# Patient Record
Sex: MALE | Race: ASIAN | NOT HISPANIC OR LATINO | URBAN - METROPOLITAN AREA
[De-identification: names, ages, dates, MRNs, and addresses within clinical notes are randomized per-mention and may not be internally consistent; named-entity substitution may affect disease eponyms.]

---

## 2018-03-19 ENCOUNTER — OUTPATIENT (OUTPATIENT)
Dept: OUTPATIENT SERVICES | Facility: HOSPITAL | Age: 78
LOS: 1 days | End: 2018-03-19
Payer: MEDICARE

## 2018-03-19 ENCOUNTER — APPOINTMENT (OUTPATIENT)
Dept: NUCLEAR MEDICINE | Facility: IMAGING CENTER | Age: 78
End: 2018-03-19
Payer: MEDICARE

## 2018-03-19 DIAGNOSIS — Z00.8 ENCOUNTER FOR OTHER GENERAL EXAMINATION: ICD-10-CM

## 2018-03-19 PROCEDURE — 78999 UNLISTED MISC PX DX NUC MED: CPT

## 2018-03-19 PROCEDURE — 78320: CPT | Mod: 26

## 2018-03-19 PROCEDURE — 78306 BONE IMAGING WHOLE BODY: CPT | Mod: 26

## 2018-03-19 PROCEDURE — 78306 BONE IMAGING WHOLE BODY: CPT

## 2018-03-19 PROCEDURE — A9561: CPT

## 2018-06-15 ENCOUNTER — RECORD ABSTRACTING (OUTPATIENT)
Age: 78
End: 2018-06-15

## 2018-06-18 RX ORDER — BLOOD SUGAR DIAGNOSTIC
STRIP MISCELLANEOUS DAILY
Qty: 1 | Refills: 0 | Status: ACTIVE | COMMUNITY
Start: 2018-06-15 | End: 1900-01-01

## 2018-08-06 PROBLEM — Z63.4 WIDOWED: Status: ACTIVE | Noted: 2018-08-02

## 2018-08-06 PROBLEM — K59.00 CONSTIPATION: Status: ACTIVE | Noted: 2018-08-02

## 2018-08-06 PROBLEM — Z80.3 FAMILY HISTORY OF MALIGNANT NEOPLASM OF BREAST: Status: ACTIVE | Noted: 2018-08-02

## 2018-08-06 PROBLEM — C79.51 BONE METASTASES: Status: ACTIVE | Noted: 2018-08-06

## 2018-08-06 PROBLEM — Z80.0 FAMILY HISTORY OF MALIGNANT NEOPLASM OF COLON: Status: ACTIVE | Noted: 2018-08-02

## 2018-08-06 RX ORDER — ADHESIVE TAPE 3"X 2.3 YD
50 MCG TAPE, NON-MEDICATED TOPICAL
Refills: 0 | Status: ACTIVE | COMMUNITY

## 2018-08-06 RX ORDER — LEUPROLIDE ACETATE 7.5 MG
7.5 KIT INTRAMUSCULAR
Refills: 0 | Status: ACTIVE | COMMUNITY

## 2018-08-07 ENCOUNTER — TRANSCRIPTION ENCOUNTER (OUTPATIENT)
Age: 78
End: 2018-08-07

## 2018-08-07 ENCOUNTER — APPOINTMENT (OUTPATIENT)
Dept: INTERNAL MEDICINE | Facility: CLINIC | Age: 78
End: 2018-08-07
Payer: MEDICARE

## 2018-08-07 VITALS
DIASTOLIC BLOOD PRESSURE: 80 MMHG | HEIGHT: 68 IN | TEMPERATURE: 97.1 F | SYSTOLIC BLOOD PRESSURE: 130 MMHG | BODY MASS INDEX: 22.13 KG/M2 | HEART RATE: 62 BPM | RESPIRATION RATE: 15 BRPM | WEIGHT: 146 LBS

## 2018-08-07 DIAGNOSIS — C79.51 SECONDARY MALIGNANT NEOPLASM OF BONE: ICD-10-CM

## 2018-08-07 DIAGNOSIS — E11.9 TYPE 2 DIABETES MELLITUS W/OUT COMPLICATIONS: ICD-10-CM

## 2018-08-07 DIAGNOSIS — Z80.0 FAMILY HISTORY OF MALIGNANT NEOPLASM OF DIGESTIVE ORGANS: ICD-10-CM

## 2018-08-07 DIAGNOSIS — K59.00 CONSTIPATION, UNSPECIFIED: ICD-10-CM

## 2018-08-07 DIAGNOSIS — Z63.4 DISAPPEARANCE AND DEATH OF FAMILY MEMBER: ICD-10-CM

## 2018-08-07 DIAGNOSIS — Z13.89 ENCOUNTER FOR SCREENING FOR OTHER DISORDER: ICD-10-CM

## 2018-08-07 DIAGNOSIS — Z80.3 FAMILY HISTORY OF MALIGNANT NEOPLASM OF BREAST: ICD-10-CM

## 2018-08-07 PROCEDURE — 99214 OFFICE O/P EST MOD 30 MIN: CPT | Mod: 25

## 2018-08-07 PROCEDURE — G0444 DEPRESSION SCREEN ANNUAL: CPT | Mod: 59

## 2018-08-07 PROCEDURE — 36415 COLL VENOUS BLD VENIPUNCTURE: CPT

## 2018-08-07 RX ORDER — LANCETS
EACH MISCELLANEOUS
Qty: 180 | Refills: 2 | Status: ACTIVE | COMMUNITY
Start: 2018-08-07 | End: 1900-01-01

## 2018-08-07 RX ORDER — LANCETS 28 GAUGE
EACH MISCELLANEOUS
Qty: 1 | Refills: 0 | Status: DISCONTINUED | COMMUNITY
Start: 2018-06-15 | End: 2018-08-07

## 2018-08-07 RX ORDER — ENZALUTAMIDE 40 MG/1
40 CAPSULE ORAL
Refills: 0 | Status: DISCONTINUED | COMMUNITY
End: 2018-08-07

## 2018-08-07 RX ORDER — BLOOD SUGAR DIAGNOSTIC
STRIP MISCELLANEOUS TWICE DAILY
Qty: 180 | Refills: 2 | Status: ACTIVE | COMMUNITY
Start: 2018-08-07 | End: 1900-01-01

## 2018-08-07 SDOH — SOCIAL STABILITY - SOCIAL INSECURITY: DISSAPEARANCE AND DEATH OF FAMILY MEMBER: Z63.4

## 2018-08-07 NOTE — HISTORY OF PRESENT ILLNESS
[FreeTextEntry1] : f/u DM, lipids [de-identified] : he sees his oncologist monthly. His PSA was rising. his medication was changed from Xtandi to Zytiga. he was also started on prednisone. He has been eating more. May have gained weight. No polyuria or polydipsia.

## 2018-08-07 NOTE — PHYSICAL EXAM
[No Acute Distress] : no acute distress [Well Nourished] : well nourished [Well Developed] : well developed [Normal Sclera/Conjunctiva] : normal sclera/conjunctiva [Normal Outer Ear/Nose] : the outer ears and nose were normal in appearance [No Respiratory Distress] : no respiratory distress  [No Accessory Muscle Use] : no accessory muscle use [Normal Rate] : normal rate  [Regular Rhythm] : with a regular rhythm [Normal S1, S2] : normal S1 and S2 [No Edema] : there was no peripheral edema [Normal Gait] : normal gait [No Focal Deficits] : no focal deficits [Normal Affect] : the affect was normal [Normal Insight/Judgement] : insight and judgment were intact

## 2018-08-07 NOTE — PLAN
[FreeTextEntry1] : Depression screening negative \par Check labs today\par Followup with the oncologist monthly\par Followup here 4 months

## 2018-08-08 ENCOUNTER — TRANSCRIPTION ENCOUNTER (OUTPATIENT)
Age: 78
End: 2018-08-08

## 2018-08-10 ENCOUNTER — TRANSCRIPTION ENCOUNTER (OUTPATIENT)
Age: 78
End: 2018-08-10

## 2018-08-16 ENCOUNTER — TRANSCRIPTION ENCOUNTER (OUTPATIENT)
Age: 78
End: 2018-08-16

## 2018-08-16 LAB
ALBUMIN SERPL ELPH-MCNC: 4.7 G/DL
ALP BLD-CCNC: 40 U/L
ALT SERPL-CCNC: 19 U/L
ANION GAP SERPL CALC-SCNC: 18 MMOL/L
AST SERPL-CCNC: 23 U/L
BILIRUB SERPL-MCNC: 0.7 MG/DL
BUN SERPL-MCNC: 19 MG/DL
CALCIUM SERPL-MCNC: 9.5 MG/DL
CHLORIDE SERPL-SCNC: 101 MMOL/L
CHOLEST SERPL-MCNC: 238 MG/DL
CHOLEST/HDLC SERPL: 4.3 RATIO
CO2 SERPL-SCNC: 21 MMOL/L
CREAT SERPL-MCNC: 0.83 MG/DL
GLUCOSE SERPL-MCNC: 117 MG/DL
HBA1C MFR BLD HPLC: 7.3 %
HDLC SERPL-MCNC: 55 MG/DL
LDLC SERPL CALC-MCNC: 129 MG/DL
POTASSIUM SERPL-SCNC: 4.4 MMOL/L
PROT SERPL-MCNC: 7.1 G/DL
SODIUM SERPL-SCNC: 140 MMOL/L
TRIGL SERPL-MCNC: 269 MG/DL

## 2019-04-04 ENCOUNTER — OUTPATIENT (OUTPATIENT)
Dept: OUTPATIENT SERVICES | Facility: HOSPITAL | Age: 79
LOS: 1 days | End: 2019-04-04
Payer: MEDICARE

## 2019-04-04 ENCOUNTER — APPOINTMENT (OUTPATIENT)
Dept: NUCLEAR MEDICINE | Facility: IMAGING CENTER | Age: 79
End: 2019-04-04
Payer: MEDICARE

## 2019-04-04 ENCOUNTER — TRANSCRIPTION ENCOUNTER (OUTPATIENT)
Age: 79
End: 2019-04-04

## 2019-04-04 DIAGNOSIS — Z00.8 ENCOUNTER FOR OTHER GENERAL EXAMINATION: ICD-10-CM

## 2019-04-04 PROCEDURE — 78320: CPT | Mod: 26

## 2019-04-04 PROCEDURE — 78306 BONE IMAGING WHOLE BODY: CPT | Mod: 26

## 2019-04-04 PROCEDURE — A9561: CPT

## 2019-04-04 PROCEDURE — 78999 UNLISTED MISC PX DX NUC MED: CPT

## 2019-04-04 PROCEDURE — 78306 BONE IMAGING WHOLE BODY: CPT

## 2019-06-12 ENCOUNTER — NON-APPOINTMENT (OUTPATIENT)
Age: 79
End: 2019-06-12

## 2019-06-12 ENCOUNTER — APPOINTMENT (OUTPATIENT)
Dept: INTERNAL MEDICINE | Facility: CLINIC | Age: 79
End: 2019-06-12
Payer: MEDICARE

## 2019-06-12 VITALS
HEIGHT: 68 IN | TEMPERATURE: 94.5 F | DIASTOLIC BLOOD PRESSURE: 80 MMHG | SYSTOLIC BLOOD PRESSURE: 132 MMHG | WEIGHT: 146 LBS | BODY MASS INDEX: 22.13 KG/M2

## 2019-06-12 DIAGNOSIS — E55.9 VITAMIN D DEFICIENCY, UNSPECIFIED: ICD-10-CM

## 2019-06-12 PROCEDURE — G0439: CPT

## 2019-06-12 PROCEDURE — 93000 ELECTROCARDIOGRAM COMPLETE: CPT

## 2019-06-12 PROCEDURE — 36415 COLL VENOUS BLD VENIPUNCTURE: CPT

## 2019-06-12 NOTE — HISTORY OF PRESENT ILLNESS
[Other: _____] : [unfilled] [FreeTextEntry1] : annual physical [de-identified] : He sees his oncologist every 3 months. He is still on prednisone and Zytiga\par He has a good appetite\par he notices he bruises easily\par

## 2019-06-12 NOTE — HEALTH RISK ASSESSMENT
[] : No [0] : 2) Feeling down, depressed, or hopeless: Not at all (0) [DEP3Bgwrm] : 0 [Change in mental status noted] : No change in mental status noted [None] : None [With Family] : lives with family [Retired] : retired [] :  [Reports changes in hearing] : Reports no changes in hearing [Reports changes in vision] : Reports no changes in vision [Reports changes in dental health] : Reports no changes in dental health

## 2019-06-12 NOTE — PLAN
[FreeTextEntry1] : Depression screening negative\par Check labs today\par Likely HbA1c will be higher due to the steroids If >8 will discuss medication\par f/u with oncology\par f/u to be determined by above

## 2019-06-12 NOTE — PHYSICAL EXAM
[No Acute Distress] : no acute distress [Well Developed] : well developed [Well Nourished] : well nourished [Well-Appearing] : well-appearing [Normal Sclera/Conjunctiva] : normal sclera/conjunctiva [PERRL] : pupils equal round and reactive to light [EOMI] : extraocular movements intact [Normal Outer Ear/Nose] : the outer ears and nose were normal in appearance [Normal Oropharynx] : the oropharynx was normal [No JVD] : no jugular venous distention [Normal TMs] : both tympanic membranes were normal [Supple] : supple [No Lymphadenopathy] : no lymphadenopathy [Thyroid Normal, No Nodules] : the thyroid was normal and there were no nodules present [No Respiratory Distress] : no respiratory distress  [Clear to Auscultation] : lungs were clear to auscultation bilaterally [No Accessory Muscle Use] : no accessory muscle use [Normal Rate] : normal rate  [Regular Rhythm] : with a regular rhythm [Normal S1, S2] : normal S1 and S2 [No Murmur] : no murmur heard [No Carotid Bruits] : no carotid bruits [No Varicosities] : no varicosities [Pedal Pulses Present] : the pedal pulses are present [No Edema] : there was no peripheral edema [No Extremity Clubbing/Cyanosis] : no extremity clubbing/cyanosis [Soft] : abdomen soft [Non Tender] : non-tender [Non-distended] : non-distended [No Masses] : no abdominal mass palpated [No HSM] : no HSM [Normal Bowel Sounds] : normal bowel sounds [Normal Axillary Nodes] : no axillary lymphadenopathy [Normal Supraclavicular Nodes] : no supraclavicular lymphadenopathy [Normal Posterior Cervical Nodes] : no posterior cervical lymphadenopathy [Normal Anterior Cervical Nodes] : no anterior cervical lymphadenopathy [No CVA Tenderness] : no CVA  tenderness [No Spinal Tenderness] : no spinal tenderness [Grossly Normal Strength/Tone] : grossly normal strength/tone [No Joint Swelling] : no joint swelling [Normal Gait] : normal gait [No Rash] : no rash [Coordination Grossly Intact] : coordination grossly intact [No Focal Deficits] : no focal deficits [Deep Tendon Reflexes (DTR)] : deep tendon reflexes were 2+ and symmetric [Normal Affect] : the affect was normal [Normal Insight/Judgement] : insight and judgment were intact

## 2019-06-13 ENCOUNTER — TRANSCRIPTION ENCOUNTER (OUTPATIENT)
Age: 79
End: 2019-06-13

## 2019-06-13 LAB
25(OH)D3 SERPL-MCNC: 26.5 NG/ML
ALBUMIN SERPL ELPH-MCNC: 4.2 G/DL
ALP BLD-CCNC: 92 U/L
ALT SERPL-CCNC: 38 U/L
ANION GAP SERPL CALC-SCNC: 13 MMOL/L
APPEARANCE: CLEAR
AST SERPL-CCNC: 32 U/L
BACTERIA: NEGATIVE
BASOPHILS # BLD AUTO: 0.03 K/UL
BASOPHILS NFR BLD AUTO: 0.6 %
BILIRUB SERPL-MCNC: 0.8 MG/DL
BILIRUBIN URINE: NEGATIVE
BLOOD URINE: NEGATIVE
BUN SERPL-MCNC: 13 MG/DL
CALCIUM SERPL-MCNC: 9.3 MG/DL
CHLORIDE SERPL-SCNC: 99 MMOL/L
CHOLEST SERPL-MCNC: 240 MG/DL
CHOLEST/HDLC SERPL: 5.6 RATIO
CO2 SERPL-SCNC: 26 MMOL/L
COLOR: YELLOW
CREAT SERPL-MCNC: 0.93 MG/DL
EOSINOPHIL # BLD AUTO: 0.02 K/UL
EOSINOPHIL NFR BLD AUTO: 0.4 %
ESTIMATED AVERAGE GLUCOSE: 174 MG/DL
GLUCOSE QUALITATIVE U: NEGATIVE
GLUCOSE SERPL-MCNC: 127 MG/DL
HBA1C MFR BLD HPLC: 7.7 %
HCT VFR BLD CALC: 41.8 %
HDLC SERPL-MCNC: 43 MG/DL
HGB BLD-MCNC: 13.4 G/DL
HYALINE CASTS: 0 /LPF
IMM GRANULOCYTES NFR BLD AUTO: 3 %
KETONES URINE: NORMAL
LDLC SERPL CALC-MCNC: 117 MG/DL
LEUKOCYTE ESTERASE URINE: NEGATIVE
LYMPHOCYTES # BLD AUTO: 1.62 K/UL
LYMPHOCYTES NFR BLD AUTO: 34.6 %
MAN DIFF?: NORMAL
MCHC RBC-ENTMCNC: 32.1 GM/DL
MCHC RBC-ENTMCNC: 33.2 PG
MCV RBC AUTO: 103.5 FL
MICROSCOPIC-UA: NORMAL
MONOCYTES # BLD AUTO: 0.52 K/UL
MONOCYTES NFR BLD AUTO: 11.1 %
NEUTROPHILS # BLD AUTO: 2.35 K/UL
NEUTROPHILS NFR BLD AUTO: 50.3 %
NITRITE URINE: NEGATIVE
PH URINE: 7
PLATELET # BLD AUTO: 212 K/UL
POTASSIUM SERPL-SCNC: 4.4 MMOL/L
PROT SERPL-MCNC: 6.7 G/DL
PROTEIN URINE: NORMAL
RBC # BLD: 4.04 M/UL
RBC # FLD: 13.7 %
RED BLOOD CELLS URINE: 5 /HPF
SODIUM SERPL-SCNC: 138 MMOL/L
SPECIFIC GRAVITY URINE: 1.01
SQUAMOUS EPITHELIAL CELLS: 0 /HPF
TRIGL SERPL-MCNC: 398 MG/DL
TSH SERPL-ACNC: 2.32 UIU/ML
UROBILINOGEN URINE: NORMAL
WBC # FLD AUTO: 4.68 K/UL
WHITE BLOOD CELLS URINE: 0 /HPF

## 2019-10-01 ENCOUNTER — APPOINTMENT (OUTPATIENT)
Dept: INTERNAL MEDICINE | Facility: CLINIC | Age: 79
End: 2019-10-01
Payer: MEDICARE

## 2019-10-01 VITALS
DIASTOLIC BLOOD PRESSURE: 70 MMHG | TEMPERATURE: 97.3 F | HEIGHT: 68 IN | WEIGHT: 137 LBS | SYSTOLIC BLOOD PRESSURE: 108 MMHG | BODY MASS INDEX: 20.76 KG/M2

## 2019-10-01 PROCEDURE — 99214 OFFICE O/P EST MOD 30 MIN: CPT | Mod: 25

## 2019-10-01 PROCEDURE — 36415 COLL VENOUS BLD VENIPUNCTURE: CPT

## 2019-10-01 NOTE — HISTORY OF PRESENT ILLNESS
[Other: _____] : [unfilled] [FreeTextEntry1] : f/u DM, lipids [de-identified] : 79-year-old male with a history of metastatic prostate cancer, diabetes who is here for followup. Last visit his hemoglobin A1c ana rosa to 7.7. He is on daily steroids. He was started on metformin. He denies abdominal pain, nausea, vomiting.\par According to his daughter his PSA is rising.

## 2019-10-01 NOTE — PLAN
[FreeTextEntry1] : Will recheck his hemoglobin A1c today. Adjust metformin as dictated\par Recheck lipids.\par He declines all vaccinations\par f/u with oncology/urology\par Followup 3 months

## 2019-10-01 NOTE — PHYSICAL EXAM
[No Acute Distress] : no acute distress [Well Nourished] : well nourished [Normal Sclera/Conjunctiva] : normal sclera/conjunctiva [Normal Outer Ear/Nose] : the outer ears and nose were normal in appearance [No Lymphadenopathy] : no lymphadenopathy [Supple] : supple [No Respiratory Distress] : no respiratory distress  [No Accessory Muscle Use] : no accessory muscle use [Clear to Auscultation] : lungs were clear to auscultation bilaterally [Normal Rate] : normal rate  [Regular Rhythm] : with a regular rhythm [Normal S1, S2] : normal S1 and S2 [Coordination Grossly Intact] : coordination grossly intact [Normal Gait] : normal gait [Normal Affect] : the affect was normal [Normal Insight/Judgement] : insight and judgment were intact

## 2019-10-07 ENCOUNTER — TRANSCRIPTION ENCOUNTER (OUTPATIENT)
Age: 79
End: 2019-10-07

## 2019-10-07 LAB
ALBUMIN SERPL ELPH-MCNC: 4.6 G/DL
ALP BLD-CCNC: 104 U/L
ALT SERPL-CCNC: 10 U/L
ANION GAP SERPL CALC-SCNC: 14 MMOL/L
AST SERPL-CCNC: 19 U/L
BILIRUB SERPL-MCNC: 0.8 MG/DL
BUN SERPL-MCNC: 17 MG/DL
CALCIUM SERPL-MCNC: 9.3 MG/DL
CHLORIDE SERPL-SCNC: 102 MMOL/L
CHOLEST SERPL-MCNC: 230 MG/DL
CHOLEST/HDLC SERPL: 5.2 RATIO
CO2 SERPL-SCNC: 27 MMOL/L
CREAT SERPL-MCNC: 0.77 MG/DL
ESTIMATED AVERAGE GLUCOSE: 177 MG/DL
GLUCOSE SERPL-MCNC: 117 MG/DL
HBA1C MFR BLD HPLC: 7.8 %
HDLC SERPL-MCNC: 44 MG/DL
LDLC SERPL CALC-MCNC: NORMAL MG/DL
POTASSIUM SERPL-SCNC: 4.4 MMOL/L
PROT SERPL-MCNC: 6.6 G/DL
SODIUM SERPL-SCNC: 143 MMOL/L
TRIGL SERPL-MCNC: 424 MG/DL

## 2019-10-09 ENCOUNTER — RX RENEWAL (OUTPATIENT)
Age: 79
End: 2019-10-09

## 2019-10-09 ENCOUNTER — TRANSCRIPTION ENCOUNTER (OUTPATIENT)
Age: 79
End: 2019-10-09

## 2019-12-18 ENCOUNTER — FORM ENCOUNTER (OUTPATIENT)
Age: 79
End: 2019-12-18

## 2019-12-18 ENCOUNTER — APPOINTMENT (OUTPATIENT)
Dept: NUCLEAR MEDICINE | Facility: IMAGING CENTER | Age: 79
End: 2019-12-18
Payer: MEDICARE

## 2019-12-18 ENCOUNTER — OUTPATIENT (OUTPATIENT)
Dept: OUTPATIENT SERVICES | Facility: HOSPITAL | Age: 79
LOS: 1 days | End: 2019-12-18
Payer: MEDICARE

## 2019-12-18 DIAGNOSIS — Z00.8 ENCOUNTER FOR OTHER GENERAL EXAMINATION: ICD-10-CM

## 2019-12-18 PROCEDURE — 78999A: CUSTOM | Mod: 26

## 2019-12-18 PROCEDURE — 78306 BONE IMAGING WHOLE BODY: CPT

## 2019-12-18 PROCEDURE — 78306 BONE IMAGING WHOLE BODY: CPT | Mod: 26

## 2019-12-18 PROCEDURE — 78999 UNLISTED MISC PX DX NUC MED: CPT

## 2019-12-18 PROCEDURE — A9561: CPT

## 2019-12-18 PROCEDURE — 78803 RP LOCLZJ TUM SPECT 1 AREA: CPT

## 2019-12-18 PROCEDURE — 78320: CPT | Mod: 26

## 2019-12-19 ENCOUNTER — OUTPATIENT (OUTPATIENT)
Dept: OUTPATIENT SERVICES | Facility: HOSPITAL | Age: 79
LOS: 1 days | End: 2019-12-19
Payer: MEDICARE

## 2019-12-19 ENCOUNTER — APPOINTMENT (OUTPATIENT)
Dept: ORTHOPEDIC SURGERY | Facility: CLINIC | Age: 79
End: 2019-12-19
Payer: MEDICARE

## 2019-12-19 VITALS
DIASTOLIC BLOOD PRESSURE: 78 MMHG | BODY MASS INDEX: 20.62 KG/M2 | WEIGHT: 136.04 LBS | SYSTOLIC BLOOD PRESSURE: 132 MMHG | HEART RATE: 86 BPM | HEIGHT: 68 IN | OXYGEN SATURATION: 96 %

## 2019-12-19 DIAGNOSIS — Z00.8 ENCOUNTER FOR OTHER GENERAL EXAMINATION: ICD-10-CM

## 2019-12-19 PROCEDURE — 72170 X-RAY EXAM OF PELVIS: CPT

## 2019-12-19 PROCEDURE — 73501 X-RAY EXAM HIP UNI 1 VIEW: CPT | Mod: 26,RT,76

## 2019-12-19 PROCEDURE — 73552 X-RAY EXAM OF FEMUR 2/>: CPT | Mod: LT

## 2019-12-19 PROCEDURE — 73551 X-RAY EXAM OF FEMUR 1: CPT | Mod: 26,50

## 2019-12-19 PROCEDURE — 99204 OFFICE O/P NEW MOD 45 MIN: CPT

## 2019-12-19 NOTE — REASON FOR VISIT
[FreeTextEntry1] : Presented for evaluation of multifocal skeletal metastatic disease from prostate carcinoma

## 2019-12-19 NOTE — HISTORY OF PRESENT ILLNESS
[FreeTextEntry1] : This is the first visit of an 79 years old male with a medical history of prostate carcinoma several years ago and patient was diagnosed with prostate carcinoma at that time already patient had mostly focal skeletal metastatic disease and there was no treatment patient was admitted with a palliative medication. At this date patient having mild tenderness over the musculoskeletal system. Recent x-ray demonstrated multifocal blastic skeletal lesion for metastatic prostate

## 2019-12-19 NOTE — PHYSICAL EXAM
[FreeTextEntry1] : Physical exam reveals a healthy-looking patient in no apparent distress patient appeared to be fully alert oriented and in no significant complaints ambulating without any difficulty and in no significant pain on ambulation. And there is no palpable mass no gross neurovascular deficit patient having good range of motion about both hips.\par X-rays of the pelvis including right and left femur demonstrate a multifocal osteoblastic lesion for metastatic prostate carcinoma this likely impending fracture and this date patient was recommended to continue with the same level of activity or that the risk of possible future fracture could not be ruled out

## 2020-02-04 ENCOUNTER — APPOINTMENT (OUTPATIENT)
Dept: INTERNAL MEDICINE | Facility: CLINIC | Age: 80
End: 2020-02-04
Payer: MEDICARE

## 2020-02-04 VITALS
SYSTOLIC BLOOD PRESSURE: 120 MMHG | DIASTOLIC BLOOD PRESSURE: 80 MMHG | TEMPERATURE: 97.1 F | OXYGEN SATURATION: 97 % | HEIGHT: 68 IN | HEART RATE: 72 BPM | BODY MASS INDEX: 20.46 KG/M2 | WEIGHT: 135 LBS

## 2020-02-04 DIAGNOSIS — C61 MALIGNANT NEOPLASM OF PROSTATE: ICD-10-CM

## 2020-02-04 DIAGNOSIS — R63.4 ABNORMAL WEIGHT LOSS: ICD-10-CM

## 2020-02-04 PROCEDURE — 99214 OFFICE O/P EST MOD 30 MIN: CPT | Mod: 25

## 2020-02-04 PROCEDURE — 36415 COLL VENOUS BLD VENIPUNCTURE: CPT

## 2020-02-04 NOTE — HISTORY OF PRESENT ILLNESS
[Other: _____] : [unfilled] [FreeTextEntry1] : f/u DM and lipids [de-identified] : 80-year-old male history of metastatic prostate cancer, diabetes and hypertriglyceridemia who is here for followup. He states that he has a good appetite eats everything. He denies polyuria or polydipsia. He has felt on steroids. According to his daughter his PSA is rising. It was last around 50. He is scheduled to go back to the oncologist in March. The daughter states that they are thinking about starting chemotherapy. He denies any pain. He has a slight cough. No fevers or chills. Cough is nonproductive.

## 2020-02-04 NOTE — REVIEW OF SYSTEMS
[Recent Change In Weight] : ~T recent weight change [Cough] : cough [Negative] : Heme/Lymph [Fever] : no fever [Chills] : no chills [Shortness Of Breath] : no shortness of breath

## 2020-02-04 NOTE — PLAN
[FreeTextEntry1] : Depression screening negative\par Blood pressure is stable\par He has lost weight despite having a good appetite. His daughter plans to readdress the treatment for his prostate cancer with the oncologist. They requested a PSA be checked today.\par Check hemoglobin A1c and lipids. He is fasting. Blood drawn in office\par He is on metformin.  Will make adjustments to his medications his labs dictate.\par Declined all vaccinations

## 2020-02-04 NOTE — PHYSICAL EXAM
[No Acute Distress] : no acute distress [Well Nourished] : well nourished [Normal Sclera/Conjunctiva] : normal sclera/conjunctiva [No Respiratory Distress] : no respiratory distress  [Normal Outer Ear/Nose] : the outer ears and nose were normal in appearance [No Accessory Muscle Use] : no accessory muscle use [Clear to Auscultation] : lungs were clear to auscultation bilaterally [Normal Rate] : normal rate  [Regular Rhythm] : with a regular rhythm [Normal S1, S2] : normal S1 and S2 [Coordination Grossly Intact] : coordination grossly intact [No Focal Deficits] : no focal deficits [Normal Affect] : the affect was normal [Normal Insight/Judgement] : insight and judgment were intact

## 2020-02-06 ENCOUNTER — TRANSCRIPTION ENCOUNTER (OUTPATIENT)
Age: 80
End: 2020-02-06

## 2020-02-07 ENCOUNTER — TRANSCRIPTION ENCOUNTER (OUTPATIENT)
Age: 80
End: 2020-02-07

## 2020-02-12 ENCOUNTER — TRANSCRIPTION ENCOUNTER (OUTPATIENT)
Age: 80
End: 2020-02-12

## 2020-02-12 LAB
ALBUMIN SERPL ELPH-MCNC: 4.7 G/DL
ALP BLD-CCNC: 143 U/L
ALT SERPL-CCNC: 13 U/L
ANION GAP SERPL CALC-SCNC: 20 MMOL/L
AST SERPL-CCNC: 17 U/L
BILIRUB SERPL-MCNC: 0.7 MG/DL
BUN SERPL-MCNC: 22 MG/DL
CALCIUM SERPL-MCNC: 9.4 MG/DL
CHLORIDE SERPL-SCNC: 100 MMOL/L
CHOLEST SERPL-MCNC: 277 MG/DL
CHOLEST/HDLC SERPL: 5.4 RATIO
CO2 SERPL-SCNC: 22 MMOL/L
CREAT SERPL-MCNC: 0.81 MG/DL
GLUCOSE SERPL-MCNC: 111 MG/DL
HDLC SERPL-MCNC: 51 MG/DL
LDLC SERPL CALC-MCNC: NORMAL MG/DL
POTASSIUM SERPL-SCNC: 3.9 MMOL/L
PROT SERPL-MCNC: 6.9 G/DL
PSA SERPL-MCNC: 62.2 NG/ML
SODIUM SERPL-SCNC: 142 MMOL/L
TRIGL SERPL-MCNC: 440 MG/DL

## 2020-02-20 ENCOUNTER — INPATIENT (INPATIENT)
Facility: HOSPITAL | Age: 80
LOS: 3 days | Discharge: HOME | DRG: 309 | End: 2020-02-24
Attending: HOSPITALIST | Admitting: STUDENT IN AN ORGANIZED HEALTH CARE EDUCATION/TRAINING PROGRAM
Payer: MEDICARE

## 2020-02-20 VITALS
TEMPERATURE: 98 F | HEIGHT: 68 IN | RESPIRATION RATE: 18 BRPM | OXYGEN SATURATION: 97 % | DIASTOLIC BLOOD PRESSURE: 77 MMHG | WEIGHT: 139.99 LBS | SYSTOLIC BLOOD PRESSURE: 111 MMHG | HEART RATE: 198 BPM

## 2020-02-20 DIAGNOSIS — Z29.9 ENCOUNTER FOR PROPHYLACTIC MEASURES, UNSPECIFIED: ICD-10-CM

## 2020-02-20 DIAGNOSIS — I47.1 SUPRAVENTRICULAR TACHYCARDIA: ICD-10-CM

## 2020-02-20 DIAGNOSIS — C61 MALIGNANT NEOPLASM OF PROSTATE: ICD-10-CM

## 2020-02-20 DIAGNOSIS — E11.9 TYPE 2 DIABETES MELLITUS WITHOUT COMPLICATIONS: ICD-10-CM

## 2020-02-20 DIAGNOSIS — Z02.9 ENCOUNTER FOR ADMINISTRATIVE EXAMINATIONS, UNSPECIFIED: ICD-10-CM

## 2020-02-20 LAB
ALBUMIN SERPL ELPH-MCNC: 4 G/DL — SIGNIFICANT CHANGE UP (ref 3.3–5)
ALP SERPL-CCNC: 158 U/L — HIGH (ref 40–120)
ALT FLD-CCNC: 11 U/L — SIGNIFICANT CHANGE UP (ref 10–45)
ANION GAP SERPL CALC-SCNC: 15 MMOL/L — SIGNIFICANT CHANGE UP (ref 5–17)
AST SERPL-CCNC: 27 U/L — SIGNIFICANT CHANGE UP (ref 10–40)
BASOPHILS # BLD AUTO: 0.02 K/UL — SIGNIFICANT CHANGE UP (ref 0–0.2)
BASOPHILS NFR BLD AUTO: 0.3 % — SIGNIFICANT CHANGE UP (ref 0–2)
BILIRUB SERPL-MCNC: 0.6 MG/DL — SIGNIFICANT CHANGE UP (ref 0.2–1.2)
BUN SERPL-MCNC: 18 MG/DL — SIGNIFICANT CHANGE UP (ref 7–23)
CALCIUM SERPL-MCNC: 9.4 MG/DL — SIGNIFICANT CHANGE UP (ref 8.4–10.5)
CHLORIDE SERPL-SCNC: 95 MMOL/L — LOW (ref 96–108)
CO2 SERPL-SCNC: 23 MMOL/L — SIGNIFICANT CHANGE UP (ref 22–31)
CREAT SERPL-MCNC: 0.79 MG/DL — SIGNIFICANT CHANGE UP (ref 0.5–1.3)
EOSINOPHIL # BLD AUTO: 0 K/UL — SIGNIFICANT CHANGE UP (ref 0–0.5)
EOSINOPHIL NFR BLD AUTO: 0 % — SIGNIFICANT CHANGE UP (ref 0–6)
GLUCOSE SERPL-MCNC: 260 MG/DL — HIGH (ref 70–99)
HCT VFR BLD CALC: 40.7 % — SIGNIFICANT CHANGE UP (ref 39–50)
HGB BLD-MCNC: 12.9 G/DL — LOW (ref 13–17)
IMM GRANULOCYTES NFR BLD AUTO: 0.9 % — SIGNIFICANT CHANGE UP (ref 0–1.5)
LYMPHOCYTES # BLD AUTO: 1.17 K/UL — SIGNIFICANT CHANGE UP (ref 1–3.3)
LYMPHOCYTES # BLD AUTO: 15.7 % — SIGNIFICANT CHANGE UP (ref 13–44)
MAGNESIUM SERPL-MCNC: 2.2 MG/DL — SIGNIFICANT CHANGE UP (ref 1.6–2.6)
MCHC RBC-ENTMCNC: 31.7 GM/DL — LOW (ref 32–36)
MCHC RBC-ENTMCNC: 32.3 PG — SIGNIFICANT CHANGE UP (ref 27–34)
MCV RBC AUTO: 101.8 FL — HIGH (ref 80–100)
MONOCYTES # BLD AUTO: 0.69 K/UL — SIGNIFICANT CHANGE UP (ref 0–0.9)
MONOCYTES NFR BLD AUTO: 9.3 % — SIGNIFICANT CHANGE UP (ref 2–14)
NEUTROPHILS # BLD AUTO: 5.5 K/UL — SIGNIFICANT CHANGE UP (ref 1.8–7.4)
NEUTROPHILS NFR BLD AUTO: 73.8 % — SIGNIFICANT CHANGE UP (ref 43–77)
NRBC # BLD: 0 /100 WBCS — SIGNIFICANT CHANGE UP (ref 0–0)
PHOSPHATE SERPL-MCNC: 2.8 MG/DL — SIGNIFICANT CHANGE UP (ref 2.5–4.5)
PLATELET # BLD AUTO: 192 K/UL — SIGNIFICANT CHANGE UP (ref 150–400)
POTASSIUM SERPL-MCNC: 4.4 MMOL/L — SIGNIFICANT CHANGE UP (ref 3.5–5.3)
POTASSIUM SERPL-SCNC: 4.4 MMOL/L — SIGNIFICANT CHANGE UP (ref 3.5–5.3)
PROT SERPL-MCNC: 7.1 G/DL — SIGNIFICANT CHANGE UP (ref 6–8.3)
RBC # BLD: 4 M/UL — LOW (ref 4.2–5.8)
RBC # FLD: 13.9 % — SIGNIFICANT CHANGE UP (ref 10.3–14.5)
SODIUM SERPL-SCNC: 133 MMOL/L — LOW (ref 135–145)
TROPONIN T, HIGH SENSITIVITY RESULT: 26 NG/L — SIGNIFICANT CHANGE UP (ref 0–51)
TROPONIN T, HIGH SENSITIVITY RESULT: 29 NG/L — SIGNIFICANT CHANGE UP (ref 0–51)
TSH SERPL-MCNC: 1.36 UIU/ML — SIGNIFICANT CHANGE UP (ref 0.27–4.2)
WBC # BLD: 7.45 K/UL — SIGNIFICANT CHANGE UP (ref 3.8–10.5)
WBC # FLD AUTO: 7.45 K/UL — SIGNIFICANT CHANGE UP (ref 3.8–10.5)

## 2020-02-20 PROCEDURE — 71045 X-RAY EXAM CHEST 1 VIEW: CPT | Mod: 26

## 2020-02-20 PROCEDURE — 99223 1ST HOSP IP/OBS HIGH 75: CPT

## 2020-02-20 PROCEDURE — 99291 CRITICAL CARE FIRST HOUR: CPT

## 2020-02-20 PROCEDURE — 93010 ELECTROCARDIOGRAM REPORT: CPT

## 2020-02-20 RX ORDER — INSULIN LISPRO 100/ML
VIAL (ML) SUBCUTANEOUS
Refills: 0 | Status: DISCONTINUED | OUTPATIENT
Start: 2020-02-20 | End: 2020-02-24

## 2020-02-20 RX ORDER — ENOXAPARIN SODIUM 100 MG/ML
40 INJECTION SUBCUTANEOUS DAILY
Refills: 0 | Status: DISCONTINUED | OUTPATIENT
Start: 2020-02-20 | End: 2020-02-22

## 2020-02-20 RX ORDER — INSULIN LISPRO 100/ML
VIAL (ML) SUBCUTANEOUS AT BEDTIME
Refills: 0 | Status: DISCONTINUED | OUTPATIENT
Start: 2020-02-20 | End: 2020-02-22

## 2020-02-20 RX ORDER — INSULIN LISPRO 100/ML
VIAL (ML) SUBCUTANEOUS
Refills: 0 | Status: DISCONTINUED | OUTPATIENT
Start: 2020-02-20 | End: 2020-02-20

## 2020-02-20 RX ORDER — SODIUM CHLORIDE 9 MG/ML
1000 INJECTION, SOLUTION INTRAVENOUS ONCE
Refills: 0 | Status: COMPLETED | OUTPATIENT
Start: 2020-02-20 | End: 2020-02-20

## 2020-02-20 RX ORDER — ADENOSINE 3 MG/ML
6 INJECTION INTRAVENOUS ONCE
Refills: 0 | Status: COMPLETED | OUTPATIENT
Start: 2020-02-20 | End: 2020-02-20

## 2020-02-20 RX ADMIN — ADENOSINE 6 MILLIGRAM(S): 3 INJECTION INTRAVENOUS at 13:42

## 2020-02-20 RX ADMIN — SODIUM CHLORIDE 1000 MILLILITER(S): 9 INJECTION, SOLUTION INTRAVENOUS at 13:53

## 2020-02-20 RX ADMIN — Medication 5 MILLIGRAM(S): at 18:36

## 2020-02-20 RX ADMIN — Medication 0: at 22:25

## 2020-02-20 NOTE — ED PROVIDER NOTE - ATTENDING CONTRIBUTION TO CARE
81 y/o m with pmhx DM type 2, HTNm, HLD, metastatic prostate CA diagnosed 4 yrs ago, presents for abnml heart rate. Random  pulse ox done at home and daughter noted high heart rate. SHe listened to heart rate and noted the same so then called sister (who is a nephrologist) and told to come to the ED. no fever at home. no chills. no cough no recent travel. no leg swelling. no weight changes. denies chest pain or short of braeth. denies weakness / fatigue.    PMD Dr. Lis Ardon  Gen.  elderly appearing male. no apparent distress  HEENT:  perrl eomi pharynx clear.   Lungs:  b/l bs  CVS: S1S2   Abd;  soft non tender no distention  Ext:  trace b/l lower ext pitting edema but no erythema   Neuro: aaox3 no focal deficits  MSK: strength 5/5 b/l upper and lower ext,.

## 2020-02-20 NOTE — H&P ADULT - PROBLEM SELECTOR PLAN 1
EKG not in chart. ?AVNRT   Repeat EKG pending.  Unclear etiology at this point in time. Possible component of dehydration given hyponatremia. ED gave 500 cc of isotonic fluid.  No s/s of infectious process.  No s/s of blood loss anemia. Workup pending.   TTE pending to evaluate for structural disease.  TSH pending.   Continue tele monitoring.   Broke with adenosine 6 mg IVP.   IF recurrent, can try vagal maneuvers first however did not work prior.  EP evaluation pending. Called by ED. EKG not in chart. ?AVNRT   Repeat EKG pending.  Unclear etiology at this point in time. Possible component of dehydration given hyponatremia. ED gave 500 cc of isotonic fluid. Encouraging PO intake.   No s/s of infectious process.  No s/s of blood loss anemia. Workup pending.   TTE pending to evaluate for structural disease.  TSH pending.   Was asymptomatic without anginal equivalents on ROS.  Continue tele monitoring.   Broke with adenosine 6 mg IVP.   IF recurrent, can try vagal maneuvers first however did not work prior.  EP evaluation pending. Called by ED.

## 2020-02-20 NOTE — H&P ADULT - NSHPLABSRESULTS_GEN_ALL_CORE
LABS:                        12.9   7.45  )-----------( 192      ( 20 Feb 2020 13:40 )             40.7     02-20    133<L>  |  95<L>  |  18  ----------------------------<  260<H>  4.4   |  23  |  0.79    Ca    9.4      20 Feb 2020 13:40  Phos  2.8     02-20  Mg     2.2     02-20    TPro  7.1  /  Alb  4.0  /  TBili  0.6  /  DBili  x   /  AST  27  /  ALT  11  /  AlkPhos  158<H>  02-20      ADDITIONAL STUDIES PERSONALLY REVIEWED BY ME:  CXR: no clear acute cardiopulmonary process.  EKG not in chart. Repeat ordered.    PRIOR RECORDS OR OUTPATIENT RECORDS REVIEWED:  Yes [x ] No [ ]  -- outpatient medication

## 2020-02-20 NOTE — H&P ADULT - NSHPPHYSICALEXAM_GEN_ALL_CORE
T(C): 36.6 (02-20-20 @ 13:15), Max: 36.6 (02-20-20 @ 13:15)  T(F): 97.8 (02-20-20 @ 13:15), Max: 97.8 (02-20-20 @ 13:15)  HR: 91 (02-20-20 @ 14:52) (91 - 198)  BP: 124/74 (02-20-20 @ 14:52) (104/80 - 133/106)  ABP: --  ABP(mean): --  RR: 22 (02-20-20 @ 14:52) (18 - 22)  SpO2: 100% (02-20-20 @ 14:52) (97% - 100%)    CONSTITUTIONAL: No acute distress.   HEENT:  Conjunctiva clear B/L. Nasal mucosa normal. Mildly dry oral mucosa.    Cardiovascular: RRR with no murmurs. No JVD noted. No lower extremity edema B/L. Extremities are warm and well perfused. Radial pulses 2+ B/L. Dorsalis pedis pulses 2+ B/L.    Respiratory: Lungs CTAB. No accessory muscle use.   Gastrointestinal:  Soft, nontender. Non-distended. Non-rigid. No CVA tenderness B/L.  MSK:  No joint swelling. No joint erythema B/L. No midline spinal tenderness.  Neurologic:  Alert and awake. Oriented x3. Moving all extremities. Following commands. Making eye contact.    Skin:  No rashes noted. No skin erythema noted.   Psych:  Normal affect. Normal Mood.

## 2020-02-20 NOTE — ED ADULT NURSE NOTE - OBJECTIVE STATEMENT
80 year old male presenting to ED from home, accompanied by daughter, c/o tachycardia. Pt A+Ox3, family reports playing with pulse oximeter at home when pt discovered rapid heart rate in the 190s. Pt reports he's been asymptomatic. PMH includes diabetes and metastatic prostate cancer, currently in treatment. Pt denies headache, dizziness, change in vision, chest pain, SOB, N/V, abdominal pain, recent travel.

## 2020-02-20 NOTE — ED PROVIDER NOTE - CLINICAL SUMMARY MEDICAL DECISION MAKING FREE TEXT BOX
80M p/w stable SVT. MEntating and perfusing well, lungs clear without evidence of edema. Will get labs, electrolytes, tsh, trop, ekg. Will start with vagal maneuvers and escalate adenosine if needed. 80M p/w stable SVT. MEntating and perfusing well, lungs clear without evidence of edema. Will get labs, electrolytes, tsh, trop, ekg. Will start with vagal maneuvers and escalate adenosine if needed.  ATTG: : elevated heart rate concern for svt will give adenosine, check labs, check xray check cardiac work up, electrolytes and re eval for sispo

## 2020-02-20 NOTE — ED ADULT NURSE REASSESSMENT NOTE - NS ED NURSE REASSESS COMMENT FT1
Repeat troponin collected and sent to lab. Pt lying comfortably in stretcher, daughter at bedside, extra blankets and urinal provided. Pt and family aware of plan of care, admitted to telemetry for SVT.

## 2020-02-20 NOTE — ED PROVIDER NOTE - CARDIAC RATE
"              After Visit Summary   1/17/2017    Elise Bernard    MRN: [id number]           Patient Information     Date Of Birth          1953        Visit Information        Provider Department      1/17/2017 1:30 PM Giuseppe Mullins MD [facility]         Follow-ups after your visit        Who to contact     Please call your clinic at [phone] to:    Ask questions about your health    Make or cancel appointments    Discuss your medicines    Learn about your test results    Speak to your doctor   If you have compliments or concerns about an experience at your clinic, or if you wish to file a complaint, please contact [facility] Patient Relations at [phone] or email us at [email or web address]         Additional Information About Your Visit        MyChart Information     [employer] gives you secure access to your electronic health record. If you see a primary care provider, you can also send messages to your care team and make appointments. If you have questions, please call your primary care clinic.  If you do not have a primary care provider, please call [phone] and they will assist you.      [employer] is an electronic gateway that provides easy, online access to your medical records. With [employer], you can request a clinic appointment, read your test results, renew a prescription or communicate with your care team.     To access your existing account, please contact your [facility] Clinic or call [phone] for assistance.        Care EveryWhere ID     This is your Care EveryWhere ID. This could be used by other organizations to access your [place] medical records  [id number]        Your Vitals Were     Pulse Height BMI (Body Mass Index) Breastfeeding?          87 5' 4\" (162.6 cm) 23.09 kg/m2 No         Blood Pressure from Last 3 Encounters:   01/17/17 151/64   01/02/17 147/71   11/25/16 125/71    Weight from Last 3 Encounters: "   01/17/17 134 lb 9.6 oz (61.054 kg)   11/25/16 132 lb (59.875 kg)   08/26/16 123 lb (55.792 kg)              Today, you had the following     No orders found for display       Primary Care Provider Office Phone # Fax #    Myles Birmingham [phone]       [facility] [address]  [place] [address]        Thank you!     Thank you for choosing [facility]  for your care. Our goal is always to provide you with excellent care. Hearing back from our patients is one way we can continue to improve our services. Please take a few minutes to complete the written survey that you may receive in the mail after your visit with us. Thank you!             Your Updated Medication List - Protect others around you: Learn how to safely use, store and throw away your medicines at www.disposemymeds.org.          This list is accurate as of: 1/17/17  3:18 PM.  Always use your most recent med list.                   Brand Name Dispense Instructions for use    ASPIRIN PO      Take 81 mg by mouth daily       donepezil 5 MG tablet    ARICEPT    30 tablet    Take 1 tablet (5 mg) by mouth At Bedtime       escitalopram 10 MG tablet    LEXAPRO         estradiol 0.05 MG/24HR BIW patch    VIVELLE-DOT     Place 1 patch onto the skin twice a week       GEMFIBROZIL PO      Take 600 mg by mouth daily       LIVALO PO      Take 4 mg by mouth daily       METOPROLOL SUCCINATE ER PO      Take 50 mg by mouth daily       OVER-THE-COUNTER      allertec ( antihystamine) 10 mg daily       PROGESTERONE MICRONIZED PO      Take 200 mg by mouth 10 days a month       VITAMIN D (CHOLECALCIFEROL) PO      Take 50,000 Units by mouth once a week          TACHYCARDIC

## 2020-02-20 NOTE — H&P ADULT - HISTORY OF PRESENT ILLNESS
Dr. Carlos Luevano, DO  Attending Physician  Division of Hospital Medicine  [facility]  Pager:  [phone] Dr. Carlos Luevano DO  Attending Physician  Division of Hospital Medicine  [facility]  Pager:  [phone]    History obtained from ED, patient, and his daughter at bedside.    This is a pleasant 80 year old male with PMHx of Type II DM and  metastatic prostate CA who presents because of HR in the 180s which was incidentally found by pulse oximetry by patient's daughter. Patient asymptomatic. No chest pain, dyspnea, palpitations, dizziness, syncope. No fever, chills, nausea, cough, abd pain, headache, or rash. No blood in stool. This is the first time this has happened. No family history of heart disease. Does not drink or take drugs. He reports being a little thirsty.

## 2020-02-20 NOTE — ED PROVIDER NOTE - OBJECTIVE STATEMENT
80M hx dm, metastatic prostate CA, presents w/ heart rate in the 180s, found by daughter incidentally when checking his pulse ox at home. Pt is completely asymptomatic - denies cp, palpitations, trouble breathing, n/v/d, recent infections.

## 2020-02-20 NOTE — ED ADULT NURSE NOTE - NSFALLRSKOUTCOME_ED_ALL_ED
VACCINE ADMINISTRATION RECORD  PARENT / GUARDIAN APPROVAL  Date: 2018  Vaccine administered to: Lilliam Penn     : 10/2/2019    MRN: [id number]    A copy of the appropriate Centers for Disease Control and Prevention Vaccine Information statem Universal Safety Interventions

## 2020-02-20 NOTE — H&P ADULT - ATTENDING COMMENTS
Dr. Carlos Luevano, DO  Attending Physician  Division of Hospital Medicine  [facility]  Pager:  [phone]

## 2020-02-20 NOTE — H&P ADULT - ASSESSMENT
This is a pleasant 80 year old male with PMHx of Type II DM and  metastatic prostate CA who presents w/ SVT.

## 2020-02-20 NOTE — H&P ADULT - PROBLEM SELECTOR PLAN 5
Transitions of Care Status:  1.  Name of PCP: Dr. Ardon  2.  PCP Contacted on Admission: [ ] Y    [ x] N   -- after hours.  3.  PCP contacted at Discharge: [ ] Y    [ ] N    [ ] N/A  4.  Post-Discharge Appointment Date and Location:  5.  Summary of Handoff given to PCP:

## 2020-02-21 DIAGNOSIS — R65.10 SYSTEMIC INFLAMMATORY RESPONSE SYNDROME (SIRS) OF NON-INFECTIOUS ORIGIN WITHOUT ACUTE ORGAN DYSFUNCTION: ICD-10-CM

## 2020-02-21 LAB
ALBUMIN SERPL ELPH-MCNC: 3.5 G/DL — SIGNIFICANT CHANGE UP (ref 3.3–5)
ALP SERPL-CCNC: 131 U/L — HIGH (ref 40–120)
ALT FLD-CCNC: 10 U/L — SIGNIFICANT CHANGE UP (ref 10–45)
ANION GAP SERPL CALC-SCNC: 12 MMOL/L — SIGNIFICANT CHANGE UP (ref 5–17)
APPEARANCE UR: CLEAR — SIGNIFICANT CHANGE UP
AST SERPL-CCNC: 13 U/L — SIGNIFICANT CHANGE UP (ref 10–40)
BASOPHILS # BLD AUTO: 0.02 K/UL — SIGNIFICANT CHANGE UP (ref 0–0.2)
BASOPHILS NFR BLD AUTO: 0.4 % — SIGNIFICANT CHANGE UP (ref 0–2)
BILIRUB SERPL-MCNC: 0.8 MG/DL — SIGNIFICANT CHANGE UP (ref 0.2–1.2)
BILIRUB UR-MCNC: NEGATIVE — SIGNIFICANT CHANGE UP
BUN SERPL-MCNC: 16 MG/DL — SIGNIFICANT CHANGE UP (ref 7–23)
CALCIUM SERPL-MCNC: 8.6 MG/DL — SIGNIFICANT CHANGE UP (ref 8.4–10.5)
CHLORIDE SERPL-SCNC: 100 MMOL/L — SIGNIFICANT CHANGE UP (ref 96–108)
CO2 SERPL-SCNC: 24 MMOL/L — SIGNIFICANT CHANGE UP (ref 22–31)
COLOR SPEC: SIGNIFICANT CHANGE UP
CREAT SERPL-MCNC: 0.72 MG/DL — SIGNIFICANT CHANGE UP (ref 0.5–1.3)
DIFF PNL FLD: NEGATIVE — SIGNIFICANT CHANGE UP
EOSINOPHIL # BLD AUTO: 0.01 K/UL — SIGNIFICANT CHANGE UP (ref 0–0.5)
EOSINOPHIL NFR BLD AUTO: 0.2 % — SIGNIFICANT CHANGE UP (ref 0–6)
FOLATE SERPL-MCNC: 11.8 NG/ML — SIGNIFICANT CHANGE UP
GLUCOSE SERPL-MCNC: 130 MG/DL — HIGH (ref 70–99)
GLUCOSE UR QL: ABNORMAL
HBA1C BLD-MCNC: 7.1 % — HIGH (ref 4–5.6)
HCT VFR BLD CALC: 36.5 % — LOW (ref 39–50)
HGB BLD-MCNC: 11.7 G/DL — LOW (ref 13–17)
IMM GRANULOCYTES NFR BLD AUTO: 1.1 % — SIGNIFICANT CHANGE UP (ref 0–1.5)
KETONES UR-MCNC: SIGNIFICANT CHANGE UP
LEUKOCYTE ESTERASE UR-ACNC: NEGATIVE — SIGNIFICANT CHANGE UP
LYMPHOCYTES # BLD AUTO: 1.2 K/UL — SIGNIFICANT CHANGE UP (ref 1–3.3)
LYMPHOCYTES # BLD AUTO: 21.4 % — SIGNIFICANT CHANGE UP (ref 13–44)
MAGNESIUM SERPL-MCNC: 2.1 MG/DL — SIGNIFICANT CHANGE UP (ref 1.6–2.6)
MCHC RBC-ENTMCNC: 32.1 GM/DL — SIGNIFICANT CHANGE UP (ref 32–36)
MCHC RBC-ENTMCNC: 32.4 PG — SIGNIFICANT CHANGE UP (ref 27–34)
MCV RBC AUTO: 101.1 FL — HIGH (ref 80–100)
MONOCYTES # BLD AUTO: 0.63 K/UL — SIGNIFICANT CHANGE UP (ref 0–0.9)
MONOCYTES NFR BLD AUTO: 11.2 % — SIGNIFICANT CHANGE UP (ref 2–14)
NEUTROPHILS # BLD AUTO: 3.69 K/UL — SIGNIFICANT CHANGE UP (ref 1.8–7.4)
NEUTROPHILS NFR BLD AUTO: 65.7 % — SIGNIFICANT CHANGE UP (ref 43–77)
NITRITE UR-MCNC: NEGATIVE — SIGNIFICANT CHANGE UP
NRBC # BLD: 0 /100 WBCS — SIGNIFICANT CHANGE UP (ref 0–0)
PH UR: 7 — SIGNIFICANT CHANGE UP (ref 5–8)
PHOSPHATE SERPL-MCNC: 2.5 MG/DL — SIGNIFICANT CHANGE UP (ref 2.5–4.5)
PLATELET # BLD AUTO: 163 K/UL — SIGNIFICANT CHANGE UP (ref 150–400)
POTASSIUM SERPL-MCNC: 3.3 MMOL/L — LOW (ref 3.5–5.3)
POTASSIUM SERPL-SCNC: 3.3 MMOL/L — LOW (ref 3.5–5.3)
PROT SERPL-MCNC: 6.2 G/DL — SIGNIFICANT CHANGE UP (ref 6–8.3)
PROT UR-MCNC: NEGATIVE — SIGNIFICANT CHANGE UP
RAPID RVP RESULT: SIGNIFICANT CHANGE UP
RBC # BLD: 3.61 M/UL — LOW (ref 4.2–5.8)
RBC # BLD: 3.61 M/UL — LOW (ref 4.2–5.8)
RBC # FLD: 14 % — SIGNIFICANT CHANGE UP (ref 10.3–14.5)
RETICS #: 80.9 K/UL — SIGNIFICANT CHANGE UP (ref 25–125)
RETICS/RBC NFR: 2.2 % — SIGNIFICANT CHANGE UP (ref 0.5–2.5)
SODIUM SERPL-SCNC: 136 MMOL/L — SIGNIFICANT CHANGE UP (ref 135–145)
SP GR SPEC: 1.02 — SIGNIFICANT CHANGE UP (ref 1.01–1.02)
UROBILINOGEN FLD QL: SIGNIFICANT CHANGE UP
VIT B12 SERPL-MCNC: 216 PG/ML — LOW (ref 232–1245)
WBC # BLD: 5.61 K/UL — SIGNIFICANT CHANGE UP (ref 3.8–10.5)
WBC # FLD AUTO: 5.61 K/UL — SIGNIFICANT CHANGE UP (ref 3.8–10.5)

## 2020-02-21 PROCEDURE — 99223 1ST HOSP IP/OBS HIGH 75: CPT

## 2020-02-21 PROCEDURE — 93306 TTE W/DOPPLER COMPLETE: CPT | Mod: 26

## 2020-02-21 PROCEDURE — 99232 SBSQ HOSP IP/OBS MODERATE 35: CPT

## 2020-02-21 PROCEDURE — 71045 X-RAY EXAM CHEST 1 VIEW: CPT | Mod: 26

## 2020-02-21 RX ORDER — SODIUM CHLORIDE 9 MG/ML
1000 INJECTION INTRAMUSCULAR; INTRAVENOUS; SUBCUTANEOUS ONCE
Refills: 0 | Status: COMPLETED | OUTPATIENT
Start: 2020-02-21 | End: 2020-02-21

## 2020-02-21 RX ORDER — ADENOSINE 3 MG/ML
6 INJECTION INTRAVENOUS ONCE
Refills: 0 | Status: COMPLETED | OUTPATIENT
Start: 2020-02-21 | End: 2020-02-21

## 2020-02-21 RX ORDER — MAGNESIUM SULFATE 500 MG/ML
2 VIAL (ML) INJECTION ONCE
Refills: 0 | Status: COMPLETED | OUTPATIENT
Start: 2020-02-21 | End: 2020-02-21

## 2020-02-21 RX ORDER — POTASSIUM CHLORIDE 20 MEQ
10 PACKET (EA) ORAL
Refills: 0 | Status: COMPLETED | OUTPATIENT
Start: 2020-02-21 | End: 2020-02-21

## 2020-02-21 RX ORDER — POTASSIUM CHLORIDE 20 MEQ
40 PACKET (EA) ORAL ONCE
Refills: 0 | Status: COMPLETED | OUTPATIENT
Start: 2020-02-21 | End: 2020-02-21

## 2020-02-21 RX ORDER — PREGABALIN 225 MG/1
1000 CAPSULE ORAL DAILY
Refills: 0 | Status: DISCONTINUED | OUTPATIENT
Start: 2020-02-21 | End: 2020-02-24

## 2020-02-21 RX ORDER — METOPROLOL TARTRATE 50 MG
25 TABLET ORAL
Refills: 0 | Status: DISCONTINUED | OUTPATIENT
Start: 2020-02-21 | End: 2020-02-22

## 2020-02-21 RX ORDER — ACETAMINOPHEN 500 MG
975 TABLET ORAL ONCE
Refills: 0 | Status: COMPLETED | OUTPATIENT
Start: 2020-02-21 | End: 2020-02-21

## 2020-02-21 RX ORDER — METOPROLOL TARTRATE 50 MG
25 TABLET ORAL ONCE
Refills: 0 | Status: COMPLETED | OUTPATIENT
Start: 2020-02-21 | End: 2020-02-21

## 2020-02-21 RX ADMIN — ADENOSINE 6 MILLIGRAM(S): 3 INJECTION INTRAVENOUS at 08:17

## 2020-02-21 RX ADMIN — Medication 975 MILLIGRAM(S): at 09:20

## 2020-02-21 RX ADMIN — SODIUM CHLORIDE 1000 MILLILITER(S): 9 INJECTION INTRAMUSCULAR; INTRAVENOUS; SUBCUTANEOUS at 09:30

## 2020-02-21 RX ADMIN — Medication 100 MILLIEQUIVALENT(S): at 14:14

## 2020-02-21 RX ADMIN — Medication 25 MILLIGRAM(S): at 09:35

## 2020-02-21 RX ADMIN — Medication 40 MILLIEQUIVALENT(S): at 09:11

## 2020-02-21 RX ADMIN — Medication 5 MILLIGRAM(S): at 17:09

## 2020-02-21 RX ADMIN — Medication 4: at 21:37

## 2020-02-21 RX ADMIN — ADENOSINE 6 MILLIGRAM(S): 3 INJECTION INTRAVENOUS at 09:30

## 2020-02-21 RX ADMIN — Medication 4: at 12:26

## 2020-02-21 RX ADMIN — Medication 100 MILLIEQUIVALENT(S): at 10:00

## 2020-02-21 RX ADMIN — Medication 100 MILLIEQUIVALENT(S): at 09:02

## 2020-02-21 RX ADMIN — Medication 50 GRAM(S): at 08:56

## 2020-02-21 RX ADMIN — ENOXAPARIN SODIUM 40 MILLIGRAM(S): 100 INJECTION SUBCUTANEOUS at 12:29

## 2020-02-21 RX ADMIN — Medication 25 MILLIGRAM(S): at 17:09

## 2020-02-21 RX ADMIN — Medication 5 MILLIGRAM(S): at 10:48

## 2020-02-21 RX ADMIN — Medication 2: at 17:06

## 2020-02-21 RX ADMIN — PREGABALIN 1000 MICROGRAM(S): 225 CAPSULE ORAL at 17:12

## 2020-02-21 NOTE — CHART NOTE - NSCHARTNOTEFT_GEN_A_CORE
[id number]  SINDY GALLO [id number]  STONEYSINDY FOREMAN    Patient S/p RRT x 2 this am after Found Tachycardic HR up to 180-190's w/ SVT on telemetry Monitor.  Pt has PMH of Type II DM and metastatic prostate CA was admitted for HR in the 180's at home on a pulse oximeter. Now with recurrent SVT up to 190's. Patient was asymptomatic during both episodes. Given adenosine 6mg IV x 2 then medically converted. Remained asymptomatic thereafter. Noted to have a low grade temp which was treated  w/ Tylenol and IVF's.  Empiric blood cultures sent and pending. Started on Lopressor 25mg bid for additional rate control. EP consulted and recc to continue on BB, will await EP attending recc's. See RRT sheet for full details.  Pt AAO, Follows all commands, in no acute distress, with Family at bedside. Attending Physician was present and will follow patient closely with the team. Currently patient awaiting Echocardiogram.  Endorse to Floor NP for Follow up on labs, CX, and Echo.     ICU Vital Signs Last 24 Hrs  T(C): 36.6 (21 Feb 2020 12:05), Max: 36.8 (20 Feb 2020 18:30)  T(F): 97.8 (21 Feb 2020 12:05), Max: 98.3 (20 Feb 2020 18:30)  HR: 75 (21 Feb 2020 12:05) (72 - 183)  BP: 115/70 (21 Feb 2020 12:05) (102/85 - 144/78)  BP(mean): --  ABP: --  ABP(mean): --  RR: 18 (21 Feb 2020 12:05) (18 - 22)  SpO2: 96% (21 Feb 2020 12:05) (95% - 100%)    Laxmi Juárez [facility]   Dept of Medicine   [id number] spectra

## 2020-02-21 NOTE — PROGRESS NOTE ADULT - PROBLEM SELECTOR PLAN 1
AVNRT vs A-Tach  -Metoprolol 25 BID   Etiology appears to be driven by fever.  Pancultured.  Await results of same before starting antibiotics   TTE pending to evaluate for structural disease.  TSH normal  Continue tele monitoring.   Broke with adenosine 6 mg

## 2020-02-21 NOTE — PROGRESS NOTE ADULT - PROBLEM SELECTOR PLAN 6
Transitions of Care Status:  1.  Name of PCP: Dr. Ardon  2.  PCP Contacted on Admission: [ ] Y    [ x] N   3.  PCP contacted at Discharge: [ ] Y    [ ] N    [ ] N/A  4.  Post-Discharge Appointment Date and Location:  5.  Summary of Handoff given to PCP:

## 2020-02-21 NOTE — RAPID RESPONSE TEAM SUMMARY - NSSITUATIONBACKGROUNDRRT_GEN_ALL_CORE
Patient is an 80 year old male with PMH of Type II DM and metastatic prostate CA admitted for HR in the 180's at home on a pulse oximeter. RRT was called in the setting of SVT to HR in the 180's. T-97.5, 's/100's, Sat 100% RA. Patient reported being asymptomatic. He received 6mg adenosine, Hr normalized to 90-100s. EKG showed normal sinus rhythm. BP remained stable after adenosine administration. Patient was ordered potassium chloride and magnesium sulfate repletion. Primary team advised to contact cardiology (EP). Plan discussed with family and primary team at bedside.

## 2020-02-21 NOTE — CONSULT NOTE ADULT - SUBJECTIVE AND OBJECTIVE BOX
For all Cardiology service contact information, go to [email or web address] and use "[email or web address]" to login.    HPI:    80 year old male with PMHx of Type II DM and  metastatic prostate CA who presents because of HR in the 180s which was incidentally found by pulse oximetry by patient's daughter. Patient asymptomatic. No chest pain, dyspnea, palpitations, dizziness, syncope. No fever, chills, nausea, cough, abd pain, headache, or rash. Patient had 2 rapid responses called in ED for asymptomatic tachycardia to 180s, which resolved with administration of adenosine 6 mg IV.     PAST MEDICAL & SURGICAL HISTORY:  Diabetes  Prostate cancer: metastatic.  No significant past surgical history    SHx:   -------------------------------------------------------------------------------------------  ROS:  CV: chest pain (-), palpitation (-) PULM: SOB (-), cough (-), wheezing (-), hemoptysis (-). CONST: fever (-), chills (-) or fatigue (-). HEENT:Visual changes (-); vertigo or throat pain (-);  neck stiffness (-). GI: abdominal pain (-) , nausea/vomiting (-), hematemesis, (-)melena (-), hematochezia (-). : dysuria (-), frequency (-), hematuria (-). NEURO: numbness (-), weakness (-). SKIN: itching (-), rash (-)    All other review of systems is negative unless indicated above.   -------------------------------------------------------------------------------------------  VS:  T(C): 36.4 (02-21-20 @ 08:18), Max: 36.8 (02-20-20 @ 18:30)  HR: 93 (02-21-20 @ 08:33) (85 - 198)  BP: 126/81 (02-21-20 @ 08:28) (104/80 - 144/78)  RR: 18 (02-21-20 @ 08:28) (18 - 22)  SpO2: 98% (02-21-20 @ 08:28) (95% - 100%)  I&O's Summary    20 Feb 2020 07:01  -  21 Feb 2020 07:00  --------------------------------------------------------  IN: 0 mL / OUT: 225 mL / NET: -225 mL      -------------------------------------------------------------------------------------------  EXAM:   GEN: No acute distress; well appearing.  CV:  PULM:  HEENT: PERRL, EOMI, No scleral icterus. Normal mucosa.  GI: soft, non-tender, non-distended.   MSK: No joint deformity.  NEURO: Non-focal.  LYMPH: No lymphadenopathy.  PSY: Mood & affect appropriate.  SKIN: No rashes, ecchymoses, or cyanosis.  -------------------------------------------------------------------------------------------  LABS:                        11.7   5.61  )-----------( 163      ( 21 Feb 2020 06:39 )             36.5       02-21    136  |  100  |  16  ----------------------------<  130<H>  3.3<L>   |  24  |  0.72    Ca    8.6      21 Feb 2020 06:39  Phos  2.5     02-21  Mg     2.1     02-21    TPro  6.2  /  Alb  3.5  /  TBili  0.8  /  DBili  x   /  AST  13  /  ALT  10  /  AlkPhos  131<H>  02-21            No significant past surgical history     acetaminophen   Tablet .. 975 milliGRAM(s) Oral once  aDENosine Injectable (ADENOCARD) 6 milliGRAM(s) IV Push once  enoxaparin Injectable 40 milliGRAM(s) SubCutaneous daily  insulin lispro (HumaLOG) corrective regimen sliding scale   SubCutaneous at bedtime  insulin lispro (HumaLOG) corrective regimen sliding scale   SubCutaneous Before meals and at bedtime  metoprolol tartrate 25 milliGRAM(s) Oral two times a day  potassium chloride  10 mEq/100 mL IVPB 10 milliEquivalent(s) IV Intermittent every 1 hour  predniSONE   Tablet 5 milliGRAM(s) Oral two times a day  sodium chloride 0.9% Bolus 1000 milliLiter(s) IV Bolus once     No Known Allergies    -------------------------------------------------------------------------------------------  Current Meds:  acetaminophen   Tablet .. 975 milliGRAM(s) Oral once  aDENosine Injectable (ADENOCARD) 6 milliGRAM(s) IV Push once  enoxaparin Injectable 40 milliGRAM(s) SubCutaneous daily  insulin lispro (HumaLOG) corrective regimen sliding scale   SubCutaneous at bedtime  insulin lispro (HumaLOG) corrective regimen sliding scale   SubCutaneous Before meals and at bedtime  metoprolol tartrate 25 milliGRAM(s) Oral two times a day  potassium chloride  10 mEq/100 mL IVPB 10 milliEquivalent(s) IV Intermittent every 1 hour  predniSONE   Tablet 5 milliGRAM(s) Oral two times a day  sodium chloride 0.9% Bolus 1000 milliLiter(s) IV Bolus once    -------------------------------------------------------------------------------------------  Telemetry reviewed. New ECG in chart reviewed. New Echocardiogram in chart reviewed.  New Stress Testing in chart reviewed. New Cardiac Catheterization in chart reviewed. New imaging reviewed.     -------------------------------------------------------------------------------------------  ASSESSMENT AND PLAN:       Addison Lopez  Cardiology Fellow   [phone]     For all Cardiology service contact information, go to [email or web address] and use "[email or web address]" to login. For all Cardiology service contact information, go to [email or web address] and use "[email or web address]" to login.    HPI:    80 year old male with PMHx of Type II DM and  metastatic prostate CA who presents because of HR in the 180s which was incidentally found by pulse oximetry by patient's daughter. Patient asymptomatic. No chest pain, dyspnea, palpitations, dizziness, syncope. No fever, chills, nausea, cough, abd pain, headache, or rash. Patient had 2 rapid responses called in ED for asymptomatic tachycardia to 180s, which resolved with administration of adenosine 6 mg IV.     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 Unfortunately, patient was not on continuous 12-lead monitoring during adenosine treatment, so no strips of termination of tachycardia are available.     PAST MEDICAL & SURGICAL HISTORY:  Diabetes  Prostate cancer: metastatic.  No significant past surgical history    SHx:   -------------------------------------------------------------------------------------------  ROS:  CV: chest pain (-), palpitation (-) PULM: SOB (-), cough (-), wheezing (-), hemoptysis (-). CONST: fever (-), chills (-) or fatigue (-). HEENT:Visual changes (-); vertigo or throat pain (-);  neck stiffness (-). GI: abdominal pain (-) , nausea/vomiting (-), hematemesis, (-)melena (-), hematochezia (-). : dysuria (-), frequency (-), hematuria (-). NEURO: numbness (-), weakness (-). SKIN: itching (-), rash (-)    All other review of systems is negative unless indicated above.   -------------------------------------------------------------------------------------------  VS:  T(C): 36.4 (02-21-20 @ 08:18), Max: 36.8 (02-20-20 @ 18:30)  HR: 93 (02-21-20 @ 08:33) (85 - 198)  BP: 126/81 (02-21-20 @ 08:28) (104/80 - 144/78)  RR: 18 (02-21-20 @ 08:28) (18 - 22)  SpO2: 98% (02-21-20 @ 08:28) (95% - 100%)  I&O's Summary    20 Feb 2020 07:01  -  21 Feb 2020 07:00  --------------------------------------------------------  IN: 0 mL / OUT: 225 mL / NET: -225 mL      -------------------------------------------------------------------------------------------  EXAM:   GEN: No acute distress; well appearing.  CV:  PULM:  HEENT: PERRL, EOMI, No scleral icterus. Normal mucosa.  GI: soft, non-tender, non-distended.   MSK: No joint deformity.  NEURO: Non-focal.  LYMPH: No lymphadenopathy.  PSY: Mood & affect appropriate.  SKIN: No rashes, ecchymoses, or cyanosis.  -------------------------------------------------------------------------------------------  LABS:                        11.7   5.61  )-----------( 163      ( 21 Feb 2020 06:39 )             36.5       02-21    136  |  100  |  16  ----------------------------<  130<H>  3.3<L>   |  24  |  0.72    Ca    8.6      21 Feb 2020 06:39  Phos  2.5     02-21  Mg     2.1     02-21    TPro  6.2  /  Alb  3.5  /  TBili  0.8  /  DBili  x   /  AST  13  /  ALT  10  /  AlkPhos  131<H>  02-21            No significant past surgical history     acetaminophen   Tablet .. 975 milliGRAM(s) Oral once  aDENosine Injectable (ADENOCARD) 6 milliGRAM(s) IV Push once  enoxaparin Injectable 40 milliGRAM(s) SubCutaneous daily  insulin lispro (HumaLOG) corrective regimen sliding scale   SubCutaneous at bedtime  insulin lispro (HumaLOG) corrective regimen sliding scale   SubCutaneous Before meals and at bedtime  metoprolol tartrate 25 milliGRAM(s) Oral two times a day  potassium chloride  10 mEq/100 mL IVPB 10 milliEquivalent(s) IV Intermittent every 1 hour  predniSONE   Tablet 5 milliGRAM(s) Oral two times a day  sodium chloride 0.9% Bolus 1000 milliLiter(s) IV Bolus once     No Known Allergies    -------------------------------------------------------------------------------------------  Current Meds:  acetaminophen   Tablet .. 975 milliGRAM(s) Oral once  aDENosine Injectable (ADENOCARD) 6 milliGRAM(s) IV Push once  enoxaparin Injectable 40 milliGRAM(s) SubCutaneous daily  insulin lispro (HumaLOG) corrective regimen sliding scale   SubCutaneous at bedtime  insulin lispro (HumaLOG) corrective regimen sliding scale   SubCutaneous Before meals and at bedtime  metoprolol tartrate 25 milliGRAM(s) Oral two times a day  potassium chloride  10 mEq/100 mL IVPB 10 milliEquivalent(s) IV Intermittent every 1 hour  predniSONE   Tablet 5 milliGRAM(s) Oral two times a day  sodium chloride 0.9% Bolus 1000 milliLiter(s) IV Bolus once    -------------------------------------------------------------------------------------------  Telemetry reviewed. New ECG in chart reviewed. New Echocardiogram in chart reviewed.  New Stress Testing in chart reviewed. New Cardiac Catheterization in chart reviewed. New imaging reviewed.     -------------------------------------------------------------------------------------------  ASSESSMENT AND PLAN:     80 M metastatic prostrate Ca - on antiandrogen therapy, DM presenting with SVT incidentally found on home pulse oximeter  Patient feels his heart rate being fast, but otherwise does not endorse any chest pain or dyspnea. 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 Unfortunately, patient was not on continuous 12-lead monitoring during adenosine treatment, so no strips of termination of tachycardia are available.     Plan:   continue metoprolol 25 mg BID for arrythmia suppression   follow up echocardiogram   Will discuss ablation therapy, but given patient's overall clinical picture with metastatic prostrate cancer, and relative lack of symptoms.  would recommend trying medical therapy first.     Addison Lopez  Cardiology Fellow   [phone]     For all Cardiology service contact information, go to [email or web address] and use "Fanplayr" to login. For all Cardiology service contact information, go to [email or web address] and use "[employer]" to login.    HPI:    80 year old male with PMHx of Type II DM and  metastatic prostate CA who presents because of HR in the 180s which was incidentally found by pulse oximetry by patient's daughter. Patient asymptomatic. No chest pain, dyspnea, palpitations, dizziness, syncope. No fever, chills, nausea, cough, abd pain, headache, or rash. Patient had 2 rapid responses called in ED for asymptomatic tachycardia to 180s, which resolved with administration of adenosine 6 mg IV.     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 Unfortunately, patient was not on continuous 12-lead monitoring during adenosine treatment, so no strips of termination of tachycardia are available.     PAST MEDICAL & SURGICAL HISTORY:  Diabetes  Prostate cancer: metastatic.  No significant past surgical history    SHx:   -------------------------------------------------------------------------------------------  ROS:  CV: chest pain (-), palpitation (-) PULM: SOB (-), cough (-), wheezing (-), hemoptysis (-). CONST: fever (-), chills (-) or fatigue (-). HEENT:Visual changes (-); vertigo or throat pain (-);  neck stiffness (-). GI: abdominal pain (-) , nausea/vomiting (-), hematemesis, (-)melena (-), hematochezia (-). : dysuria (-), frequency (-), hematuria (-). NEURO: numbness (-), weakness (-). SKIN: itching (-), rash (-)    All other review of systems is negative unless indicated above.   -------------------------------------------------------------------------------------------  VS:  T(C): 36.4 (02-21-20 @ 08:18), Max: 36.8 (02-20-20 @ 18:30)  HR: 93 (02-21-20 @ 08:33) (85 - 198)  BP: 126/81 (02-21-20 @ 08:28) (104/80 - 144/78)  RR: 18 (02-21-20 @ 08:28) (18 - 22)  SpO2: 98% (02-21-20 @ 08:28) (95% - 100%)  I&O's Summary    20 Feb 2020 07:01  -  21 Feb 2020 07:00  --------------------------------------------------------  IN: 0 mL / OUT: 225 mL / NET: -225 mL      -------------------------------------------------------------------------------------------  EXAM:   GEN: No acute distress; well appearing.  CV:  PULM:  HEENT: PERRL, EOMI, No scleral icterus. Normal mucosa.  GI: soft, non-tender, non-distended.   MSK: No joint deformity.  NEURO: Non-focal.  LYMPH: No lymphadenopathy.  PSY: Mood & affect appropriate.  SKIN: No rashes, ecchymoses, or cyanosis.  -------------------------------------------------------------------------------------------  LABS:                        11.7   5.61  )-----------( 163      ( 21 Feb 2020 06:39 )             36.5       02-21    136  |  100  |  16  ----------------------------<  130<H>  3.3<L>   |  24  |  0.72    Ca    8.6      21 Feb 2020 06:39  Phos  2.5     02-21  Mg     2.1     02-21    TPro  6.2  /  Alb  3.5  /  TBili  0.8  /  DBili  x   /  AST  13  /  ALT  10  /  AlkPhos  131<H>  02-21            No significant past surgical history     acetaminophen   Tablet .. 975 milliGRAM(s) Oral once  aDENosine Injectable (ADENOCARD) 6 milliGRAM(s) IV Push once  enoxaparin Injectable 40 milliGRAM(s) SubCutaneous daily  insulin lispro (HumaLOG) corrective regimen sliding scale   SubCutaneous at bedtime  insulin lispro (HumaLOG) corrective regimen sliding scale   SubCutaneous Before meals and at bedtime  metoprolol tartrate 25 milliGRAM(s) Oral two times a day  potassium chloride  10 mEq/100 mL IVPB 10 milliEquivalent(s) IV Intermittent every 1 hour  predniSONE   Tablet 5 milliGRAM(s) Oral two times a day  sodium chloride 0.9% Bolus 1000 milliLiter(s) IV Bolus once     No Known Allergies    -------------------------------------------------------------------------------------------  Current Meds:  acetaminophen   Tablet .. 975 milliGRAM(s) Oral once  aDENosine Injectable (ADENOCARD) 6 milliGRAM(s) IV Push once  enoxaparin Injectable 40 milliGRAM(s) SubCutaneous daily  insulin lispro (HumaLOG) corrective regimen sliding scale   SubCutaneous at bedtime  insulin lispro (HumaLOG) corrective regimen sliding scale   SubCutaneous Before meals and at bedtime  metoprolol tartrate 25 milliGRAM(s) Oral two times a day  potassium chloride  10 mEq/100 mL IVPB 10 milliEquivalent(s) IV Intermittent every 1 hour  predniSONE   Tablet 5 milliGRAM(s) Oral two times a day  sodium chloride 0.9% Bolus 1000 milliLiter(s) IV Bolus once    -------------------------------------------------------------------------------------------  Telemetry reviewed. New ECG in chart reviewed. New Echocardiogram in chart reviewed.  New Stress Testing in chart reviewed. New Cardiac Catheterization in chart reviewed. New imaging reviewed.     -------------------------------------------------------------------------------------------  ASSESSMENT AND PLAN:     80 M metastatic prostrate Ca - on antiandrogen therapy, DM presenting with SVT incidentally found on home pulse oximeter  Patient feels his heart rate being fast, but otherwise does not endorse any chest pain or dyspnea. 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 Unfortunately, patient was not on continuous 12-lead monitoring during adenosine treatment, so no strips of termination of tachycardia are available.     Plan:   continue metoprolol 25 mg BID for arrythmia suppression   follow up echocardiogram   Will discuss ablation therapy, but given patient's overall clinical picture with metastatic prostrate cancer, and relative lack of symptoms.  would recommend trying medical therapy first.     Addison Lopez  Cardiology Fellow   [phone]     For all Cardiology service contact information, go to [email or web address] and use "[employer]" to login.

## 2020-02-21 NOTE — RAPID RESPONSE TEAM SUMMARY - NSSITUATIONBACKGROUNDRRT_GEN_ALL_CORE
Patient is an 80 year old male with PMH of Type II DM and metastatic prostate CA admitted for HR in the 180's at home on a pulse oximeter. RRT was called again in the setting to HR in the 180's. T-100.6, -130's/90's-100's, Sat 100% RA. Patient reported being asymptomatic. He received 6mg adenosine  and 1L NS.  Hr normalized to 90-100s. EKG showed normal sinus rhythm. BP remained stable throughout rapid. Patient was given Tylenol PO. Blood cultures, u/a, urine culture, RVP and cxr was ordered. Metoprolol tartrate to be given for rate control. Primary team advised to contact cardiology (EP) and follow up on labs. Plan discussed with primary team.

## 2020-02-21 NOTE — PROGRESS NOTE ADULT - PROBLEM SELECTOR PLAN 4
Metastatic  On Zytiga -- need oncology approval.  On prednisone 5 mg po bid.   Also on Zometa and Leupron periodically.

## 2020-02-21 NOTE — PROGRESS NOTE ADULT - SUBJECTIVE AND OBJECTIVE BOX
[facility] Division of Hospital Medicine  Jimmy Lewis MD  Pager  [phone]    Patient is a 80y old  Male who presents with a chief complaint of SVT (21 Feb 2020 10:02)      SUBJECTIVE / OVERNIGHT EVENTS:  patient had RRT overnight and this morning.  ADDITIONAL REVIEW OF SYSTEMS:  reports no chest pain or shortness of breath     MEDICATIONS  (STANDING):  cyanocobalamin 1000 MICROGram(s) Oral daily  enoxaparin Injectable 40 milliGRAM(s) SubCutaneous daily  insulin lispro (HumaLOG) corrective regimen sliding scale   SubCutaneous at bedtime  insulin lispro (HumaLOG) corrective regimen sliding scale   SubCutaneous Before meals and at bedtime  metoprolol tartrate 25 milliGRAM(s) Oral two times a day  potassium chloride  10 mEq/100 mL IVPB 10 milliEquivalent(s) IV Intermittent every 1 hour  predniSONE   Tablet 5 milliGRAM(s) Oral two times a day    MEDICATIONS  (PRN):      CAPILLARY BLOOD GLUCOSE      POCT Blood Glucose.: 216 mg/dL (21 Feb 2020 12:06)  POCT Blood Glucose.: 132 mg/dL (21 Feb 2020 09:21)  POCT Blood Glucose.: 114 mg/dL (21 Feb 2020 08:07)  POCT Blood Glucose.: 205 mg/dL (20 Feb 2020 22:04)  POCT Blood Glucose.: 119 mg/dL (20 Feb 2020 18:26)    I&O's Summary    20 Feb 2020 07:01  -  21 Feb 2020 07:00  --------------------------------------------------------  IN: 0 mL / OUT: 225 mL / NET: -225 mL        PHYSICAL EXAM:  Vital Signs Last 24 Hrs  T(C): 36.6 (21 Feb 2020 12:05), Max: 36.8 (20 Feb 2020 18:30)  T(F): 97.8 (21 Feb 2020 12:05), Max: 98.3 (20 Feb 2020 18:30)  HR: 75 (21 Feb 2020 12:05) (72 - 183)  BP: 115/70 (21 Feb 2020 12:05) (102/85 - 144/78)  BP(mean): --  RR: 18 (21 Feb 2020 12:05) (18 - 22)  SpO2: 96% (21 Feb 2020 12:05) (95% - 100%)    CONSTITUTIONAL: NAD, well-developed   EYES:  ; conjunctiva and sclera clear  ENMT: Moist oral mucosa, no pharyngeal injection    NECK: Supple,   RESPIRATORY: Normal respiratory effort; lungs are clear    CARDIOVASCULAR: Regular rate and rhythm, normal S1 and S2, no murmur/   ABDOMEN: Nontender to palpation, normoactive bowel sounds, no rebound   MUSCLOSKELETAL:  Normal gait;   PSYCH: A+O to person, place, and time; affect appropriate  NEUROLOGY: CN 2-12 are intact and symmetric; no gross sensory deficits;   SKIN: No rashes; no palpable lesions    LABS:                        11.7   5.61  )-----------( 163      ( 21 Feb 2020 06:39 )             36.5     02-21    136  |  100  |  16  ----------------------------<  130<H>  3.3<L>   |  24  |  0.72    Ca    8.6      21 Feb 2020 06:39  Phos  2.5     02-21  Mg     2.1     02-21    TPro  6.2  /  Alb  3.5  /  TBili  0.8  /  DBili  x   /  AST  13  /  ALT  10  /  AlkPhos  131<H>  02-21                RADIOLOGY & ADDITIONAL TESTS:  Results Reviewed:   Imaging Personally Reviewed:  Electrocardiogram Personally Reviewed:    COORDINATION OF CARE:  Care Discussed with Consultants/Other Providers [Y/N]:  Prior or Outpatient Records Reviewed [Y/N]:

## 2020-02-22 LAB
ANION GAP SERPL CALC-SCNC: 12 MMOL/L — SIGNIFICANT CHANGE UP (ref 5–17)
BUN SERPL-MCNC: 16 MG/DL — SIGNIFICANT CHANGE UP (ref 7–23)
CALCIUM SERPL-MCNC: 8.6 MG/DL — SIGNIFICANT CHANGE UP (ref 8.4–10.5)
CHLORIDE SERPL-SCNC: 101 MMOL/L — SIGNIFICANT CHANGE UP (ref 96–108)
CO2 SERPL-SCNC: 23 MMOL/L — SIGNIFICANT CHANGE UP (ref 22–31)
CREAT SERPL-MCNC: 0.7 MG/DL — SIGNIFICANT CHANGE UP (ref 0.5–1.3)
GLUCOSE SERPL-MCNC: 154 MG/DL — HIGH (ref 70–99)
HCT VFR BLD CALC: 38.2 % — LOW (ref 39–50)
HGB BLD-MCNC: 12.5 G/DL — LOW (ref 13–17)
MAGNESIUM SERPL-MCNC: 2.3 MG/DL — SIGNIFICANT CHANGE UP (ref 1.6–2.6)
MCHC RBC-ENTMCNC: 32.6 PG — SIGNIFICANT CHANGE UP (ref 27–34)
MCHC RBC-ENTMCNC: 32.7 GM/DL — SIGNIFICANT CHANGE UP (ref 32–36)
MCV RBC AUTO: 99.7 FL — SIGNIFICANT CHANGE UP (ref 80–100)
NRBC # BLD: 0 /100 WBCS — SIGNIFICANT CHANGE UP (ref 0–0)
PHOSPHATE SERPL-MCNC: 2.8 MG/DL — SIGNIFICANT CHANGE UP (ref 2.5–4.5)
PLATELET # BLD AUTO: 189 K/UL — SIGNIFICANT CHANGE UP (ref 150–400)
POTASSIUM SERPL-MCNC: 4 MMOL/L — SIGNIFICANT CHANGE UP (ref 3.5–5.3)
POTASSIUM SERPL-SCNC: 4 MMOL/L — SIGNIFICANT CHANGE UP (ref 3.5–5.3)
RBC # BLD: 3.83 M/UL — LOW (ref 4.2–5.8)
RBC # FLD: 13.7 % — SIGNIFICANT CHANGE UP (ref 10.3–14.5)
SODIUM SERPL-SCNC: 136 MMOL/L — SIGNIFICANT CHANGE UP (ref 135–145)
WBC # BLD: 6.26 K/UL — SIGNIFICANT CHANGE UP (ref 3.8–10.5)
WBC # FLD AUTO: 6.26 K/UL — SIGNIFICANT CHANGE UP (ref 3.8–10.5)

## 2020-02-22 PROCEDURE — 99233 SBSQ HOSP IP/OBS HIGH 50: CPT

## 2020-02-22 PROCEDURE — 93010 ELECTROCARDIOGRAM REPORT: CPT | Mod: 77

## 2020-02-22 PROCEDURE — 93010 ELECTROCARDIOGRAM REPORT: CPT

## 2020-02-22 RX ORDER — METOPROLOL TARTRATE 50 MG
25 TABLET ORAL ONCE
Refills: 0 | Status: COMPLETED | OUTPATIENT
Start: 2020-02-22 | End: 2020-02-22

## 2020-02-22 RX ORDER — METOPROLOL TARTRATE 50 MG
25 TABLET ORAL EVERY 8 HOURS
Refills: 0 | Status: DISCONTINUED | OUTPATIENT
Start: 2020-02-22 | End: 2020-02-22

## 2020-02-22 RX ORDER — ABIRATERONE ACETATE 250 MG/1
1000 TABLET ORAL DAILY
Refills: 0 | Status: DISCONTINUED | OUTPATIENT
Start: 2020-02-22 | End: 2020-02-22

## 2020-02-22 RX ORDER — ENOXAPARIN SODIUM 100 MG/ML
40 INJECTION SUBCUTANEOUS DAILY
Refills: 0 | Status: DISCONTINUED | OUTPATIENT
Start: 2020-02-22 | End: 2020-02-24

## 2020-02-22 RX ORDER — AMIODARONE HYDROCHLORIDE 400 MG/1
400 TABLET ORAL EVERY 12 HOURS
Refills: 0 | Status: DISCONTINUED | OUTPATIENT
Start: 2020-02-22 | End: 2020-02-24

## 2020-02-22 RX ORDER — METOPROLOL TARTRATE 50 MG
25 TABLET ORAL EVERY 8 HOURS
Refills: 0 | Status: DISCONTINUED | OUTPATIENT
Start: 2020-02-22 | End: 2020-02-24

## 2020-02-22 RX ADMIN — AMIODARONE HYDROCHLORIDE 400 MILLIGRAM(S): 400 TABLET ORAL at 17:12

## 2020-02-22 RX ADMIN — Medication 5 MILLIGRAM(S): at 17:12

## 2020-02-22 RX ADMIN — Medication 5 MILLIGRAM(S): at 05:10

## 2020-02-22 RX ADMIN — Medication 2: at 08:16

## 2020-02-22 RX ADMIN — Medication 25 MILLIGRAM(S): at 15:56

## 2020-02-22 RX ADMIN — ENOXAPARIN SODIUM 40 MILLIGRAM(S): 100 INJECTION SUBCUTANEOUS at 17:12

## 2020-02-22 RX ADMIN — Medication 4: at 12:25

## 2020-02-22 RX ADMIN — Medication 25 MILLIGRAM(S): at 22:24

## 2020-02-22 RX ADMIN — Medication 25 MILLIGRAM(S): at 02:29

## 2020-02-22 RX ADMIN — PREGABALIN 1000 MICROGRAM(S): 225 CAPSULE ORAL at 12:25

## 2020-02-22 RX ADMIN — Medication 2: at 22:23

## 2020-02-22 NOTE — CHART NOTE - NSCHARTNOTEFT_GEN_A_CORE
MEDICINE NP    Notified by RN patient with sustained SVT at 140bpm. Seen and examined patient at bedside. Patient is alert, NAD. Denies HA, CP, SOB, cough, N/V, or abd pain. called cardiology fellow Jordan Scales, he made recommendations to change lopressor from BID to TID.     VITAL SIGNS:  T(C): 36.9 (02-21-20 @ 20:29), Max: 36.9 (02-21-20 @ 20:29)  HR: 77 (02-21-20 @ 20:29) (72 - 177)  BP: 122/65 (02-21-20 @ 20:29) (102/85 - 128/104)  RR: 18 (02-21-20 @ 20:29) (18 - 18)  SpO2: 97% (02-21-20 @ 20:29) (95% - 98%)  Wt(kg): --

## 2020-02-22 NOTE — PROGRESS NOTE ADULT - PROBLEM SELECTOR PLAN 1
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  Hold Zytiga due to prolonged QTc

## 2020-02-22 NOTE — PROGRESS NOTE ADULT - PROBLEM SELECTOR PLAN 2
Febrile 100.6 and tachycardiac  Pancultured - no identifiable source for now  monitor off abx as no apparent infection

## 2020-02-22 NOTE — PROGRESS NOTE ADULT - SUBJECTIVE AND OBJECTIVE BOX
Chief Complaint:    HPI: This is a pleasant 80 year old male with PMHx of Type II DM and  metastatic prostate CA who presents because of HR in the 180s which was incidentally found by pulse oximetry by patient's daughter. Patient asymptomatic. No chest pain, dyspnea, palpitations, dizziness, syncope. No fever, chills, nausea, cough, abd pain, headache, or rash. No blood in stool. This is the first time this has happened. No family history of heart disease. Does not drink or take drugs. He reports being a little thirsty.     He was diagnosed with metastatic prostate cancer 4 years ago and is followed by Dr. Dougherty. 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 He receives Zometa and Lupron, and is due to get both in our office in early March. He has received Casodex and Xtandi, eventually progressing on both, and is now on Zytiga 1000 mg a day.      ROS: negative for palpitations. all systems reviewed and are negative      PAST MEDICAL & SURGICAL HISTORY:  Diabetes  Prostate cancer: metastatic.  No significant past surgical history      SOCIAL HISTORY:  Smoking - Non smoker   Alcohol - Social  Drugs - No drug use    FAMILY HISTORY:  No pertinent family history in first degree relatives      MEDICATIONS  (STANDING):  cyanocobalamin 1000 MICROGram(s) Oral daily  insulin lispro (HumaLOG) corrective regimen sliding scale   SubCutaneous at bedtime  insulin lispro (HumaLOG) corrective regimen sliding scale   SubCutaneous Before meals and at bedtime  metoprolol tartrate 25 milliGRAM(s) Oral every 8 hours  predniSONE   Tablet 5 milliGRAM(s) Oral two times a day    MEDICATIONS  (PRN):      Vital Signs Last 24 Hrs  T(C): 36.4 (22 Feb 2020 11:00), Max: 36.9 (21 Feb 2020 20:29)  T(F): 97.5 (22 Feb 2020 11:00), Max: 98.4 (21 Feb 2020 20:29)  HR: 72 (22 Feb 2020 11:00) (64 - 77)  BP: 96/58 (22 Feb 2020 11:00) (96/58 - 142/73)  BP(mean): --  RR: 18 (22 Feb 2020 11:00) (18 - 18)  SpO2: 96% (22 Feb 2020 11:00) (96% - 99%)      PHYSICAL EXAM:      Constitutional: patient in NAD    Eyes: anicteric    ENMT:    Neck:    Lymph nodes: none    Respiratory: clear    Cardiovascular: RRR    Gastrointestinal: nontender    Extremities: no edema    Skin:                    LABS:                          12.5   6.26  )-----------( 189      ( 22 Feb 2020 02:49 )             38.2         Mean Cell Volume : 99.7 fl  Mean Cell Hemoglobin : 32.6 pg  Mean Cell Hemoglobin Concentration : 32.7 gm/dL  Auto Neutrophil # : x  Auto Lymphocyte # : x  Auto Monocyte # : x  Auto Eosinophil # : x  Auto Basophil # : x  Auto Neutrophil % : x  Auto Lymphocyte % : x  Auto Monocyte % : x  Auto Eosinophil % : x  Auto Basophil % : x      Serial CBC's  02-22 @ 02:49  Hct-38.2 / Hgb-12.5 / Plat-189 / RBC-3.83 / WBC-6.26  Serial CBC's  02-21 @ 06:39  Hct-36.5 / Hgb-11.7 / Plat-163 / RBC-3.61 / WBC-5.61  Serial CBC's  02-20 @ 13:40  Hct-40.7 / Hgb-12.9 / Plat-192 / RBC-4.00 / WBC-7.45      02-22    136  |  101  |  16  ----------------------------<  154<H>  4.0   |  23  |  0.70    Ca    8.6      22 Feb 2020 02:49  Phos  2.8     02-22  Mg     2.3     02-22    TPro  6.2  /  Alb  3.5  /  TBili  0.8  /  DBili  x   /  AST  13  /  ALT  10  /  AlkPhos  131<H>  02-21          Vitamin B12, Serum: 216 pg/mL (02-21 @ 09:19)  Folate, Serum: 11.8 ng/mL (02-21 @ 09:19)  Reticulocyte Percent: 2.2 % (02-21 @ 06:39)

## 2020-02-22 NOTE — PROGRESS NOTE ADULT - SUBJECTIVE AND OBJECTIVE BOX
24H hour events: Patient without complaints. Tele had episode of AT overnight lasting ~30minutes    MEDICATIONS:  metoprolol tartrate 25 milliGRAM(s) Oral every 8 hours  insulin lispro (HumaLOG) corrective regimen sliding scale   SubCutaneous at bedtime  insulin lispro (HumaLOG) corrective regimen sliding scale   SubCutaneous Before meals and at bedtime  predniSONE   Tablet 5 milliGRAM(s) Oral two times a day  cyanocobalamin 1000 MICROGram(s) Oral daily      REVIEW OF SYSTEMS:  See HPI, otherwise ROS negative.    PHYSICAL EXAM:  T(C): 36.4 (02-22-20 @ 11:00), Max: 36.9 (02-21-20 @ 20:29)  HR: 72 (02-22-20 @ 11:00) (64 - 77)  BP: 96/58 (02-22-20 @ 11:00) (96/58 - 142/73)  RR: 18 (02-22-20 @ 11:00) (18 - 18)  SpO2: 96% (02-22-20 @ 11:00) (96% - 99%)  Wt(kg): --  I&O's Summary    21 Feb 2020 07:01  -  22 Feb 2020 07:00  --------------------------------------------------------  IN: 480 mL / OUT: 1625 mL / NET: -1145 mL    22 Feb 2020 07:01  -  22 Feb 2020 14:43  --------------------------------------------------------  IN: 480 mL / OUT: 0 mL / NET: 480 mL        Appearance: Alert. NAD	  Cardiovascular: +S1S2 RRR no m/g/r  Respiratory: CTA B/L	  Psychiatry: A & O x 3, Mood & affect appropriate  Neurologic: Non-focal  Extremities: No edema BLE  Vascular: Peripheral pulses palpable 2+ bilaterally      LABS:	 	    CBC Full  -  ( 22 Feb 2020 02:49 )  WBC Count : 6.26 K/uL  Hemoglobin : 12.5 g/dL  Hematocrit : 38.2 %  Platelet Count - Automated : 189 K/uL  Mean Cell Volume : 99.7 fl  Mean Cell Hemoglobin : 32.6 pg  Mean Cell Hemoglobin Concentration : 32.7 gm/dL    02-22    136  |  101  |  16  ----------------------------<  154<H>  4.0   |  23  |  0.70  02-21    136  |  100  |  16  ----------------------------<  130<H>  3.3<L>   |  24  |  0.72    Ca    8.6      22 Feb 2020 02:49  Ca    8.6      21 Feb 2020 06:39  Phos  2.8     02-22  Phos  2.5     02-21  Mg     2.3     02-22  Mg     2.1     02-21    TPro  6.2  /  Alb  3.5  /  TBili  0.8  /  DBili  x   /  AST  13  /  ALT  10  /  AlkPhos  131<H>  02-21    TELEMETRY: SR; episode of AT w/ 2:1 then 1:1 conduction ~1:15am that lasted ~30minutes      	  ASSESSMENT/PLAN: 	  81y/o male h/o metastatic prostrate Ca on antiandrogen therapy, DM p/w SVT that terminated w/ adenosine. Had recurrent SVT (tele strips demonstrate AT) that went from 2:1 to 1:1 conduction overnight that lasted ~30minutes  1. Recurrent AT  2. Metastatic prostate CA    --Recommend starting amiodarone 400mg BID for 5gm then decrease to 200mg daily. Baseline TFT/LFTs WNL. Will need outpatient monitoring of TFT/PFT/LFT/ ophthamology F/U.   --Continue to monitor on telemetry    Tori Paredes PA-C

## 2020-02-22 NOTE — PROVIDER CONTACT NOTE (OTHER) - BACKGROUND
21: S/p RRT x 2 for SVT to 180's-190 given adenosine 6mg IVP x 2 along w/ Loprressor x 1 25mg in ED. Temp slightly elevated, Abx held for now d/t unclear source, BC x 2 pending

## 2020-02-22 NOTE — CHART NOTE - NSCHARTNOTEFT_GEN_A_CORE
EP Fellow Brief Note    Asked to eval this pt for SVT. Briefly, 80M w/ metastatic prostate CA found to have SVT today suspected to be AVNRT vs ATach. Pt again had run of SVT that spontaneously resolved. Pt hemodynamically stable during ep. Pt reported palps, but no other sxs incl CP/dyspnea/presyncope/syncope during episode.    VSS  Gen: NAD.  HEENT: NCAT. PERRLA b/l.  Neck: No JVP elev.  CV: Normal S1, S2. RRR. No MRG.  Chest: CTAB. No WRR.  Abd: +BSx4. Soft. NTND.  Ext: No LE edema.  Skin: No cyanosis.    Labs noted    A/P:  #SVT  -Spont resolved now  -Incr BB metop to 25mg PO Q8H; first dose now  -Maintain lytes K>4, Mg>2  -Would ensure hypoK from this AM repleted and repeat BMP  -C/w cardiac monitoring  -Plan to titrate BB as necessary/tolerated    Jordan Scales MD  Cardiology Fellow  [phone]  All Cardiology service information can be found 24/7 on [email or web address], password: [email or web address]

## 2020-02-22 NOTE — PROGRESS NOTE ADULT - SUBJECTIVE AND OBJECTIVE BOX
Patient is a 80y old  Male who presents with a chief complaint of SVT (2020 14:43)      INTERVAL History of Present Illness/OVERNIGHT EVENTS: overnight events and EP recs noted    MEDICATIONS  (STANDING):  aMIOdarone    Tablet 400 milliGRAM(s) Oral every 12 hours  cyanocobalamin 1000 MICROGram(s) Oral daily  insulin lispro (HumaLOG) corrective regimen sliding scale   SubCutaneous at bedtime  insulin lispro (HumaLOG) corrective regimen sliding scale   SubCutaneous Before meals and at bedtime  metoprolol tartrate 25 milliGRAM(s) Oral every 8 hours  predniSONE   Tablet 5 milliGRAM(s) Oral two times a day    MEDICATIONS  (PRN):      Allergies    No Known Allergies    Intolerances        REVIEW OF SYSTEMS:  Negative unless otherwise specified above.    Vital Signs Last 24 Hrs  T(C): 36.4 (2020 11:00), Max: 36.9 (2020 20:29)  T(F): 97.5 (2020 11:00), Max: 98.4 (2020 20:29)  HR: 72 (2020 11:00) (64 - 77)  BP: 96/58 (2020 11:00) (96/58 - 142/73)  BP(mean): --  RR: 18 (2020 11:00) (18 - 18)  SpO2: 96% (2020 11:00) (96% - 99%)        PHYSICAL EXAM:  GENERAL: No apparent distress, appears stated age  HEAD:  Atraumatic, Normocephalic  EYES: Conjunctiva and sclera clear, no discharge  ENMT: Moist mucous membranes, no nasal discharge  NECK: Supple, no JVD  CHEST/LUNG: Clear to auscultation bilaterally, no wheeze or rales  HEART: Regular rhythm, no rubs or gallops  ABDOMEN: Soft, Nontender, Nondistended; Bowel sounds present  EXTREMITIES:  No clubbing, cyanosis or edema  SKIN: No rash or new discoloration  NERVOUS SYSTEM:  Alert & Oriented; Bilateral Lower extremity mobile, sensation to light touch intact      LABS:                        12.5   6.26  )-----------( 189      ( 2020 02:49 )             38.2     2020 02:49    136    |  101    |  16     ----------------------------<  154    4.0     |  23     |  0.70     Ca    8.6        2020 02:49  Phos  2.8       2020 02:49  Mg     2.3       2020 02:49        Urinalysis Basic - ( 2020 19:45 )    Color: Light Yellow / Appearance: Clear / S.017 / pH: x  Gluc: x / Ketone: Trace  / Bili: Negative / Urobili: <2 mg/dL   Blood: x / Protein: Negative / Nitrite: Negative   Leuk Esterase: Negative / RBC: x / WBC x   Sq Epi: x / Non Sq Epi: x / Bacteria: x      CAPILLARY BLOOD GLUCOSE      POCT Blood Glucose.: 223 mg/dL (2020 12:16)  POCT Blood Glucose.: 155 mg/dL (2020 07:57)  POCT Blood Glucose.: 209 mg/dL (2020 21:10)  POCT Blood Glucose.: 163 mg/dL (2020 16:38)      RADIOLOGY & ADDITIONAL TESTS:    Images reviewed personally    Consultant Notes Reviewed and Care Discussed with relevant Consultants/Other Providers.

## 2020-02-22 NOTE — PROGRESS NOTE ADULT - PROBLEM SELECTOR PLAN 1
continue Zytiga as inpatient, patient has own supply  outpatient Lupron  will need to consider whether to continue Zometa which can have arrhythmogenic potential.

## 2020-02-23 DIAGNOSIS — C61 MALIGNANT NEOPLASM OF PROSTATE: ICD-10-CM

## 2020-02-23 LAB
ANION GAP SERPL CALC-SCNC: 16 MMOL/L — SIGNIFICANT CHANGE UP (ref 5–17)
BUN SERPL-MCNC: 17 MG/DL — SIGNIFICANT CHANGE UP (ref 7–23)
CALCIUM SERPL-MCNC: 8.7 MG/DL — SIGNIFICANT CHANGE UP (ref 8.4–10.5)
CHLORIDE SERPL-SCNC: 99 MMOL/L — SIGNIFICANT CHANGE UP (ref 96–108)
CO2 SERPL-SCNC: 23 MMOL/L — SIGNIFICANT CHANGE UP (ref 22–31)
CREAT SERPL-MCNC: 0.7 MG/DL — SIGNIFICANT CHANGE UP (ref 0.5–1.3)
GLUCOSE SERPL-MCNC: 156 MG/DL — HIGH (ref 70–99)
POTASSIUM SERPL-MCNC: 3.7 MMOL/L — SIGNIFICANT CHANGE UP (ref 3.5–5.3)
POTASSIUM SERPL-SCNC: 3.7 MMOL/L — SIGNIFICANT CHANGE UP (ref 3.5–5.3)
PROCALCITONIN SERPL-MCNC: 0.07 NG/ML — SIGNIFICANT CHANGE UP (ref 0.02–0.1)
SODIUM SERPL-SCNC: 138 MMOL/L — SIGNIFICANT CHANGE UP (ref 135–145)

## 2020-02-23 PROCEDURE — 99233 SBSQ HOSP IP/OBS HIGH 50: CPT

## 2020-02-23 RX ADMIN — Medication 5 MILLIGRAM(S): at 05:11

## 2020-02-23 RX ADMIN — Medication 4: at 12:48

## 2020-02-23 RX ADMIN — Medication 25 MILLIGRAM(S): at 21:16

## 2020-02-23 RX ADMIN — Medication 2: at 08:17

## 2020-02-23 RX ADMIN — PREGABALIN 1000 MICROGRAM(S): 225 CAPSULE ORAL at 13:09

## 2020-02-23 RX ADMIN — Medication 5 MILLIGRAM(S): at 17:42

## 2020-02-23 RX ADMIN — AMIODARONE HYDROCHLORIDE 400 MILLIGRAM(S): 400 TABLET ORAL at 05:12

## 2020-02-23 RX ADMIN — Medication 4: at 22:18

## 2020-02-23 RX ADMIN — Medication 25 MILLIGRAM(S): at 05:11

## 2020-02-23 RX ADMIN — ENOXAPARIN SODIUM 40 MILLIGRAM(S): 100 INJECTION SUBCUTANEOUS at 12:47

## 2020-02-23 RX ADMIN — AMIODARONE HYDROCHLORIDE 400 MILLIGRAM(S): 400 TABLET ORAL at 17:41

## 2020-02-23 NOTE — PROGRESS NOTE ADULT - SUBJECTIVE AND OBJECTIVE BOX
HPI:  met hormone refractory prostate cancer, on Lupron, Zometa and Zytiga  admitted with SVT  prolonged QTc yesterday so zytiga held and amio started  no complaints  family at bedside          ROS:  all systems reviewed and are negative    MEDICATIONS  (STANDING):  aMIOdarone    Tablet 400 milliGRAM(s) Oral every 12 hours  cyanocobalamin 1000 MICROGram(s) Oral daily  enoxaparin Injectable 40 milliGRAM(s) SubCutaneous daily  insulin lispro (HumaLOG) corrective regimen sliding scale   SubCutaneous Before meals and at bedtime  metoprolol tartrate 25 milliGRAM(s) Oral every 8 hours  predniSONE   Tablet 5 milliGRAM(s) Oral two times a day    MEDICATIONS  (PRN):      Allergies    No Known Allergies    Intolerances        Vital Signs Last 24 Hrs  T(C): 36.5 (23 Feb 2020 11:28), Max: 36.7 (22 Feb 2020 21:46)  T(F): 97.7 (23 Feb 2020 11:28), Max: 98.1 (22 Feb 2020 21:46)  HR: 62 (23 Feb 2020 11:28) (61 - 145)  BP: 111/66 (23 Feb 2020 11:28) (111/66 - 144/105)  BP(mean): --  RR: 17 (23 Feb 2020 11:28) (17 - 18)  SpO2: 98% (23 Feb 2020 11:28) (96% - 98%)    PHYSICAL EXAM:      Constitutional: appears in NAD    Eyes: anicteric    ENMT:    Neck:    Lymph nodes:    Respiratory: clear    Cardiovascular: irreg    Gastrointestinal: nontender    Extremities: no edema    Skin:                    LABS:                          12.5   6.26  )-----------( 189      ( 22 Feb 2020 02:49 )             38.2         Mean Cell Volume : 99.7 fl  Mean Cell Hemoglobin : 32.6 pg  Mean Cell Hemoglobin Concentration : 32.7 gm/dL  Auto Neutrophil # : x  Auto Lymphocyte # : x  Auto Monocyte # : x  Auto Eosinophil # : x  Auto Basophil # : x  Auto Neutrophil % : x  Auto Lymphocyte % : x  Auto Monocyte % : x  Auto Eosinophil % : x  Auto Basophil % : x    Serial CBC  Hematocrit 38.2  Hemoglobin 12.5  Plat 189  RBC 3.83  WBC 6.26  Serial CBC  Hematocrit 36.5  Hemoglobin 11.7  Plat 163  RBC 3.61  WBC 5.61  Serial CBC  Hematocrit 40.7  Hemoglobin 12.9  Plat 192  RBC 4.00  WBC 7.45    02-23    138  |  99  |  17  ----------------------------<  156<H>  3.7   |  23  |  0.70    Ca    8.7      23 Feb 2020 06:06  Phos  2.8     02-22  Mg     2.3     02-22

## 2020-02-23 NOTE — PROVIDER CONTACT NOTE (MEDICATION) - ACTION/TREATMENT ORDERED:
A per provider, patient had already received PM beta blocker, continue to monitor patient for repeat event.

## 2020-02-23 NOTE — PROGRESS NOTE ADULT - PROBLEM SELECTOR PLAN 2
hold Zytiga  will likely hold Zometa which he is due to receive as outpatient  Lupron as outpatient  he has had marginal control of his disease with Zytiga and it might be time to change therapy particularly with QTc prolongation

## 2020-02-23 NOTE — PROVIDER CONTACT NOTE (MEDICATION) - BACKGROUND
Patient admitted with SVTs <190.  Patient presented previous night with similar episode lasting 1hr.

## 2020-02-23 NOTE — PROGRESS NOTE ADULT - PROBLEM SELECTOR PLAN 4
Metastatic  On prednisone 5 mg po bid.   Also on Zometa and Leupron periodically.  Zytiga on hold  appreciate ONC input

## 2020-02-23 NOTE — PROGRESS NOTE ADULT - SUBJECTIVE AND OBJECTIVE BOX
Patient is a 80y old  Male who presents with a chief complaint of SVT (2020 13:12)      INTERVAL History of Present Illness/OVERNIGHT EVENTS: family has questions about Amio initiation - attempting to reach EP  tele: PAT    MEDICATIONS  (STANDING):  aMIOdarone    Tablet 400 milliGRAM(s) Oral every 12 hours  cyanocobalamin 1000 MICROGram(s) Oral daily  enoxaparin Injectable 40 milliGRAM(s) SubCutaneous daily  insulin lispro (HumaLOG) corrective regimen sliding scale   SubCutaneous Before meals and at bedtime  metoprolol tartrate 25 milliGRAM(s) Oral every 8 hours  predniSONE   Tablet 5 milliGRAM(s) Oral two times a day    MEDICATIONS  (PRN):      Allergies    No Known Allergies    Intolerances        REVIEW OF SYSTEMS:  Negative unless otherwise specified above.    Vital Signs Last 24 Hrs  T(C): 36.5 (2020 11:28), Max: 36.7 (2020 21:46)  T(F): 97.7 (2020 11:28), Max: 98.1 (2020 21:46)  HR: 62 (2020 11:28) (61 - 145)  BP: 111/66 (2020 11:28) (111/66 - 144/105)  BP(mean): --  RR: 17 (2020 11:28) (17 - 18)  SpO2: 98% (2020 11:28) (96% - 98%)        PHYSICAL EXAM:  GENERAL: No apparent distress, appears stated age  HEAD:  Atraumatic, Normocephalic  EYES: Conjunctiva and sclera clear, no discharge  ENMT: Moist mucous membranes, no nasal discharge  NECK: Supple, no JVD  CHEST/LUNG: Clear to auscultation bilaterally, no wheeze or rales  HEART: Regular rhythm, no rubs or gallops  ABDOMEN: Soft, Nontender, Nondistended; Bowel sounds present  EXTREMITIES:  No clubbing, cyanosis or edema  SKIN: No rash or new discoloration  NERVOUS SYSTEM:  Alert & Oriented; Bilateral Lower extremity mobile, sensation to light touch intact      LABS:    2020 06:06    138    |  99     |  17     ----------------------------<  156    3.7     |  23     |  0.70     Ca    8.7        2020 06:06        Urinalysis Basic - ( 2020 19:45 )    Color: Light Yellow / Appearance: Clear / S.017 / pH: x  Gluc: x / Ketone: Trace  / Bili: Negative / Urobili: <2 mg/dL   Blood: x / Protein: Negative / Nitrite: Negative   Leuk Esterase: Negative / RBC: x / WBC x   Sq Epi: x / Non Sq Epi: x / Bacteria: x      CAPILLARY BLOOD GLUCOSE      POCT Blood Glucose.: 227 mg/dL (2020 12:12)  POCT Blood Glucose.: 160 mg/dL (2020 07:54)  POCT Blood Glucose.: 200 mg/dL (2020 21:48)  POCT Blood Glucose.: 138 mg/dL (2020 16:52)      RADIOLOGY & ADDITIONAL TESTS:    Images reviewed personally    Consultant Notes Reviewed and Care Discussed with relevant Consultants/Other Providers.

## 2020-02-24 ENCOUNTER — TRANSCRIPTION ENCOUNTER (OUTPATIENT)
Age: 80
End: 2020-02-24

## 2020-02-24 VITALS
RESPIRATION RATE: 17 BRPM | HEART RATE: 64 BPM | OXYGEN SATURATION: 96 % | TEMPERATURE: 98 F | DIASTOLIC BLOOD PRESSURE: 56 MMHG | SYSTOLIC BLOOD PRESSURE: 103 MMHG

## 2020-02-24 LAB
ANION GAP SERPL CALC-SCNC: 14 MMOL/L — SIGNIFICANT CHANGE UP (ref 5–17)
BUN SERPL-MCNC: 22 MG/DL — SIGNIFICANT CHANGE UP (ref 7–23)
CALCIUM SERPL-MCNC: 8.6 MG/DL — SIGNIFICANT CHANGE UP (ref 8.4–10.5)
CHLORIDE SERPL-SCNC: 102 MMOL/L — SIGNIFICANT CHANGE UP (ref 96–108)
CO2 SERPL-SCNC: 21 MMOL/L — LOW (ref 22–31)
CREAT SERPL-MCNC: 0.78 MG/DL — SIGNIFICANT CHANGE UP (ref 0.5–1.3)
GLUCOSE BLDC GLUCOMTR-MCNC: 224 MG/DL — HIGH (ref 70–99)
GLUCOSE SERPL-MCNC: 161 MG/DL — HIGH (ref 70–99)
POTASSIUM SERPL-MCNC: 3.8 MMOL/L — SIGNIFICANT CHANGE UP (ref 3.5–5.3)
POTASSIUM SERPL-SCNC: 3.8 MMOL/L — SIGNIFICANT CHANGE UP (ref 3.5–5.3)
SODIUM SERPL-SCNC: 137 MMOL/L — SIGNIFICANT CHANGE UP (ref 135–145)
T3 SERPL-MCNC: 60 NG/DL — LOW (ref 80–200)
T4 AB SER-ACNC: 4.6 UG/DL — SIGNIFICANT CHANGE UP (ref 4.6–12)
TSH SERPL-MCNC: 2.17 UIU/ML — SIGNIFICANT CHANGE UP (ref 0.27–4.2)

## 2020-02-24 PROCEDURE — 99239 HOSP IP/OBS DSCHRG MGMT >30: CPT

## 2020-02-24 RX ORDER — PREGABALIN 225 MG/1
1 CAPSULE ORAL
Qty: 30 | Refills: 0
Start: 2020-02-24 | End: 2020-03-24

## 2020-02-24 RX ORDER — AMIODARONE HYDROCHLORIDE 400 MG/1
2 TABLET ORAL
Qty: 16 | Refills: 0
Start: 2020-02-24 | End: 2020-02-27

## 2020-02-24 RX ORDER — AMIODARONE HYDROCHLORIDE 400 MG/1
1 TABLET ORAL
Qty: 30 | Refills: 0
Start: 2020-02-24 | End: 2020-03-24

## 2020-02-24 RX ADMIN — AMIODARONE HYDROCHLORIDE 400 MILLIGRAM(S): 400 TABLET ORAL at 05:32

## 2020-02-24 RX ADMIN — Medication 5 MILLIGRAM(S): at 05:32

## 2020-02-24 RX ADMIN — Medication 25 MILLIGRAM(S): at 05:32

## 2020-02-24 RX ADMIN — PREGABALIN 1000 MICROGRAM(S): 225 CAPSULE ORAL at 12:32

## 2020-02-24 RX ADMIN — Medication 4: at 12:32

## 2020-02-24 RX ADMIN — ENOXAPARIN SODIUM 40 MILLIGRAM(S): 100 INJECTION SUBCUTANEOUS at 12:32

## 2020-02-24 NOTE — PROGRESS NOTE ADULT - ASSESSMENT
met hormone refractory prostate cancer  SVT     SVT management per cardiology.        Zytiga, held  will likely hold Zometa which he is due to receive as outpatient  Lupron as outpatient  he has had marginal control of his disease with Zytiga and it might be time to change therapy particularly with QTc prolongation. Patient was admitted with incidentally found tachycardia, from SVT. He falls on Zytiga for  met hormone refractory prostate cancer  which has been discontinued.  His heart rate and SVTs are controlled on amiodorone  He is getting discharged today  He will follow-up in office  Lupron as outpatient  He has had marginal control of his disease with Zytiga and it might be time to change therapy particularly with QTc prolongation.

## 2020-02-24 NOTE — DISCHARGE NOTE PROVIDER - CARE PROVIDERS DIRECT ADDRESSES
,DirectAddress_Unknown,[email or web address]

## 2020-02-24 NOTE — DISCHARGE NOTE PROVIDER - PROVIDER TOKENS
PROVIDER:[TOKEN:[[id number]],FOLLOWUP:[1 week]],PROVIDER:[TOKEN:[[id number]],FOLLOWUP:[2 weeks]]

## 2020-02-24 NOTE — DISCHARGE NOTE PROVIDER - NSDCCPCAREPLAN_GEN_ALL_CORE_FT
PRINCIPAL DISCHARGE DIAGNOSIS  Diagnosis: SVT (supraventricular tachycardia)  Assessment and Plan of Treatment: You were elvauated by EP doctor  Continue amiodarone 400mg BID for 5gm( Last dose of Amiodarone 400mg in am, 2/28) then decrease to 200mg daily.   Please follow up with EP doctor in 2-3 weeks   Please follow up with your primary care phyiscian in one week      SECONDARY DISCHARGE DIAGNOSES  Diagnosis: SIRS (systemic inflammatory response syndrome)  Assessment and Plan of Treatment: SIRS (systemic inflammatory response syndrome)    Diagnosis: Prostate cancer metastatic to bone  Assessment and Plan of Treatment: Please follow up with your oncologist for further treatment       Diagnosis: Diabetes  Assessment and Plan of Treatment: HgA1C this admission 7.1  Make sure you get your HgA1c checked every three months.  If you take oral diabetes medications, check your blood glucose two times a day.  If you take insulin, check your blood glucose before meals and at bedtime.  It's important not to skip any meals.  Keep a log of your blood glucose results and always take it with you to your doctor appointments.  Keep a list of your current medications including injectables and over the counter medications and bring this medication list with you to all your doctor appointments.  If you have not seen your ophthalmologist this year call for appointment.  Check your feet daily for redness, sores, or openings. Do not self treat. If no improvement in two days call your primary care physician for an appointment.  Low blood sugar (hypoglycemia) is a blood sugar below 70mg/dl. Check your blood sugar if you feel signs/symptoms of hypoglycemia. If your blood sugar is below 70 take 15 grams of carbohydrates (ex 4 oz of apple juice, 3-4 glucose tablets, or 4-6 oz of regular soda) wait 15 minutes and repeat blood sugar to make sure it comes up above 70.  If your blood sugar is above 70 and you are due for a meal, have a meal.  If you are not due for a meal have a snack.  This snack helps keeps your blood sugar at a safe range. PRINCIPAL DISCHARGE DIAGNOSIS  Diagnosis: SVT (supraventricular tachycardia)  Assessment and Plan of Treatment: You were elvauated by EP doctor  Continue amiodarone 400mg BID for 5gm( Last dose of Amiodarone 400mg in am, 2/28) then decrease to 200mg daily.   Please follow up with EP doctor in 2-3 weeks   Please follow up with your primary care phyiscian in one week  Thyroid function test, liver function test, pulmonary function test to be monitoerd as outpatient and please follow up with your ophthamologist         SECONDARY DISCHARGE DIAGNOSES  Diagnosis: SIRS (systemic inflammatory response syndrome)  Assessment and Plan of Treatment: Infectious work up negative  Monitored off antibiotic  No fever no signs of infection noted    Diagnosis: Prostate cancer metastatic to bone  Assessment and Plan of Treatment: Please follow up with your oncologist for further treatment       Diagnosis: Diabetes  Assessment and Plan of Treatment: HgA1C this admission 7.1  Make sure you get your HgA1c checked every three months.  If you take oral diabetes medications, check your blood glucose two times a day.  If you take insulin, check your blood glucose before meals and at bedtime.  It's important not to skip any meals.  Keep a log of your blood glucose results and always take it with you to your doctor appointments.  Keep a list of your current medications including injectables and over the counter medications and bring this medication list with you to all your doctor appointments.  If you have not seen your ophthalmologist this year call for appointment.  Check your feet daily for redness, sores, or openings. Do not self treat. If no improvement in two days call your primary care physician for an appointment.  Low blood sugar (hypoglycemia) is a blood sugar below 70mg/dl. Check your blood sugar if you feel signs/symptoms of hypoglycemia. If your blood sugar is below 70 take 15 grams of carbohydrates (ex 4 oz of apple juice, 3-4 glucose tablets, or 4-6 oz of regular soda) wait 15 minutes and repeat blood sugar to make sure it comes up above 70.  If your blood sugar is above 70 and you are due for a meal, have a meal.  If you are not due for a meal have a snack.  This snack helps keeps your blood sugar at a safe range.

## 2020-02-24 NOTE — PROGRESS NOTE ADULT - PROBLEM SELECTOR PLAN 2
remains afebrile.   Pancultured - no identifiable source for now  monitor off abx as no apparent infection

## 2020-02-24 NOTE — DISCHARGE NOTE PROVIDER - HOSPITAL COURSE
This is a pleasant 80 year old male with PMHx of Type II DM and  metastatic prostate CA who presents w/ SVT.    s/p adenosine in ED. Recurrence of SVT, s/p RRT on 2/21 for SVT to 180's-190 given adenosine 6mg IVP x 2 along w/ Loprressor x 1              25mg in ED. Temp slightly elevated, Febrile 100.6 and tachycardiac, pancultured, BCx negative, Monitored off abx     Evaluated by EP, Amiodarone load started on 2/22, No event for 24 hours. Cleared for discharge by EP.    Pt to complete the Amiodarone load and 200mg daily, follow up with EP in 2-3 weeks. No need for Lopressor as per EP Dr. Lopez    Pt to follow up with PCP and TFT/PFT/LFT/ ophthamology F/U. This is a pleasant 80 year old male with PMHx of Type II DM and  metastatic prostate CA who presents w/ SVT.    s/p adenosine in ED. Recurrence of SVT, s/p RRT on 2/21 for SVT to 180's-190 given adenosine 6mg IVP x 2 along w/ Loprressor x 1              25mg in ED. Temp slightly elevated, Febrile 100.6 and tachycardiac, pancultured, BCx negative, Monitored off abx     Evaluated by EP, Amiodarone load started on 2/22, No event for 24 hours. Cleared for discharge by EP.    Pt to complete the Amiodarone load 5 gm and 200mg daily, follow up with EP in 2-3 weeks. No need for Lopressor as per EP Dr. Lopez    Pt to follow up with PCP and TFT/PFT/LFT/ ophthamology F/U.

## 2020-02-24 NOTE — DISCHARGE NOTE PROVIDER - CARE PROVIDER_API CALL
Jessica Roland)  Hematology; Internal Medicine; Medical Oncology  [address] [facility], [address]  [place] [address]  Phone: [phone]  Fax: [phone]  Follow Up Time: 1 week    Alvaro Ward)  Cardiac Electrophysiology; Cardiology; Internal Medicine  [address] [place] [address]  Phone: [phone]  Follow Up Time: 2 weeks

## 2020-02-24 NOTE — DISCHARGE NOTE PROVIDER - NSDCMRMEDTOKEN_GEN_ALL_CORE_FT
Lupron: dose(s) subcutaneous prn  metFORMIN 500 mg oral tablet: 1 tab(s) orally 2 times a day  predniSONE 5 mg oral tablet: 1 tab(s) orally 2 times a day  Zometa 4 mg intravenous injection: dose(s) intravenous prn  Zytiga: dose(s) orally once a day amiodarone 200 mg oral tablet: 2 tab(s) orally every 12 hours ( Last dose in am on 2/28, then take 200mg daily )  amiodarone 200 mg oral tablet: 1 tab(s) orally once a day   cyanocobalamin 1000 mcg oral tablet: 1 tab(s) orally once a day  Lupron: dose(s) subcutaneous prn  metFORMIN 500 mg oral tablet: 1 tab(s) orally 2 times a day  predniSONE 5 mg oral tablet: 1 tab(s) orally 2 times a day  Zometa 4 mg intravenous injection: dose(s) intravenous prn

## 2020-02-24 NOTE — PROGRESS NOTE ADULT - SUBJECTIVE AND OBJECTIVE BOX
HPI:  Dr. Carlos Luevano DO  Attending Physician  Division of Hospital Medicine  [facility]  Pager:  [phone]    History obtained from ED, patient, and his daughter at bedside.    This is a pleasant 80 year old male with PMHx of Type II DM and  metastatic prostate CA who presents because of HR in the 180s which was incidentally found by pulse oximetry by patient's daughter. Patient asymptomatic. No chest pain, dyspnea, palpitations, dizziness, syncope. No fever, chills, nausea, cough, abd pain, headache, or rash. No blood in stool. This is the first time this has happened. No family history of heart disease. Does not drink or take drugs. He reports being a little thirsty. (20 Feb 2020 16:50)  met hormone refractory prostate cancer, on Lupron, Zometa and Zytiga  admitted with SVT  prolonged QTc yesterday so zytiga held and amio started    PAST MEDICAL & SURGICAL HISTORY:  Diabetes  Prostate cancer: metastatic.  No significant past surgical history    Medications:  aMIOdarone    Tablet 400 milliGRAM(s) Oral every 12 hours  cyanocobalamin 1000 MICROGram(s) Oral daily  enoxaparin Injectable 40 milliGRAM(s) SubCutaneous daily  insulin lispro (HumaLOG) corrective regimen sliding scale   SubCutaneous Before meals and at bedtime  metoprolol tartrate 25 milliGRAM(s) Oral every 8 hours  predniSONE   Tablet 5 milliGRAM(s) Oral two times a day    Labs:    02-24    137  |  102  |  22  ----------------------------<  161<H>  3.8   |  21<L>  |  0.78    Ca    8.6      24 Feb 2020 06:13        Radiology:             ROS:  Patient comfortable without distress  No SOB or chest pain  No palpitation  No abdominal pain, diarrhaea or constipation  No weakness of extremities  No skin changes or swelling of legs    Vital Signs Last 24 Hrs  T(C): 36.5 (24 Feb 2020 04:00), Max: 36.8 (23 Feb 2020 21:12)  T(F): 97.7 (24 Feb 2020 04:00), Max: 98.2 (23 Feb 2020 21:12)  HR: 64 (24 Feb 2020 05:33) (59 - 67)  BP: 112/66 (24 Feb 2020 04:00) (98/62 - 130/68)  BP(mean): --  RR: 18 (24 Feb 2020 04:00) (17 - 18)  SpO2: 99% (24 Feb 2020 04:00) (96% - 99%)    Physical exam:  Patient alert and oriented  No distress  CVS: S1, S2 regular or murmur  Chest: bilateral breath sound without rales  Abdomen: soft, not tender, no organomegaly or masses  No focal neuro deficit  No edema      Assessment and Plan: HPI:    This is a pleasant 80 year old male with PMHx of Type II DM and  metastatic prostate CA who was admitted because of HR in the 180s which was incidentally found by pulse oximetry by patient's daughter. Patient asymptomatic. No chest pain, dyspnea, palpitations, dizziness, syncope. No fever, chills, nausea, cough, abd pain, headache, or rash. No blood in stool. This is the first time this has happened. No family history of heart disease. Does not drink or take drugs. He reports being a little thirsty. (20 Feb 2020 16:50)  Patient has met hormone refractory prostate cancer, and was on Lupron, Zometa and Zytiga  admitted with SVT  prolonged QTc  so zytiga held and amio started    PAST MEDICAL & SURGICAL HISTORY:  Diabetes  Prostate cancer: metastatic.  No significant past surgical history    Medications:  aMIOdarone    Tablet 400 milliGRAM(s) Oral every 12 hours  cyanocobalamin 1000 MICROGram(s) Oral daily  enoxaparin Injectable 40 milliGRAM(s) SubCutaneous daily  insulin lispro (HumaLOG) corrective regimen sliding scale   SubCutaneous Before meals and at bedtime  metoprolol tartrate 25 milliGRAM(s) Oral every 8 hours  predniSONE   Tablet 5 milliGRAM(s) Oral two times a day    Labs:    02-24    137  |  102  |  22  ----------------------------<  161<H>  3.8   |  21<L>  |  0.78    Ca    8.6      24 Feb 2020 06:13        Radiology:             ROS:  Patient comfortable without distress  No SOB or chest pain  No palpitation, feels good and is walking around  No abdominal pain, diarrhaea or constipation  No weakness of extremities  No skin changes or swelling of legs    Vital Signs Last 24 Hrs  T(C): 36.5 (24 Feb 2020 04:00), Max: 36.8 (23 Feb 2020 21:12)  T(F): 97.7 (24 Feb 2020 04:00), Max: 98.2 (23 Feb 2020 21:12)  HR: 64 (24 Feb 2020 05:33) (59 - 67)  BP: 112/66 (24 Feb 2020 04:00) (98/62 - 130/68)  BP(mean): --  RR: 18 (24 Feb 2020 04:00) (17 - 18)  SpO2: 99% (24 Feb 2020 04:00) (96% - 99%)    Physical exam:  Patient alert and oriented  No distress  CVS: S1, S2 regular or murmur  Chest: bilateral breath sound without rales  Abdomen: soft, not tender, no organomegaly or masses  No focal neuro deficit  No edema      Assessment and Plan:

## 2020-02-24 NOTE — DISCHARGE NOTE NURSING/CASE MANAGEMENT/SOCIAL WORK - PATIENT PORTAL LINK FT
You can access the FollowMyHealth Patient Portal offered by [facility] by registering at the following website: [email or web address]. By joining [facility]’s FollowMyHealth portal, you will also be able to view your health information using other applications (apps) compatible with our system.

## 2020-02-24 NOTE — PROGRESS NOTE ADULT - ASSESSMENT
For all Cardiology service contact information, go to [email or web address] and use "[employer]" to login.    SUBJECTIVE:   Denies CP, SOB or Dyspnea.   Telemetry reviewed.   -------------------------------------------------------------------------------------------  ROS:  CV: chest pain (-), palpitation (-) PULM: SOB (-), cough (-), wheezing (-), hemoptysis (-). CONST: fever (-), chills (-) or fatigue (-). HEENT:Visual changes (-); vertigo or throat pain (-);  neck stiffness (-). GI: abdominal pain (-) , nausea/vomiting (-), hematemesis, (-)melena (-), hematochezia (-). : dysuria (-), frequency (-), hematuria (-). NEURO: numbness (-), weakness (-). SKIN: itching (-), rash (-)    All other review of systems is negative unless indicated above.   -------------------------------------------------------------------------------------------  VS:  T(F): 97.7 (02-24), Max: 98.2 (02-23)  HR: 64 (02-24) (59 - 67)  BP: 112/66 (02-24) (98/62 - 130/68)  RR: 18 (02-24)  SpO2: 99% (02-24)  I&O's Summary    23 Feb 2020 07:01  -  24 Feb 2020 07:00  --------------------------------------------------------  IN: 1380 mL / OUT: 0 mL / NET: 1380 mL      -------------------------------------------------------------------------------------------  EXAM:   GEN: No acute distress; well appearing.  CV:   PULM:  HEENT: PERRL, EOMI, No scleral icterus. Normal mucosa.  GI: soft, non-tender, non-distended.   MSK: No joint deformity.  NEURO: Non-focal.  LYMPH: No lymphadenopathy.  PSY: Mood & affect appropriate.  SKIN: No rashes, ecchymoses, or cyanosis.  -------------------------------------------------------------------------------------------  LABS:      02-24    137  |  102  |  22  ----------------------------<  161<H>  3.8   |  21<L>  |  0.78    Ca    8.6      24 Feb 2020 06:13                      -------------------------------------------------------------------------------------------  Meds:  aMIOdarone    Tablet 400 milliGRAM(s) Oral every 12 hours  cyanocobalamin 1000 MICROGram(s) Oral daily  enoxaparin Injectable 40 milliGRAM(s) SubCutaneous daily  insulin lispro (HumaLOG) corrective regimen sliding scale   SubCutaneous Before meals and at bedtime  metoprolol tartrate 25 milliGRAM(s) Oral every 8 hours  predniSONE   Tablet 5 milliGRAM(s) Oral two times a day    -------------------------------------------------------------------------------------------  Telemetry reviewed. New ECG in chart reviewed. New Echocardiogram in chart reviewed.  New Stress Testing in chart reviewed. New Cardiac Catheterization in chart chart reviewed. New imaging reviewed.     -------------------------------------------------------------------------------------------  ASSESSMENT AND PLAN:     79y/o male h/o metastatic prostrate Ca on antiandrogen therapy, DM p/w SVT that terminated w/ adenosine. Had recurrent SVT (tele strips demonstrate AT) that went from 2:1 to 1:1 conduction.     Plan:   Continue amiodarone 400mg BID for 5gm then decrease to 200mg daily.   Baseline TFT/LFTs WNL.   Will need outpatient monitoring of TFT/PFT/LFT/ ophthamology F/U.     We will sign off at this time. Please call the EP service phone if there are any further questions.     Addison Lopez  Cardiology Fellow   [phone]     For all Cardiology service contact information, go to [email or web address] and use "[employer]" to login. For all Cardiology service contact information, go to [email or web address] and use "[email or web address]" to login.    SUBJECTIVE:   Denies CP, SOB or Dyspnea.   Telemetry reviewed.   -------------------------------------------------------------------------------------------  ROS:  CV: chest pain (-), palpitation (-) PULM: SOB (-), cough (-), wheezing (-), hemoptysis (-). CONST: fever (-), chills (-) or fatigue (-). HEENT:Visual changes (-); vertigo or throat pain (-);  neck stiffness (-). GI: abdominal pain (-) , nausea/vomiting (-), hematemesis, (-)melena (-), hematochezia (-). : dysuria (-), frequency (-), hematuria (-). NEURO: numbness (-), weakness (-). SKIN: itching (-), rash (-)    All other review of systems is negative unless indicated above.   -------------------------------------------------------------------------------------------  VS:  T(F): 97.7 (02-24), Max: 98.2 (02-23)  HR: 64 (02-24) (59 - 67)  BP: 112/66 (02-24) (98/62 - 130/68)  RR: 18 (02-24)  SpO2: 99% (02-24)  I&O's Summary    23 Feb 2020 07:01  -  24 Feb 2020 07:00  --------------------------------------------------------  IN: 1380 mL / OUT: 0 mL / NET: 1380 mL      -------------------------------------------------------------------------------------------  EXAM:   GEN: No acute distress; well appearing.  CV:   PULM:  HEENT: PERRL, EOMI, No scleral icterus. Normal mucosa.  GI: soft, non-tender, non-distended.   MSK: No joint deformity.  NEURO: Non-focal.  LYMPH: No lymphadenopathy.  PSY: Mood & affect appropriate.  SKIN: No rashes, ecchymoses, or cyanosis.  -------------------------------------------------------------------------------------------  LABS:      02-24    137  |  102  |  22  ----------------------------<  161<H>  3.8   |  21<L>  |  0.78    Ca    8.6      24 Feb 2020 06:13                      -------------------------------------------------------------------------------------------  Meds:  aMIOdarone    Tablet 400 milliGRAM(s) Oral every 12 hours  cyanocobalamin 1000 MICROGram(s) Oral daily  enoxaparin Injectable 40 milliGRAM(s) SubCutaneous daily  insulin lispro (HumaLOG) corrective regimen sliding scale   SubCutaneous Before meals and at bedtime  metoprolol tartrate 25 milliGRAM(s) Oral every 8 hours  predniSONE   Tablet 5 milliGRAM(s) Oral two times a day    -------------------------------------------------------------------------------------------  Telemetry reviewed. New ECG in chart reviewed. New Echocardiogram in chart reviewed.  New Stress Testing in chart reviewed. New Cardiac Catheterization in chart chart reviewed. New imaging reviewed.     -------------------------------------------------------------------------------------------  ASSESSMENT AND PLAN:     79y/o male h/o metastatic prostrate Ca on antiandrogen therapy, DM p/w SVT that terminated w/ adenosine. Had recurrent SVT (tele strips demonstrate AT) that went from 2:1 to 1:1 conduction, now controlled on amiodarone.     Plan:   Continue amiodarone 400mg BID for 5gm then decrease to 200mg daily.   Discontinue metoprolol   Baseline TFT/LFTs WNL, will need outpatient monitoring of TFT/PFT/LFT/ ophthamology F/U.   Follow up in clinic within 2-3 weeks.     We will sign off at this time. Please call the EP service phone if there are any further questions.     Addison Lopez  Cardiology Fellow   [phone]     For all Cardiology service contact information, go to [email or web address] and use "[email or web address]" to login.

## 2020-02-24 NOTE — PROGRESS NOTE ADULT - PROBLEM SELECTOR PLAN 1
AVNRT vs A-Tach  no events on tele further.   Amio loading on going then to daily dose.   No additional BBlocker on d/c as per EP  Hold Zytiga due to prolonged QTc

## 2020-02-24 NOTE — PROGRESS NOTE ADULT - PROBLEM SELECTOR PLAN 6
Transitions of Care Status:  1.  Name of PCP: Dr. Ardon  2.  PCP Contacted on Admission: [ ] Y    [ x] N   3.  PCP contacted at Discharge: [X] Y    [ ] N    [ ] N/A  4.  Post-Discharge Appointment Date and Location:   5.  Summary of Handoff given to PCP: yes.

## 2020-02-24 NOTE — PROGRESS NOTE ADULT - SUBJECTIVE AND OBJECTIVE BOX
[facility] Division of Hospital Medicine  Roselia Mott MD  Pager ([facility], 6A-0B): [phone]  Other Times:  [phone]    Patient is a 80y old  Male who presents with a chief complaint of SVT (24 Feb 2020 12:41)      SUBJECTIVE / OVERNIGHT EVENTS:  feeling well. denies any complaints. ambulated yesterday without difficulty  tele no ectopy.     ADDITIONAL REVIEW OF SYSTEMS:    MEDICATIONS  (STANDING):  aMIOdarone    Tablet 400 milliGRAM(s) Oral every 12 hours  cyanocobalamin 1000 MICROGram(s) Oral daily  enoxaparin Injectable 40 milliGRAM(s) SubCutaneous daily  insulin lispro (HumaLOG) corrective regimen sliding scale   SubCutaneous Before meals and at bedtime  metoprolol tartrate 25 milliGRAM(s) Oral every 8 hours  predniSONE   Tablet 5 milliGRAM(s) Oral two times a day    MEDICATIONS  (PRN):      CAPILLARY BLOOD GLUCOSE      POCT Blood Glucose.: 224 mg/dL (24 Feb 2020 11:47)  POCT Blood Glucose.: 145 mg/dL (24 Feb 2020 08:00)  POCT Blood Glucose.: 202 mg/dL (23 Feb 2020 21:54)  POCT Blood Glucose.: 125 mg/dL (23 Feb 2020 16:46)    I&O's Summary    23 Feb 2020 07:01  -  24 Feb 2020 07:00  --------------------------------------------------------  IN: 1380 mL / OUT: 0 mL / NET: 1380 mL    24 Feb 2020 07:01  -  24 Feb 2020 16:09  --------------------------------------------------------  IN: 780 mL / OUT: 0 mL / NET: 780 mL        PHYSICAL EXAM:  Vital Signs Last 24 Hrs  T(C): 36.9 (24 Feb 2020 11:17), Max: 36.9 (24 Feb 2020 11:17)  T(F): 98.4 (24 Feb 2020 11:17), Max: 98.4 (24 Feb 2020 11:17)  HR: 64 (24 Feb 2020 11:17) (59 - 67)  BP: 103/56 (24 Feb 2020 11:17) (103/56 - 130/68)  BP(mean): --  RR: 17 (24 Feb 2020 11:17) (17 - 18)  SpO2: 96% (24 Feb 2020 11:17) (96% - 99%)    CONSTITUTIONAL: NAD, well-groomed  EYES:  conjunctiva and sclera clear  ENMT: Moist oral mucosa  NECK: Supple, no palpable masses; no JVD  RESPIRATORY: Normal respiratory effort; lungs are clear to auscultation bilaterally  CARDIOVASCULAR: Regular rate and rhythm, normal S1 and S2, no murmur/rub/gallop; No lower extremity edema  ABDOMEN: Nontender to palpation, normoactive bowel sounds, no rebound/guarding  MUSCULOSKELETAL:  no clubbing or cyanosis of digits; no joint swelling or tenderness to palpation  PSYCH: A+O to person, place, and time; affect appropriate  SKIN: No rashes; no palpable lesions    LABS:    02-24    137  |  102  |  22  ----------------------------<  161<H>  3.8   |  21<L>  |  0.78    Ca    8.6      24 Feb 2020 06:13                Culture - Blood (collected 21 Feb 2020 16:44)  Source: .Blood Blood-Peripheral  Preliminary Report (22 Feb 2020 17:01):    No growth to date.    Culture - Blood (collected 21 Feb 2020 16:43)  Source: .Blood Blood-Peripheral  Preliminary Report (22 Feb 2020 17:01):    No growth to date.        RADIOLOGY & ADDITIONAL TESTS:  Results Reviewed:   Imaging Personally Reviewed:  Electrocardiogram Personally Reviewed:    COORDINATION OF CARE:  Care Discussed with Consultants/Other Providers [Y/N]:  Prior or Outpatient Records Reviewed [Y/N]:

## 2020-02-26 LAB
CULTURE RESULTS: SIGNIFICANT CHANGE UP
CULTURE RESULTS: SIGNIFICANT CHANGE UP
SPECIMEN SOURCE: SIGNIFICANT CHANGE UP
SPECIMEN SOURCE: SIGNIFICANT CHANGE UP

## 2020-03-03 ENCOUNTER — EMERGENCY (EMERGENCY)
Facility: HOSPITAL | Age: 80
LOS: 1 days | Discharge: HOME | End: 2020-03-03
Attending: EMERGENCY MEDICINE | Admitting: EMERGENCY MEDICINE
Payer: MEDICARE

## 2020-03-03 VITALS
OXYGEN SATURATION: 100 % | HEART RATE: 77 BPM | RESPIRATION RATE: 17 BRPM | HEIGHT: 67 IN | TEMPERATURE: 98 F | SYSTOLIC BLOOD PRESSURE: 144 MMHG | WEIGHT: 134.92 LBS | DIASTOLIC BLOOD PRESSURE: 85 MMHG

## 2020-03-03 VITALS
SYSTOLIC BLOOD PRESSURE: 135 MMHG | TEMPERATURE: 98 F | HEART RATE: 80 BPM | DIASTOLIC BLOOD PRESSURE: 78 MMHG | RESPIRATION RATE: 18 BRPM | OXYGEN SATURATION: 99 %

## 2020-03-03 PROCEDURE — 76377 3D RENDER W/INTRP POSTPROCES: CPT | Mod: 26

## 2020-03-03 PROCEDURE — 76377 3D RENDER W/INTRP POSTPROCES: CPT

## 2020-03-03 PROCEDURE — 70486 CT MAXILLOFACIAL W/O DYE: CPT | Mod: 26

## 2020-03-03 PROCEDURE — 12051 INTMD RPR FACE/MM 2.5 CM/<: CPT

## 2020-03-03 PROCEDURE — 99284 EMERGENCY DEPT VISIT MOD MDM: CPT | Mod: 25

## 2020-03-03 PROCEDURE — 90471 IMMUNIZATION ADMIN: CPT

## 2020-03-03 PROCEDURE — 70486 CT MAXILLOFACIAL W/O DYE: CPT

## 2020-03-03 PROCEDURE — 70450 CT HEAD/BRAIN W/O DYE: CPT

## 2020-03-03 PROCEDURE — 70450 CT HEAD/BRAIN W/O DYE: CPT | Mod: 26

## 2020-03-03 RX ORDER — TETANUS TOXOID, REDUCED DIPHTHERIA TOXOID AND ACELLULAR PERTUSSIS VACCINE, ADSORBED 5; 2.5; 8; 8; 2.5 [IU]/.5ML; [IU]/.5ML; UG/.5ML; UG/.5ML; UG/.5ML
0.5 SUSPENSION INTRAMUSCULAR ONCE
Refills: 0 | Status: DISCONTINUED | OUTPATIENT
Start: 2020-03-03 | End: 2020-03-03

## 2020-03-03 RX ORDER — TETANUS TOXOID, REDUCED DIPHTHERIA TOXOID AND ACELLULAR PERTUSSIS VACCINE, ADSORBED 5; 2.5; 8; 8; 2.5 [IU]/.5ML; [IU]/.5ML; UG/.5ML; UG/.5ML; UG/.5ML
0.5 SUSPENSION INTRAMUSCULAR ONCE
Refills: 0 | Status: COMPLETED | OUTPATIENT
Start: 2020-03-03 | End: 2020-03-03

## 2020-03-03 RX ADMIN — TETANUS TOXOID, REDUCED DIPHTHERIA TOXOID AND ACELLULAR PERTUSSIS VACCINE, ADSORBED 0.5 MILLILITER(S): 5; 2.5; 8; 8; 2.5 SUSPENSION INTRAMUSCULAR at 19:28

## 2020-03-03 NOTE — ED PROVIDER NOTE - CARE PLAN
Principal Discharge DX:	Facial laceration, initial encounter  Secondary Diagnosis:	Abrasion of face, initial encounter  Secondary Diagnosis:	Fall, initial encounter

## 2020-03-03 NOTE — ED PROVIDER NOTE - PHYSICAL EXAMINATION
GEN: Pt in NAD, A&O x3. GCS 15.   EYES: Faint ecchymosis/non-blanchable erythema of L periorbital region and frontal region, sclera white w/o injection PERRLA, EOMI w/o pain.  ENT: Nares without deformity, no DC. No auricular tenderness, no ear DC. no Drew's sign. No dental trauma. Neck supple FROM. No midline or paraspinal tenderness. Trachea midline.   RESP: CTA b/l, no wheezes, rales, or rhonchi.   CARDIAC: RRR clear distinct S1, S2.   ABDOMEN: Abdomen soft, non-tender. No CVAT b/l.   VASC: 2+ carotid, radial and dorsalis pulses b/l.   MSK: No joint erythema or obvious deformities. Spine without obvious deformity. No midline or paraspinal tenderness. FROM w/o pain of UE and LE b/l.   NEURO: CN2-12 intact. Normal and equal sensation UE, LE and face b/l. 5/5 strength upper and lower extremity b/l. Pronator drift negative. Steady gait.  SKIN: 0.5cm abrasion superior to L eyebrow, no active bleeding. 1.5cm laceration L zygomatic area with 1.5cm abrasion inferiorly, no active bleeding, small amount of gravel noted around the area.

## 2020-03-03 NOTE — ED PROVIDER NOTE - ATTENDING CONTRIBUTION TO CARE
ground level fall / pred 5mg wo ac or asa/ mechanical  L facial abr, minor, no other traumatic findings on exam.  repair lac. ct head

## 2020-03-03 NOTE — ED PROVIDER NOTE - CLINICAL SUMMARY MEDICAL DECISION MAKING FREE TEXT BOX
79 y/o M metastatic prostate CA on steroids with mechanical trip and fall head trauma no LOC. Pt appears well, has facial laceration otherwise non-focal exam. Plan for CTH, CT max/face to evaluate for fx or acute bleed. Reassess, lac repair, local wound care, likely DC home.

## 2020-03-03 NOTE — ED PROVIDER NOTE - RAPID ASSESSMENT
Zach Saldaña rapid assessment documentation for Felipe Forrester):    80 year old male with no pmhx presents to the ED c/o laceration to left cheek s/p witnessed trip and fall today. Pt stepped out of car to wash hand then tripped while walking on the road. Denies LOC. Denies CP, dizziness, vomiting, visual changes, confusion, neck pain, dysuria. Not currently on blood thinners. Unknown tetanus shot status.

## 2020-03-03 NOTE — ED ADULT NURSE NOTE - CHPI ED NUR SYMPTOMS NEG
no vomiting/no tingling/no dizziness/no nausea/no weakness/no fever/no chills/no decreased eating/drinking

## 2020-03-03 NOTE — ED PROVIDER NOTE - NSFOLLOWUPINSTRUCTIONS_ED_ALL_ED_FT
Follow up with your primary medical doctor in 1-2 day.    Bring all printed results to discuss.    Keep sutures dry for 24 hours then clean gently with soap and warm water.      Apply bacitracin ointment twice a day for 5 days.      Return to the ER in 5 days for suture removal.      Return to the ER for any persistent/worsening symptoms or if you experience any new symptoms such as fever, redness, numbness , you notice a new rash, increased irritation, abnormal discharge, severe headaches, confusion, numbness, tingling, weakness, difficulty speaking, walking, swallowing, or any concerning symptoms.

## 2020-03-03 NOTE — ED PROVIDER NOTE - PROGRESS NOTE DETAILS
pt feeling well, images reviewed with pt, laceration repaired, pt ready and stable for DC. -Jeyson Grigsby PA-C

## 2020-03-03 NOTE — ED ADULT NURSE NOTE - NSIMPLEMENTINTERV_GEN_ALL_ED
Implemented All Universal Safety Interventions:  [place] to call system. Call bell, personal items and telephone within reach. Instruct patient to call for assistance. Room bathroom lighting operational. Non-slip footwear when patient is off stretcher. Physically safe environment: no spills, clutter or unnecessary equipment. Stretcher in lowest position, wheels locked, appropriate side rails in place.

## 2020-03-03 NOTE — ED PROVIDER NOTE - PATIENT PORTAL LINK FT
You can access the FollowMyHealth Patient Portal offered by [facility] by registering at the following website: [email or web address]. By joining [facility]’s FollowMyHealth portal, you will also be able to view your health information using other applications (apps) compatible with our system.

## 2020-03-03 NOTE — ED PROVIDER NOTE - OBJECTIVE STATEMENT
81 y/o M with PMHx of metastatic prostate CA, on prednisone, presents to the ED c/o laceration to left cheek s/p witnessed trip and fall today. Pt stepped out of car to wash hands, tripped while walking on road and hit head/face. Denies LOC. Denies HA, CP, dizziness, vomiting, visual changes, confusion, neck or back pain, numbness, paresthesias, weakness, difficulty speaking/swallowing/walking, n/v. Pt denies AC use. Unknown tetanus shot status.

## 2020-03-04 ENCOUNTER — APPOINTMENT (OUTPATIENT)
Dept: INTERNAL MEDICINE | Facility: CLINIC | Age: 80
End: 2020-03-04
Payer: MEDICARE

## 2020-03-04 ENCOUNTER — NON-APPOINTMENT (OUTPATIENT)
Age: 80
End: 2020-03-04

## 2020-03-04 VITALS
HEART RATE: 75 BPM | WEIGHT: 135 LBS | OXYGEN SATURATION: 98 % | SYSTOLIC BLOOD PRESSURE: 140 MMHG | HEIGHT: 68 IN | BODY MASS INDEX: 20.46 KG/M2 | DIASTOLIC BLOOD PRESSURE: 80 MMHG | TEMPERATURE: 97.7 F | RESPIRATION RATE: 17 BRPM

## 2020-03-04 DIAGNOSIS — B01.9 ZOSTER W/OUT COMPLICATIONS: ICD-10-CM

## 2020-03-04 DIAGNOSIS — Z09 ENCOUNTER FOR FOLLOW-UP EXAMINATION AFTER COMPLETED TREATMENT FOR CONDITIONS OTHER THAN MALIGNANT NEOPLASM: ICD-10-CM

## 2020-03-04 DIAGNOSIS — B02.9 ZOSTER W/OUT COMPLICATIONS: ICD-10-CM

## 2020-03-04 DIAGNOSIS — R55 SYNCOPE AND COLLAPSE: ICD-10-CM

## 2020-03-04 DIAGNOSIS — I47.1 SUPRAVENTRICULAR TACHYCARDIA: ICD-10-CM

## 2020-03-04 PROCEDURE — 93000 ELECTROCARDIOGRAM COMPLETE: CPT | Mod: PD

## 2020-03-04 PROCEDURE — 99495 TRANSJ CARE MGMT MOD F2F 14D: CPT | Mod: 25,PD

## 2020-03-04 NOTE — HISTORY OF PRESENT ILLNESS
[Post-hospitalization from ___ Hospital] : Post-hospitalization from [unfilled] Hospital [Admitted on: ___] : The patient was admitted on [unfilled] [Discharged on ___] : discharged on [unfilled] [Discharge Summary] : discharge summary [Pertinent Labs] : pertinent labs [Discharge Med List] : discharge medication list [Med Reconciliation] : medication reconciliation has been completed [Patient Contacted By: ____] : and contacted by [unfilled] [FreeTextEntry2] : 80-year-old male with a history of metastatic prostate cancer and diabetes who was admitted to [facility] with SVT. He was treated with adenosine x2 doses. He was then given a dose of Lopressor. The SVT recurred and he was started on amiodarone. He was discharged on amiodarone 400 mg b.i.d. and then decrease to 200 mg daily.\par \par Since discharge he developed a rash on his left arm. He was seen by his oncologist who started him on acyclovir for shingles. Yesterday while walking he fell. He was with his daughter but it is unclear if he tripped and fell. He denies any loss of consciousness. He hit his face but did not injure any other part of his body. He went to the emergency room and had a CAT scan which was fine.\par \par HbA1c in the hospital was 7.1

## 2020-03-04 NOTE — REVIEW OF SYSTEMS
[Itching] : itching [Skin Rash] : skin rash [Unsteady Walk] : ataxia [Negative] : Heme/Lymph [Dizziness] : no dizziness [Fainting] : no fainting [FreeTextEntry5] : as noted in HPI  [de-identified] : as noted in HPI

## 2020-03-04 NOTE — PLAN
[FreeTextEntry1] : It is concerning that he lost consciousness and fell rather than having a mechanical fall. He did not protect his face at all. His EKG today is fine. I advised him and his daughter that they should see the EP Doctor. sooner than their scheduled  appointment in 2 weeks. The daughter is concerned about being amiodarone. I did advise her that they should address that with the EP doctor.\par He had his labs checked by his oncologist. Gynecologist did check a thyroid function test as well as a liver function test. They are awaiting the results.\par Acyclovir as prescribed for shingles\par He is going to return next week to have the stitches removed\par

## 2020-03-06 ENCOUNTER — INPATIENT (INPATIENT)
Facility: HOSPITAL | Age: 80
LOS: 0 days | Discharge: HOME | DRG: 310 | End: 2020-03-07
Attending: INTERNAL MEDICINE | Admitting: INTERNAL MEDICINE
Payer: COMMERCIAL

## 2020-03-06 VITALS
HEIGHT: 67 IN | RESPIRATION RATE: 18 BRPM | WEIGHT: 134.92 LBS | HEART RATE: 150 BPM | SYSTOLIC BLOOD PRESSURE: 127 MMHG | TEMPERATURE: 97 F | DIASTOLIC BLOOD PRESSURE: 80 MMHG | OXYGEN SATURATION: 97 %

## 2020-03-06 LAB
HCT VFR BLD CALC: 39.8 % — SIGNIFICANT CHANGE UP (ref 39–50)
HGB BLD-MCNC: 12.7 G/DL — LOW (ref 13–17)
MCHC RBC-ENTMCNC: 31.8 PG — SIGNIFICANT CHANGE UP (ref 27–34)
MCHC RBC-ENTMCNC: 31.9 GM/DL — LOW (ref 32–36)
MCV RBC AUTO: 99.5 FL — SIGNIFICANT CHANGE UP (ref 80–100)
NRBC # BLD: 0 /100 WBCS — SIGNIFICANT CHANGE UP (ref 0–0)
PLATELET # BLD AUTO: 247 K/UL — SIGNIFICANT CHANGE UP (ref 150–400)
RBC # BLD: 4 M/UL — LOW (ref 4.2–5.8)
RBC # FLD: 13.5 % — SIGNIFICANT CHANGE UP (ref 10.3–14.5)
WBC # BLD: 6.57 K/UL — SIGNIFICANT CHANGE UP (ref 3.8–10.5)
WBC # FLD AUTO: 6.57 K/UL — SIGNIFICANT CHANGE UP (ref 3.8–10.5)

## 2020-03-06 PROCEDURE — 99285 EMERGENCY DEPT VISIT HI MDM: CPT | Mod: GC

## 2020-03-06 PROCEDURE — 93010 ELECTROCARDIOGRAM REPORT: CPT | Mod: GC

## 2020-03-06 RX ORDER — SODIUM CHLORIDE 9 MG/ML
1000 INJECTION, SOLUTION INTRAVENOUS ONCE
Refills: 0 | Status: COMPLETED | OUTPATIENT
Start: 2020-03-06 | End: 2020-03-06

## 2020-03-06 RX ORDER — ASPIRIN/CALCIUM CARB/MAGNESIUM 324 MG
162 TABLET ORAL ONCE
Refills: 0 | Status: COMPLETED | OUTPATIENT
Start: 2020-03-06 | End: 2020-03-06

## 2020-03-06 RX ADMIN — Medication 162 MILLIGRAM(S): at 23:34

## 2020-03-06 RX ADMIN — SODIUM CHLORIDE 1000 MILLILITER(S): 9 INJECTION, SOLUTION INTRAVENOUS at 23:34

## 2020-03-06 NOTE — ED PROVIDER NOTE - PHYSICAL EXAMINATION
Attending Kerline England: Gen: NAD, heent: atrauamtic, eomi, perrla, mmm, op pink, uvula midline, neck; nttp, no nuchal rigidity, chest: nttp, no crepitus, cv: tachycardic, no murmurs, lungs: ctab, abd: soft, nontender, nondistended, no peritoneal signs, +BS, no guarding, ext: wwp, neg homans, skin: no rash, neuro: awake and alert, following commands, speech clear, sensation and strength intact, no focal deficits Attending Kerline England: Gen: NAD, heent: atrauamtic, eomi, perrla, mmm, op pink, uvula midline, neck; nttp, no nuchal rigidity, chest: nttp, no crepitus, cv: tachycardic, no murmurs, lungs: ctab, abd: soft, nontender, nondistended, no peritoneal signs, +BS, no guarding, ext: wwp, neg homans, skin: vesicular rash, neuro: awake and alert, following commands, speech clear, sensation and strength intact, no focal deficits

## 2020-03-06 NOTE — ED PROVIDER NOTE - PROGRESS NOTE DETAILS
Resident: Rashad Grant - Pt with repeat EKG several hours ago that showed progressive NC prolongation and then dropped beat. Continued to monitor on tele, called by tele tech pt now with no NC lengthening and dropped beats. Called cardiology and placed pt on pads. Resident: Rashad Grant - Pts HR as low as 100s as high as 140s on tele, just returned from CTA do not see large PE on scan after read will admit to Tele. Spoke with pts daughter who is a nephrologist clarified that pt is DNR/DNI but that they would consider PPM if recommended by EP, EP saw pt and said will review but likely no intervention until monday. PT had another MR at last visit was DCed 2/24 admitted under Roselia Mott. Other MR is [id number]

## 2020-03-06 NOTE — ED ADULT NURSE NOTE - NSIMPLEMENTINTERV_GEN_ALL_ED
Implemented All Fall Risk Interventions:  [place] to call system. Call bell, personal items and telephone within reach. Instruct patient to call for assistance. Room bathroom lighting operational. Non-slip footwear when patient is off stretcher. Physically safe environment: no spills, clutter or unnecessary equipment. Stretcher in lowest position, wheels locked, appropriate side rails in place. Provide visual cue, wrist band, yellow gown, etc. Monitor gait and stability. Monitor for mental status changes and reorient to person, place, and time. Review medications for side effects contributing to fall risk. Reinforce activity limits and safety measures with patient and family.

## 2020-03-06 NOTE — ED ADULT NURSE NOTE - ALCOHOL PRE SCREEN (AUDIT - C)
Swelling that continues/Persistent Nausea and Vomiting/Bleeding that does not stop/Numbness, tingling/Unable to Urinate/Pain not relieved by Medications
Statement Selected

## 2020-03-06 NOTE — ED PROVIDER NOTE - ATTENDING CONTRIBUTION TO CARE
Attending MD Kerline England:  I personally have seen and examined this patient.  Resident note reviewed and agree on plan of care and except where noted.  See HPI, PE, and MDM for details.

## 2020-03-06 NOTE — ED PROVIDER NOTE - OBJECTIVE STATEMENT
80 Y M with hx of metastatic prostate Ca, 15 days ago was admitted for SVT required adenosine x2 over 24 hours, was eventually discharged on amiodarone. Daughter says that today he has been fine all week taking his PO amio but seemed tired so daughter listed with a to his heart and thought it was fast and brought him in. Pt states that he feels like his heart is racing, denies chest pain, nausea, vomiting, LOC, SOB. Denies fever or cough. He does have shingles that was diagnosed last week, started valtrex yesterday. He has an appointment with Dr. Wrad in 2 weeks and he says that is who took care of him here a few weeks ago.

## 2020-03-06 NOTE — ED PROVIDER NOTE - CLINICAL SUMMARY MEDICAL DECISION MAKING FREE TEXT BOX
80 y m had hx of SVT her with rapid HR in 130s mild hypoxia on arrival, normotensive with normal exam. EKG appears more like sinus tach in 130s rather than SVT without rectal temp, feel that PE is possible given mild hypoxia and hx of prostate CA. Will get labs including BNP and trop, as well as CXR. Will get CTA for pe. Pt will likely need re-admission. 80 y m had hx of SVT her with rapid HR in 130s mild hypoxia on arrival, normotensive with normal exam. EKG appears more like sinus tach in 130s rather than SVT without rectal temp, feel that PE is possible given mild hypoxia and hx of prostate CA. Will get labs including BNP and trop, as well as CXR. Will get CTA for pe. Pt will likely need re-admission.  Attending Kerline England: 79 y/o male with h/o metastatic prostate cancer presenting with tachycardia and mild chest pain. upon arrival pt found to be sig tachycardic. concern for possible atrial tachcyardia. afebrile in the ed without evidence of infectious etiology. with sig tachcyardic and reports of chest pain and known metastatic cancer cta performed to further evaluate. pt given IVF with some improvement in rate. will likely need IV rate control and admission for EP eval. BP stable

## 2020-03-06 NOTE — ED ADULT NURSE NOTE - OBJECTIVE STATEMENT
80M to ED c/o palpitations and chest pressure. Patient diagnosed with SVT two weeks ago and is taking amiodarone. Patient also diagnosed with shingles and started valtrex yesterday. On arrival to ED patient denies chest pain, SOB, difficulty breathing. Patient denies taking blood thinners. Patient is alert and oriented x4, calm/cooperative. Patient has 18 ga to L wrist and 20 to RAC placed in ED. 80M to ED c/o palpitations and chest pressure. Patient diagnosed with SVT two weeks ago and is taking amiodarone. Patient also diagnosed with shingles and started valtrex yesterday. On arrival to ED patient denies chest pain, SOB, difficulty breathing, nausea, vomiting. Patient denies taking blood thinners. Patient is alert and oriented x4, calm/cooperative. Patient has 18 ga to L wrist and 20 to RAC placed in ED. Pt is in Sinus Tach on heart monitor on arrival to ED HR up to 140 on arrival to [facility].

## 2020-03-07 ENCOUNTER — TRANSCRIPTION ENCOUNTER (OUTPATIENT)
Age: 80
End: 2020-03-07

## 2020-03-07 VITALS
DIASTOLIC BLOOD PRESSURE: 67 MMHG | SYSTOLIC BLOOD PRESSURE: 105 MMHG | RESPIRATION RATE: 17 BRPM | HEART RATE: 90 BPM | TEMPERATURE: 98 F | OXYGEN SATURATION: 100 %

## 2020-03-07 DIAGNOSIS — Z02.9 ENCOUNTER FOR ADMINISTRATIVE EXAMINATIONS, UNSPECIFIED: ICD-10-CM

## 2020-03-07 DIAGNOSIS — I47.1 SUPRAVENTRICULAR TACHYCARDIA: ICD-10-CM

## 2020-03-07 DIAGNOSIS — E11.9 TYPE 2 DIABETES MELLITUS WITHOUT COMPLICATIONS: ICD-10-CM

## 2020-03-07 DIAGNOSIS — C61 MALIGNANT NEOPLASM OF PROSTATE: ICD-10-CM

## 2020-03-07 DIAGNOSIS — B02.9 ZOSTER WITHOUT COMPLICATIONS: ICD-10-CM

## 2020-03-07 DIAGNOSIS — Z29.9 ENCOUNTER FOR PROPHYLACTIC MEASURES, UNSPECIFIED: ICD-10-CM

## 2020-03-07 DIAGNOSIS — I45.9 CONDUCTION DISORDER, UNSPECIFIED: ICD-10-CM

## 2020-03-07 DIAGNOSIS — R00.0 TACHYCARDIA, UNSPECIFIED: ICD-10-CM

## 2020-03-07 LAB
ALBUMIN SERPL ELPH-MCNC: 4.2 G/DL — SIGNIFICANT CHANGE UP (ref 3.3–5)
ALP SERPL-CCNC: 126 U/L — HIGH (ref 40–120)
ALT FLD-CCNC: 13 U/L — SIGNIFICANT CHANGE UP (ref 10–45)
ANION GAP SERPL CALC-SCNC: 14 MMOL/L — SIGNIFICANT CHANGE UP (ref 5–17)
ANION GAP SERPL CALC-SCNC: 15 MMOL/L — SIGNIFICANT CHANGE UP (ref 5–17)
APPEARANCE UR: CLEAR — SIGNIFICANT CHANGE UP
AST SERPL-CCNC: 16 U/L — SIGNIFICANT CHANGE UP (ref 10–40)
BACTERIA # UR AUTO: NEGATIVE — SIGNIFICANT CHANGE UP
BILIRUB SERPL-MCNC: 0.4 MG/DL — SIGNIFICANT CHANGE UP (ref 0.2–1.2)
BILIRUB UR-MCNC: NEGATIVE — SIGNIFICANT CHANGE UP
BUN SERPL-MCNC: 11 MG/DL — SIGNIFICANT CHANGE UP (ref 7–23)
BUN SERPL-MCNC: 15 MG/DL — SIGNIFICANT CHANGE UP (ref 7–23)
CALCIUM SERPL-MCNC: 8.5 MG/DL — SIGNIFICANT CHANGE UP (ref 8.4–10.5)
CALCIUM SERPL-MCNC: 9 MG/DL — SIGNIFICANT CHANGE UP (ref 8.4–10.5)
CHLORIDE SERPL-SCNC: 102 MMOL/L — SIGNIFICANT CHANGE UP (ref 96–108)
CHLORIDE SERPL-SCNC: 97 MMOL/L — SIGNIFICANT CHANGE UP (ref 96–108)
CO2 SERPL-SCNC: 23 MMOL/L — SIGNIFICANT CHANGE UP (ref 22–31)
CO2 SERPL-SCNC: 23 MMOL/L — SIGNIFICANT CHANGE UP (ref 22–31)
COLOR SPEC: COLORLESS — SIGNIFICANT CHANGE UP
CREAT SERPL-MCNC: 0.73 MG/DL — SIGNIFICANT CHANGE UP (ref 0.5–1.3)
CREAT SERPL-MCNC: 0.92 MG/DL — SIGNIFICANT CHANGE UP (ref 0.5–1.3)
DIFF PNL FLD: NEGATIVE — SIGNIFICANT CHANGE UP
EPI CELLS # UR: 0 — SIGNIFICANT CHANGE UP
GLUCOSE SERPL-MCNC: 115 MG/DL — HIGH (ref 70–99)
GLUCOSE SERPL-MCNC: 207 MG/DL — HIGH (ref 70–99)
GLUCOSE UR QL: NEGATIVE — SIGNIFICANT CHANGE UP
HCT VFR BLD CALC: 37.4 % — LOW (ref 39–50)
HGB BLD-MCNC: 12.4 G/DL — LOW (ref 13–17)
HYALINE CASTS # UR AUTO: 0 /LPF — SIGNIFICANT CHANGE UP (ref 0–7)
KETONES UR-MCNC: NEGATIVE — SIGNIFICANT CHANGE UP
LEUKOCYTE ESTERASE UR-ACNC: NEGATIVE — SIGNIFICANT CHANGE UP
MAGNESIUM SERPL-MCNC: 2 MG/DL — SIGNIFICANT CHANGE UP (ref 1.6–2.6)
MAGNESIUM SERPL-MCNC: 2.2 MG/DL — SIGNIFICANT CHANGE UP (ref 1.6–2.6)
MCHC RBC-ENTMCNC: 32.5 PG — SIGNIFICANT CHANGE UP (ref 27–34)
MCHC RBC-ENTMCNC: 33.2 GM/DL — SIGNIFICANT CHANGE UP (ref 32–36)
MCV RBC AUTO: 97.9 FL — SIGNIFICANT CHANGE UP (ref 80–100)
NITRITE UR-MCNC: NEGATIVE — SIGNIFICANT CHANGE UP
NRBC # BLD: 0 /100 WBCS — SIGNIFICANT CHANGE UP (ref 0–0)
NT-PROBNP SERPL-SCNC: 177 PG/ML — SIGNIFICANT CHANGE UP (ref 0–300)
PH UR: 7 — SIGNIFICANT CHANGE UP (ref 5–8)
PHOSPHATE SERPL-MCNC: 3.7 MG/DL — SIGNIFICANT CHANGE UP (ref 2.5–4.5)
PHOSPHATE SERPL-MCNC: 4 MG/DL — SIGNIFICANT CHANGE UP (ref 2.5–4.5)
PLATELET # BLD AUTO: 241 K/UL — SIGNIFICANT CHANGE UP (ref 150–400)
POTASSIUM SERPL-MCNC: 3.5 MMOL/L — SIGNIFICANT CHANGE UP (ref 3.5–5.3)
POTASSIUM SERPL-MCNC: 4.1 MMOL/L — SIGNIFICANT CHANGE UP (ref 3.5–5.3)
POTASSIUM SERPL-SCNC: 3.5 MMOL/L — SIGNIFICANT CHANGE UP (ref 3.5–5.3)
POTASSIUM SERPL-SCNC: 4.1 MMOL/L — SIGNIFICANT CHANGE UP (ref 3.5–5.3)
PROT SERPL-MCNC: 7.1 G/DL — SIGNIFICANT CHANGE UP (ref 6–8.3)
PROT UR-MCNC: NEGATIVE — SIGNIFICANT CHANGE UP
RBC # BLD: 3.82 M/UL — LOW (ref 4.2–5.8)
RBC # FLD: 13.6 % — SIGNIFICANT CHANGE UP (ref 10.3–14.5)
RBC CASTS # UR COMP ASSIST: 0 /HPF — SIGNIFICANT CHANGE UP (ref 0–4)
SODIUM SERPL-SCNC: 135 MMOL/L — SIGNIFICANT CHANGE UP (ref 135–145)
SODIUM SERPL-SCNC: 139 MMOL/L — SIGNIFICANT CHANGE UP (ref 135–145)
SP GR SPEC: 1 — LOW (ref 1.01–1.02)
TROPONIN T, HIGH SENSITIVITY RESULT: 12 NG/L — SIGNIFICANT CHANGE UP (ref 0–51)
TROPONIN T, HIGH SENSITIVITY RESULT: 13 NG/L — SIGNIFICANT CHANGE UP (ref 0–51)
TSH SERPL-MCNC: 3.03 UIU/ML — SIGNIFICANT CHANGE UP (ref 0.27–4.2)
UROBILINOGEN FLD QL: NEGATIVE — SIGNIFICANT CHANGE UP
WBC # BLD: 5.22 K/UL — SIGNIFICANT CHANGE UP (ref 3.8–10.5)
WBC # FLD AUTO: 5.22 K/UL — SIGNIFICANT CHANGE UP (ref 3.8–10.5)
WBC UR QL: 0 /HPF — SIGNIFICANT CHANGE UP (ref 0–5)

## 2020-03-07 PROCEDURE — 93308 TTE F-UP OR LMTD: CPT

## 2020-03-07 PROCEDURE — 99285 EMERGENCY DEPT VISIT HI MDM: CPT | Mod: 25

## 2020-03-07 PROCEDURE — 84484 ASSAY OF TROPONIN QUANT: CPT

## 2020-03-07 PROCEDURE — 93308 TTE F-UP OR LMTD: CPT | Mod: 26

## 2020-03-07 PROCEDURE — 84100 ASSAY OF PHOSPHORUS: CPT

## 2020-03-07 PROCEDURE — 82962 GLUCOSE BLOOD TEST: CPT

## 2020-03-07 PROCEDURE — 85027 COMPLETE CBC AUTOMATED: CPT

## 2020-03-07 PROCEDURE — 83735 ASSAY OF MAGNESIUM: CPT

## 2020-03-07 PROCEDURE — 84443 ASSAY THYROID STIM HORMONE: CPT

## 2020-03-07 PROCEDURE — 99223 1ST HOSP IP/OBS HIGH 75: CPT | Mod: GC

## 2020-03-07 PROCEDURE — 71045 X-RAY EXAM CHEST 1 VIEW: CPT

## 2020-03-07 PROCEDURE — 71275 CT ANGIOGRAPHY CHEST: CPT

## 2020-03-07 PROCEDURE — 71275 CT ANGIOGRAPHY CHEST: CPT | Mod: 26

## 2020-03-07 PROCEDURE — 81001 URINALYSIS AUTO W/SCOPE: CPT

## 2020-03-07 PROCEDURE — 80053 COMPREHEN METABOLIC PANEL: CPT

## 2020-03-07 PROCEDURE — 93005 ELECTROCARDIOGRAM TRACING: CPT

## 2020-03-07 PROCEDURE — 96360 HYDRATION IV INFUSION INIT: CPT | Mod: XU

## 2020-03-07 PROCEDURE — 83880 ASSAY OF NATRIURETIC PEPTIDE: CPT

## 2020-03-07 PROCEDURE — 87086 URINE CULTURE/COLONY COUNT: CPT

## 2020-03-07 PROCEDURE — 80048 BASIC METABOLIC PNL TOTAL CA: CPT

## 2020-03-07 PROCEDURE — 12345: CPT | Mod: NC,GC

## 2020-03-07 PROCEDURE — 71045 X-RAY EXAM CHEST 1 VIEW: CPT | Mod: 26

## 2020-03-07 RX ORDER — ENOXAPARIN SODIUM 100 MG/ML
40 INJECTION SUBCUTANEOUS DAILY
Refills: 0 | Status: DISCONTINUED | OUTPATIENT
Start: 2020-03-07 | End: 2020-03-07

## 2020-03-07 RX ORDER — GLUCAGON INJECTION, SOLUTION 0.5 MG/.1ML
1 INJECTION, SOLUTION SUBCUTANEOUS ONCE
Refills: 0 | Status: DISCONTINUED | OUTPATIENT
Start: 2020-03-07 | End: 2020-03-07

## 2020-03-07 RX ORDER — VALACYCLOVIR 500 MG/1
1000 TABLET, FILM COATED ORAL THREE TIMES A DAY
Refills: 0 | Status: DISCONTINUED | OUTPATIENT
Start: 2020-03-07 | End: 2020-03-07

## 2020-03-07 RX ORDER — INSULIN LISPRO 100/ML
VIAL (ML) SUBCUTANEOUS AT BEDTIME
Refills: 0 | Status: DISCONTINUED | OUTPATIENT
Start: 2020-03-07 | End: 2020-03-07

## 2020-03-07 RX ORDER — CALCIUM CARBONATE 500(1250)
1 TABLET ORAL DAILY
Refills: 0 | Status: DISCONTINUED | OUTPATIENT
Start: 2020-03-07 | End: 2020-03-07

## 2020-03-07 RX ORDER — DEXTROSE 50 % IN WATER 50 %
25 SYRINGE (ML) INTRAVENOUS ONCE
Refills: 0 | Status: DISCONTINUED | OUTPATIENT
Start: 2020-03-07 | End: 2020-03-07

## 2020-03-07 RX ORDER — SODIUM CHLORIDE 9 MG/ML
1000 INJECTION, SOLUTION INTRAVENOUS
Refills: 0 | Status: DISCONTINUED | OUTPATIENT
Start: 2020-03-07 | End: 2020-03-07

## 2020-03-07 RX ORDER — POTASSIUM CHLORIDE 20 MEQ
40 PACKET (EA) ORAL ONCE
Refills: 0 | Status: COMPLETED | OUTPATIENT
Start: 2020-03-07 | End: 2020-03-07

## 2020-03-07 RX ORDER — AMIODARONE HYDROCHLORIDE 400 MG/1
200 TABLET ORAL DAILY
Refills: 0 | Status: DISCONTINUED | OUTPATIENT
Start: 2020-03-07 | End: 2020-03-07

## 2020-03-07 RX ORDER — DEXTROSE 50 % IN WATER 50 %
15 SYRINGE (ML) INTRAVENOUS ONCE
Refills: 0 | Status: DISCONTINUED | OUTPATIENT
Start: 2020-03-07 | End: 2020-03-07

## 2020-03-07 RX ORDER — CHOLECALCIFEROL (VITAMIN D3) 125 MCG
1000 CAPSULE ORAL DAILY
Refills: 0 | Status: DISCONTINUED | OUTPATIENT
Start: 2020-03-07 | End: 2020-03-07

## 2020-03-07 RX ORDER — INSULIN LISPRO 100/ML
VIAL (ML) SUBCUTANEOUS
Refills: 0 | Status: DISCONTINUED | OUTPATIENT
Start: 2020-03-07 | End: 2020-03-07

## 2020-03-07 RX ORDER — AMIODARONE HYDROCHLORIDE 400 MG/1
1 TABLET ORAL
Qty: 0 | Refills: 0 | DISCHARGE

## 2020-03-07 RX ORDER — DEXTROSE 50 % IN WATER 50 %
12.5 SYRINGE (ML) INTRAVENOUS ONCE
Refills: 0 | Status: DISCONTINUED | OUTPATIENT
Start: 2020-03-07 | End: 2020-03-07

## 2020-03-07 RX ADMIN — ENOXAPARIN SODIUM 40 MILLIGRAM(S): 100 INJECTION SUBCUTANEOUS at 13:45

## 2020-03-07 RX ADMIN — Medication 1000 UNIT(S): at 13:46

## 2020-03-07 RX ADMIN — Medication 1 TABLET(S): at 15:58

## 2020-03-07 RX ADMIN — SODIUM CHLORIDE 1000 MILLILITER(S): 9 INJECTION, SOLUTION INTRAVENOUS at 01:00

## 2020-03-07 RX ADMIN — VALACYCLOVIR 1000 MILLIGRAM(S): 500 TABLET, FILM COATED ORAL at 13:46

## 2020-03-07 RX ADMIN — AMIODARONE HYDROCHLORIDE 200 MILLIGRAM(S): 400 TABLET ORAL at 15:58

## 2020-03-07 RX ADMIN — Medication 40 MILLIEQUIVALENT(S): at 13:46

## 2020-03-07 NOTE — H&P ADULT - NSHPLABSRESULTS_GEN_ALL_CORE
LABS:                          12.4   5.22  )-----------( 241      ( 07 Mar 2020 06:55 )             37.4     Hb Trend: 12.4<--, 12.7<--  WBC Trend: 5.22<--, 6.57<--  Plt Trend: 241<--, 247<--          03-06    135  |  97  |  15  ----------------------------<  207<H>  4.1   |  23  |  0.92    Ca    9.0      06 Mar 2020 23:34  Phos  4.0     03-06  Mg     2.2     -06    TPro  7.1  /  Alb  4.2  /  TBili  0.4  /  DBili  x   /  AST  16  /  ALT  13  /  AlkPhos  126<H>  03-06        Urinalysis Basic - ( 07 Mar 2020 01:14 )    Color: Colorless / Appearance: Clear / S.005 / pH: x  Gluc: x / Ketone: Negative  / Bili: Negative / Urobili: Negative   Blood: x / Protein: Negative / Nitrite: Negative   Leuk Esterase: Negative / RBC: 0 /hpf / WBC 0 /HPF   Sq Epi: x / Non Sq Epi: 0 / Bacteria: Negative      CAPILLARY BLOOD GLUCOSE              IMAGING:  < from: Xray Chest 1 View AP/PA (20 @ 00:52) >    EXAM:  XR CHEST AP OR PA 1V                        PROCEDURE DATE:  2020    ******PRELIMINARY REPORT******    ******PRELIMINARY REPORT******      INTERPRETATION:  clear lungs. diffuse osseous metastatic disease      EXAM:  CT ANGIO CHEST (W)AW IC                       PROCEDURE DATE:  2020    IMPRESSION:     1.No pulmonary embolism.  2. Diffuse osseous metastatic disease.      EXAM:  ER TTE LIMITED   IMPRESSION:   No Pericardial Effusion.    EKG: Sinus Tachycardia with second degree type 1 block,  bpm Personally reviewed labs and noted in detail below:  no leukocytosis no hypokalemia no hypomagnesia  LABS:                          12.4   5.22  )-----------( 241      ( 07 Mar 2020 06:55 )             37.4     Hb Trend: 12.4<--, 12.7<--  WBC Trend: 5.22<--, 6.57<--  Plt Trend: 241<--, 247<--          03-06    135  |  97  |  15  ----------------------------<  207<H>  4.1   |  23  |  0.92    Ca    9.0      06 Mar 2020 23:34  Phos  4.0     -  Mg     2.2     -06    TPro  7.1  /  Alb  4.2  /  TBili  0.4  /  DBili  x   /  AST  16  /  ALT  13  /  AlkPhos  126<H>  03        Urinalysis Basic - ( 07 Mar 2020 01:14 )    Color: Colorless / Appearance: Clear / S.005 / pH: x  Gluc: x / Ketone: Negative  / Bili: Negative / Urobili: Negative   Blood: x / Protein: Negative / Nitrite: Negative   Leuk Esterase: Negative / RBC: 0 /hpf / WBC 0 /HPF   Sq Epi: x / Non Sq Epi: 0 / Bacteria: Negative      CAPILLARY BLOOD GLUCOSE          IMAGING:  < from: Xray Chest 1 View AP/PA (20 @ 00:52) >    EXAM:  XR CHEST AP OR PA 1V                        PROCEDURE DATE:  2020    ******PRELIMINARY REPORT******    ******PRELIMINARY REPORT******      INTERPRETATION:  clear lungs. diffuse osseous metastatic disease      EXAM:  CT ANGIO CHEST (W)AW IC                       PROCEDURE DATE:  2020    IMPRESSION:     1.No pulmonary embolism.  2. Diffuse osseous metastatic disease.      EXAM:  ER TTE LIMITED   IMPRESSION:   No Pericardial Effusion.    Personally EKG: Sinus Tachycardia 136 bpm Repeat EKG ST 111bp with 2nd degree Type 1 with PVCs

## 2020-03-07 NOTE — PROGRESS NOTE ADULT - SUBJECTIVE AND OBJECTIVE BOX
INTERVAL HPI/OVERNIGHT EVENTS:    SUBJECTIVE: Patient seen and examined at bedside. No overnight events. Patient denies cp/n/v/c/d.     ROS neg except as above    OBJECTIVE:    VITAL SIGNS:  ICU Vital Signs Last 24 Hrs  T(C): 36.1 (07 Mar 2020 02:58), Max: 37 (06 Mar 2020 23:10)  T(F): 96.9 (07 Mar 2020 02:58), Max: 98.6 (06 Mar 2020 23:10)  HR: 144 (07 Mar 2020 11:34) (99 - 150)  BP: 100/84 (07 Mar 2020 11:34) (100/84 - 142/106)  BP(mean): --  ABP: --  ABP(mean): --  RR: 19 (07 Mar 2020 11:34) (18 - 24)  SpO2: 97% (07 Mar 2020 11:34) (94% - 99%)        CAPILLARY BLOOD GLUCOSE      POCT Blood Glucose.: 104 mg/dL (07 Mar 2020 11:20)      PHYSICAL EXAM:    	General: NAD  	Head: Normocephalic, Atraumatic  	Eyes: PERRLA, EOMI, normal sclera: periorbital ecchymoses of the left eye  	Throat: Moist mucous membranes  	Respiratory: CTAB, normal respiratory effort, no wheezes, crackles, rales  	CV: tachycardic, regular rhythm, S1/S2, no murmurs, rubs or gallops  	Abdominal: Soft, bowel sounds present, NT, ND  	Extremities: No edema, 2+ DP pulses  	Neurological: A&Ox4, MAEx4, non-focal  Skin: periorbital ecchymosis of left eye, dried vesicular rash on LUE,    MEDICATIONS:  MEDICATIONS  (STANDING):  calcium carbonate   1250 mG (OsCal) 1 Tablet(s) Oral daily  cholecalciferol 1000 Unit(s) Oral daily  dextrose 5%. 1000 milliLiter(s) (50 mL/Hr) IV Continuous <Continuous>  dextrose 50% Injectable 12.5 Gram(s) IV Push once  dextrose 50% Injectable 25 Gram(s) IV Push once  dextrose 50% Injectable 25 Gram(s) IV Push once  enoxaparin Injectable 40 milliGRAM(s) SubCutaneous daily  insulin lispro (HumaLOG) corrective regimen sliding scale   SubCutaneous three times a day before meals  insulin lispro (HumaLOG) corrective regimen sliding scale   SubCutaneous at bedtime  potassium chloride    Tablet ER 40 milliEquivalent(s) Oral once  predniSONE   Tablet 5 milliGRAM(s) Oral two times a day  valACYclovir 1000 milliGRAM(s) Oral three times a day    MEDICATIONS  (PRN):  dextrose 40% Gel 15 Gram(s) Oral once PRN Blood Glucose LESS THAN 70 milliGRAM(s)/deciliter  glucagon  Injectable 1 milliGRAM(s) IntraMuscular once PRN Glucose LESS THAN 70 milligrams/deciliter      ALLERGIES:  Allergies    No Known Allergies    Intolerances        LABS:                        12.4   5.22  )-----------( 241      ( 07 Mar 2020 06:55 )             37.4     03-07    139  |  102  |  11  ----------------------------<  115<H>  3.5   |  23  |  0.73    Ca    8.5      07 Mar 2020 06:55  Phos  3.7     03-07  Mg     2.0     03-07    TPro  7.1  /  Alb  4.2  /  TBili  0.4  /  DBili  x   /  AST  16  /  ALT  13  /  AlkPhos  126<H>  03-06      Urinalysis Basic - ( 07 Mar 2020 01:14 )    Color: Colorless / Appearance: Clear / S.005 / pH: x  Gluc: x / Ketone: Negative  / Bili: Negative / Urobili: Negative   Blood: x / Protein: Negative / Nitrite: Negative   Leuk Esterase: Negative / RBC: 0 /hpf / WBC 0 /HPF   Sq Epi: x / Non Sq Epi: 0 / Bacteria: Negative        RADIOLOGY & ADDITIONAL TESTS: Reviewed.

## 2020-03-07 NOTE — ED ADULT NURSE REASSESSMENT NOTE - NS ED NURSE REASSESS COMMENT FT1
As per [facility] room call, patient is in 2nd degree HB type 2. Dr. Grant made aware and at bedside to update family to plan of care. Repeat EKG at this time being performed.
Report received from [facility]. [facility], no complaints at this time. Will continue to monitor.
Pt received from [facility] in [place], alert and oriented times three, breathing spontaneous, unlabored without distress on room air, NAD, CM sinus tachy, denies chest pain, NORA, palpitation, admitted to TELE for heart block, awaits EP consult and bed

## 2020-03-07 NOTE — DISCHARGE NOTE PROVIDER - HOSPITAL COURSE
HPI:    80 year old male with hx of metastatic prostate Ca and T2DM who was recently admitted for SVT presents with tachycardia. The patient was admitted from 2/20-2/24 for an SVT. He was unsuccessfully treated with adenosine and lopressor without return to NSR. He was started on a 5 gram amiodarone load and discharged on amiodarone 200 mg daily. He did well as after discharged. He fell on Tuesday after slipping in a parking lot. He suffered a bruise to his eye. He developed shingles of his left arm and was prescribed valtrex. On Thursday night at approximately 10 pm, his daughter noticed that he appeared lethargic. She listened to his heart with a stethoscope and noticed that his heart was racing. She brought him to the ED. The patient denied having any symptoms of dizziness, palpitations, chest pain or SOB. He was unaware that his heart was racing.        Hospital Course:    Patient was monitored overnight, noted to be in atrial tachycardia with Mobitz Type I on tele, likely physiologic in setting of atach. Evaluated by EP, restarted on home amiodarone and cleared for discharge with close outpatient followup. Patient is hemodynamically stable and medically cleared for discharge.

## 2020-03-07 NOTE — H&P ADULT - NSHPPHYSICALEXAM_GEN_ALL_CORE
General: Well-nourished, well-developed, NAD  Head: Normocephalic, Atraumatic  Eyes: PERRLA, EOMI, normal sclera  Throat: Moist mucous membranes  Respiratory: CTAB, normal respiratory effort, no wheezes, crackles, rales  CV: RRR, S1/S2, no murmurs, rubs or gallops  Abdominal: Soft, bowel sounds present, NT, ND +BS  Extremities: No edema, 2+ DP pulses  Neurological: A&Ox4, MAEx4, non-focal  Skin: No rashes    General: NAD  Head: Normocephalic, Atraumatic  Eyes: PERRLA, EOMI, normal sclera  Throat: Moist mucous membranes  Respiratory: CTAB, normal respiratory effort, no wheezes, crackles, rales  CV: tachycardic, regular rhythm, S1/S2, no murmurs, rubs or gallops  Abdominal: Soft, bowel sounds present, NT, ND  Extremities: No edema, 2+ DP pulses  Neurological: A&Ox4, MAEx4, non-focal  Skin: No rashes, periorbital ecchymosis of left eye, vesicular rash on LUE Vital Signs Last 24 Hrs  T(C): 36.1 (07 Mar 2020 02:58), Max: 37 (06 Mar 2020 23:10)  T(F): 96.9 (07 Mar 2020 02:58), Max: 98.6 (06 Mar 2020 23:10)  HR: 99 (07 Mar 2020 06:31) (99 - 150)  BP: 112/57 (07 Mar 2020 06:31) (109/84 - 142/106)  BP(mean): --  RR: 20 (07 Mar 2020 06:31) (18 - 24)  SpO2: 97% (07 Mar 2020 06:31) (94% - 97%)    General: NAD  Head: Normocephalic, Atraumatic  Eyes: PERRLA, EOMI, normal sclera  Throat: Moist mucous membranes  Respiratory: CTAB, normal respiratory effort, no wheezes, crackles, rales  CV: tachycardic, regular rhythm, S1/S2, no murmurs, rubs or gallops  Abdominal: Soft, bowel sounds present, NT, ND  Extremities: No edema, 2+ DP pulses  Neurological: A&Ox4, MAEx4, non-focal  Skin: No rashes, periorbital ecchymosis of left eye, vesicular rash on LUE Vital Signs Last 24 Hrs  T(C): 36.1 (07 Mar 2020 02:58), Max: 37 (06 Mar 2020 23:10)  T(F): 96.9 (07 Mar 2020 02:58), Max: 98.6 (06 Mar 2020 23:10)  HR: 99 (07 Mar 2020 06:31) (99 - 150)  BP: 112/57 (07 Mar 2020 06:31) (109/84 - 142/106)  BP(mean): --  RR: 20 (07 Mar 2020 06:31) (18 - 24)  SpO2: 97% (07 Mar 2020 06:31) (94% - 97%)    General: NAD  Head: Normocephalic, Atraumatic  Eyes: PERRLA, EOMI, normal sclera: periorbital ecchymoses of the left eye  Throat: Moist mucous membranes  Respiratory: CTAB, normal respiratory effort, no wheezes, crackles, rales  CV: tachycardic, regular rhythm, S1/S2, no murmurs, rubs or gallops  Abdominal: Soft, bowel sounds present, NT, ND  Extremities: No edema, 2+ DP pulses  Neurological: A&Ox4, MAEx4, non-focal  Skin: periorbital ecchymosis of left eye, dried vesicular rash on LUE,

## 2020-03-07 NOTE — DISCHARGE NOTE NURSING/CASE MANAGEMENT/SOCIAL WORK - PATIENT PORTAL LINK FT
You can access the FollowMyHealth Patient Portal offered by [facility] by registering at the following website: [email or web address]. By joining [facility]’s FollowMyHealth portal, you will also be able to view your health information using other applications (apps) compatible with our system.

## 2020-03-07 NOTE — DISCHARGE NOTE PROVIDER - CARE PROVIDER_API CALL
Salomón Ortiz (MD)  Cardiac Electrophysiology; Cardiovascular Disease; Internal Medicine  [address] [address] [place] [address], [address] [place] [address]  Phone: [phone]  Fax: [phone]  Follow Up Time:

## 2020-03-07 NOTE — H&P ADULT - HISTORY OF PRESENT ILLNESS
80 year old male with hx of metastatic prostate Ca and T2DM who was recently admitted for SVT presents with tachycardia. The patient was admitted from 2/20-2/24 for an SVT. He was unsuccessfully treated with adenosine and lopressor without return to NSR. He was started on a 5 gram amiodarone load and discharged on amiodarone 200 mg daily. He did well as after discharged. He fell on Tuesday after slipping in a parking lot. He suffered a bruise to his eye. He developed shingles of his left arm and was prescribed valtrex. On Thursday night at approximately 10 pm, his daughter noticed that he appeared lethargic. She listened to his heart with a stethoscope and noticed that his heart was racing. She brought him to the ED. The patient denied having any symptoms of dizziness, palpitations, chest pain or SOB. He was unaware that his heart was racing.    In the ED, vital signs were T96.9F,  -144 bpm, and -125/68-94, RR 20-21, 94-97% RA.  In the ED, he had episodes of dropped beats so there was concern for Mobitz Type II vs non-conducted PACs.  He received  mg x1 and 1L LR bolus x1.

## 2020-03-07 NOTE — H&P ADULT - ASSESSMENT
80 year old male with hx of metastatic prostate Ca and T2DM who was recently admitted for SVT presents with recurrent tachycardia with dropped beats, concern for Mobitz Type II vs nonconducted PACs, admitted for further management. 80 year old male with hx of metastatic prostate Ca and T2DM who was recently admitted for SVT presents with recurrent tachycardia with dropped beats, concern for Mobitz Type II vs Mobitz Type I vs non-conducted PACs, admitted for further management.

## 2020-03-07 NOTE — DISCHARGE NOTE PROVIDER - NSDCMRMEDTOKEN_GEN_ALL_CORE_FT
amiodarone 200 mg oral tablet: 1 tab(s) orally once a day  calcium carbonate 1250 mg (500 mg elemental calcium) oral tablet: 1 tab(s) orally once a day  cholecalciferol oral tablet: 1000 unit(s) orally once a day  metFORMIN 500 mg oral tablet: 1 tab(s) orally 2 times a day  predniSONE 5 mg oral tablet: 1 tab(s) orally 2 times a day  Valtrex 500 mg oral tablet: 1 tab(s) orally 3 times a day  Vitamin B-12:

## 2020-03-07 NOTE — DISCHARGE NOTE PROVIDER - NSDCCPCAREPLAN_GEN_ALL_CORE_FT
PRINCIPAL DISCHARGE DIAGNOSIS  Diagnosis: Tachycardia  Assessment and Plan of Treatment: You were admitted with a fast heart rate. You were evaluated by the EP team, who noted you have atrial tachycardia. Your home amiodarone was restarted and you are cleared for discharge home. Please make sure to follow up with the EP doctors and your PCP

## 2020-03-07 NOTE — CONSULT NOTE ADULT - SUBJECTIVE AND OBJECTIVE BOX
Patient seen and evaluated at bedside    Chief Complaint: palpitations    HPI:  Mr Parra is an 81 yo male with a hx of known metastatic prostate cancer (DNR) with a recent admission for SVT.  On that admission required adenosine x 2 and discharged on amiodarone.  Daughter reported med compliance however he appeared tired, daughter listened to his heart and found him to be tachycardic.  He reported ongoing palpitations.  He was also recently diagnosed with shingles, put on valtrex yesterday.    In ED he had episodes of Wenckebach and then had some dropped beats.  There was concern for Mobitz Type II and so patient being admitted to [facility] for observation.    PMHx:   Prostate cancer metastatic to bone  No pertinent past medical history  Diabetes mellitus  No pertinent past medical history    PSHx:   No significant past surgical history    Allergies:  No Known Allergies    Home Meds:  bicalutamide 50 mg oral tablet: 1 tab(s) orally once a day (23 Dec 2015 10:21)  calcium carbonate 1250 mg (500 mg elemental calcium) oral tablet: 1 tab(s) orally once a day (23 Dec 2015 10:21)  cholecalciferol oral tablet: 1000 unit(s) orally once a day (23 Dec 2015 10:21)  docusate sodium 100 mg oral capsule: 1 cap(s) orally 2 times a day (23 Dec 2015 10:21)  polyethylene glycol 3350 oral powder for reconstitution: 17 gram(s) orally once a day (23 Dec 2015 10:21)  tamsulosin 0.4 mg oral capsule: 1 cap(s) orally once a day (at bedtime) (23 Dec 2015 10:21)    Current Medications: see chart    FAMILY HISTORY:  No pertinent family history in first degree relatives    Social History:  denies active smoking    REVIEW OF SYSTEMS:  CONSTITUTIONAL: + weak  EYES/ENT: No visual changes;  No dysphagia  NECK: No pain or stiffness  RESPIRATORY: see hpi  CARDIOVASCULAR: see hpi  GASTROINTESTINAL: No abdominal or epigastric pain.  BACK: No back pain  GENITOURINARY: No dysuria, frequency or hematuria  NEUROLOGICAL: No numbness or weakness  SKIN: rash on left arm  All other review of systems is negative unless indicated above.    Physical Exam:  T(F): 96.9 (03-07), Max: 98.6 (03-06)  HR: 139 (03-07) (112 - 150)  BP: 125/100 (03-07) (109/84 - 142/106)  RR: 21 (03-07)  SpO2: 96% (03-07)  GENERAL: appears chronically ill  HEAD:  Atraumatic, Normocephalic  ENT: EOMI, PERRLA, conjunctiva and sclera clear, Neck supple, No JVD, moist mucosa  CHEST/LUNG: Clear to auscultation bilaterally; No wheeze, equal breath sounds bilaterally   BACK: No spinal tenderness  HEART: elevated rate, irregular rhythm No murmurs, rubs, or gallops  ABDOMEN: Soft, Nontender, Nondistended; Bowel sounds present  EXTREMITIES:  No clubbing, cyanosis, or edema  PSYCH: Nl behavior, nl affect  NEUROLOGY: AAOx3, non-focal, cranial nerves intact  SKIN: Normal color, No rashes or lesions    Cardiovascular Diagnostic Testing:    ECG: , sinus tachycardia, PVCs    Echo: not in system    CXR: Personally reviewed    Labs: Personally reviewed                        12.7   6.57  )-----------( 247      ( 06 Mar 2020 23:34 )             39.8     03-06    135  |  97  |  15  ----------------------------<  207<H>  4.1   |  23  |  0.92    Ca    9.0      06 Mar 2020 23:34  Phos  4.0     03-06  Mg     2.2     03-06    TPro  7.1  /  Alb  4.2  /  TBili  0.4  /  DBili  x   /  AST  16  /  ALT  13  /  AlkPhos  126<H>  03-06      CARDIAC MARKERS ( 07 Mar 2020 02:14 )  12 ng/L / x     / x     / x     / x     / x      CARDIAC MARKERS ( 06 Mar 2020 23:34 )  13 ng/L / x     / x     / x     / x     / x          Serum Pro-Brain Natriuretic Peptide: 177 pg/mL (03-06 @ 23:34)    Assessment and plan:    Mr Parra is an 81 yo male with a hx of known metastatic prostate cancer (DNR) with a recent admission for SVT.   He was found to have some dropped beats, unclear if Mobitz Type II vs nonconducted apc's.  No signs of active ischemia.    - will discuss with EP Attending in am  - for now hold amiodarone  - monitor on telemetry      For all Cardiology service contact information, go to [email or web address] and use "[email or web address]" to login.

## 2020-03-07 NOTE — H&P ADULT - ATTENDING COMMENTS
81 yo male with a hx of known metastatic prostate cancer, DMT2, with a recent admission for SVT and was initiated on amiodarone. Patient presenting with fatigue found to be tachycardic HR in 140s in ED. Confirmed HPI and ROS with patient and daughter at bedside  Vitals reviewed and physically examined patient at bedside: Agree with resident's findings. Annotations made above  Labs, imaging and EKG personally reviewed  Sinus Tach unclear if Mobitz Type I vs II vs nonconducted PACs. Troponin stable at 13 and 12. TSH WNL Consider addition of free T4. No clear infectious causes except for recent shingle outbreak. Monitor on tele. Monitor lytes. Replete for Mg >2 K>4 Will hold Amio per Cardiology request. Primary day team to f/u with EP recs.   Confirmed DNR status with patient. Patient wants to discuss Page 2 of MOLST with both daughter and will ask primary team to complete remainder of MOL[facility].  Patient previously unknown to me and I was assigned to precept this case with housestaff resident, Dr. Cuellar. My involvement in this case consisted only of the initial history, physical and management plan. Primary day team to assume care in AM.

## 2020-03-07 NOTE — DISCHARGE NOTE PROVIDER - NSDCFUSCHEDAPPT_GEN_ALL_CORE_FT
CLEO [facility] [address] ; 03/11/2020 ; [facility]PP Intmed [address] [facility]  [facility] ; NPP Cardio Electro [address] GALLO [facility] [address] ; 04/01/2020 ; NPP Cardio Electro [address] Dr GALLO [facility] ; 06/04/2020 ; NPP OrthoSurg [address] [facility] CLEO [facility] ; 03/11/2020 ; NPP Intmed [address] [facility]  [facility] 03[facility]2020 ; NPP Cardio Electro [address] Dr GALLO [facility] ; 04/01/2020 ; NPP Cardio Electro [address] Dr GALLO [facility] ; 06/04/2020 ; NPP OrthoSurg [address] [facility]

## 2020-03-07 NOTE — H&P ADULT - PROBLEM SELECTOR PLAN 2
Patient was found to have dropped beats in the ED. Unclear if Mobitz Type II vs non-conducted PACs.  - follow up EP recs in AM Patient was found to have dropped beats in the ED. Unclear if Mobitz Type II vs Mobitz Type I vs non-conducted PACs.  - follow up EP recs in AM Patient was found to have dropped beats in the ED. Unclear if Mobitz Type I vs Mobitz Type II vs non-conducted PACs.  - follow up EP recs in AM

## 2020-03-07 NOTE — H&P ADULT - PROBLEM SELECTOR PLAN 3
Patient with vesicular rash on LUE. Started valtrex 500 mg TID. Unclear why patient is on subtherapeutic dosing.   - will begin valacyclovir 1g TID  - continue to monitor rash Patient with vesicular rash on LUE. Started valtrex 500 mg TID. Unclear why patient is on subtherapeutic dosing. Patient without renal disease.  - will increase to therapeutic dose of valacyclovir 1g TID  - continue to monitor rash Patient with vesicular rash on LUE. Started valtrex 500 mg TID. Unclear why patient is on subtherapeutic dosing. Patient without renal disease.  - will increase to therapeutic dose of valacyclovir 1g TID  - patient should follow up with outpatient provider on dosing  - continue to monitor rash

## 2020-03-07 NOTE — H&P ADULT - PROBLEM SELECTOR PLAN 1
The patient was recently admitted and treated for an SVT, but presents with persistent tachycardia.  - was seen by EP who recommends holding amiodarone for now  - EP will provide recs in AM  - monitor on telemetry  - keep mg>2 and K>4

## 2020-03-07 NOTE — PROGRESS NOTE ADULT - PROBLEM SELECTOR PLAN 3
Patient with vesicular rash on LUE. Started valtrex 500 mg TID. Unclear why patient is on subtherapeutic dosing. Patient without renal disease.  - will increase to therapeutic dose of valacyclovir 1g TID  - patient should follow up with outpatient provider on dosing  - continue to monitor rash

## 2020-03-07 NOTE — H&P ADULT - NSHPSOCIALHISTORY_GEN_ALL_CORE
The patient has no history of drug, alcohol or tobacco use. Retired orthopedist  The patient has no history of drug, alcohol or tobacco use.

## 2020-03-07 NOTE — H&P ADULT - NSHPREVIEWOFSYSTEMS_GEN_ALL_CORE
REVIEW OF SYSTEMS:  CONSTITUTIONAL: No fever, chills, night sweats, or fatigue  EYES: No eye pain, visual disturbances, or discharge  ENT:  No difficulty hearing, tinnitus, vertigo; No sinus or throat pain  NECK: No pain or stiffness  RESPIRATORY: No cough, wheezing, or hemoptysis; No shortness of breath  CARDIOVASCULAR: SEE HPI  GASTROINTESTINAL: No abdominal or epigastric pain. No nausea, vomiting, or hematemesis; No diarrhea or constipation. No melena or hematochezia.  GENITOURINARY: No dysuria, frequency, hematuria, or incontinence  NEUROLOGICAL: No headaches, memory loss, loss of strength, numbness, or tremors  SKIN: No itching, burning, rashes, or lesions   LYMPH NODES: No enlarged glands  ENDOCRINE: No hot or cold intolerance; No hair loss  MUSCULOSKELETAL: No joint pain or swelling; No muscle, back, or extremity pain  PSYCHIATRIC: No depression, anxiety, mood swings, or difficulty sleeping  HEME/LYMPH: No easy bruising, or bleeding gums  ALLERGY AND IMMUNOLOGIC: No hives or eczema REVIEW OF SYSTEMS:  CONSTITUTIONAL: No fever, chills, night sweats, or fatigue  EYES: No eye pain, visual disturbances, or discharge  ENT:  No difficulty hearing, tinnitus, vertigo; No sinus or throat pain  NECK: No pain or stiffness  RESPIRATORY: No cough, wheezing, or hemoptysis; No shortness of breath  CARDIOVASCULAR: SEE HPI  GASTROINTESTINAL: No abdominal or epigastric pain. No nausea, vomiting, or hematemesis; No diarrhea or constipation. No melena or hematochezia.  GENITOURINARY: No dysuria, frequency, hematuria, or incontinence  NEUROLOGICAL: No headaches, memory loss, loss of strength, numbness, or tremors  SKIN: SEE HPI  LYMPH NODES: No enlarged glands  ENDOCRINE: No hot or cold intolerance; No hair loss  MUSCULOSKELETAL: No joint pain or swelling; No muscle, back, or extremity pain  PSYCHIATRIC: No depression, anxiety, mood swings, or difficulty sleeping  HEME/LYMPH: No easy bruising, or bleeding gums  ALLERGY AND IMMUNOLOGIC: No hives or eczema

## 2020-03-07 NOTE — PROGRESS NOTE ADULT - PROBLEM SELECTOR PLAN 2
Patient was found to have dropped beats in the ED. Unclear if Mobitz Type I vs Mobitz Type II vs non-conducted PACs.  - follow up EP recs in AM

## 2020-03-07 NOTE — PROGRESS NOTE ADULT - ATTENDING COMMENTS
Sheree Ray   HOspitalist    I have reviewed the Telemetry with the technician and pt is current in NSR in the 70's.  I have spoken with Pt's daughter who is a Nephrologist and she requested to discuss her father's case with the EP/Cardiology team .  I have spoken with the EP fellow who states that he will come to talk to the daughter but is not sure when.  Daughter is willing to take her father home and will follow up with the outpatient EP as instructed.  As per EP , the Mobitz one is an expected events in the setting of Atrial tachycardia and no further intervention is recommended currently.  Patient is seen and is currently hemodynamically stable .  Dc home and f/up with PCP/EP / Oncologist .  DC time 45mns.      Sheree Lagosre   HOspitalist   [phone]

## 2020-03-07 NOTE — PROGRESS NOTE ADULT - PROBLEM SELECTOR PLAN 6
- DVT: lovenox  - Diet: consistent carbohydrates  - Dispo: PT evaluation PCP is to be notified of the hospital admission and DC

## 2020-03-08 LAB
CULTURE RESULTS: SIGNIFICANT CHANGE UP
SPECIMEN SOURCE: SIGNIFICANT CHANGE UP

## 2020-03-09 ENCOUNTER — TRANSCRIPTION ENCOUNTER (OUTPATIENT)
Age: 80
End: 2020-03-09

## 2020-03-11 ENCOUNTER — APPOINTMENT (OUTPATIENT)
Dept: INTERNAL MEDICINE | Facility: CLINIC | Age: 80
End: 2020-03-11
Payer: MEDICARE

## 2020-03-11 VITALS
TEMPERATURE: 98 F | BODY MASS INDEX: 20.46 KG/M2 | SYSTOLIC BLOOD PRESSURE: 140 MMHG | DIASTOLIC BLOOD PRESSURE: 78 MMHG | HEIGHT: 68 IN | WEIGHT: 135 LBS

## 2020-03-11 DIAGNOSIS — Z48.02 ENCOUNTER FOR REMOVAL OF SUTURES: ICD-10-CM

## 2020-03-11 PROBLEM — B02.9 ZOSTER WITHOUT COMPLICATIONS: Chronic | Status: ACTIVE | Noted: 2020-03-07

## 2020-03-11 PROBLEM — E11.9 TYPE 2 DIABETES MELLITUS WITHOUT COMPLICATIONS: Chronic | Status: ACTIVE | Noted: 2020-03-07

## 2020-03-11 PROCEDURE — 36415 COLL VENOUS BLD VENIPUNCTURE: CPT

## 2020-03-11 PROCEDURE — 99213 OFFICE O/P EST LOW 20 MIN: CPT | Mod: 25

## 2020-03-11 NOTE — HISTORY OF PRESENT ILLNESS
[Other: _____] : [unfilled] [FreeTextEntry1] : suture removal [de-identified] : He is here today to have sutures removed from the left side of his face after a fall.\par Over the weekend he had a CT with contrast. He held metformin for 2 days. His daughter, who is a nephrologist, would like to check his renal function.

## 2020-03-12 ENCOUNTER — TRANSCRIPTION ENCOUNTER (OUTPATIENT)
Age: 80
End: 2020-03-12

## 2020-03-12 LAB
ANION GAP SERPL CALC-SCNC: 16 MMOL/L
BUN SERPL-MCNC: 24 MG/DL
CALCIUM SERPL-MCNC: 9.2 MG/DL
CHLORIDE SERPL-SCNC: 99 MMOL/L
CO2 SERPL-SCNC: 22 MMOL/L
CREAT SERPL-MCNC: 0.97 MG/DL
GLUCOSE SERPL-MCNC: 141 MG/DL
POTASSIUM SERPL-SCNC: 4.8 MMOL/L
SODIUM SERPL-SCNC: 137 MMOL/L

## 2020-03-12 RX ORDER — ABIRATERONE ACETATE 250 MG/1
0 TABLET ORAL
Qty: 0 | Refills: 0 | DISCHARGE

## 2020-03-12 RX ORDER — ZOLEDRONIC ACID 5 MG/100ML
0 INJECTION, SOLUTION INTRAVENOUS
Qty: 0 | Refills: 0 | DISCHARGE

## 2020-03-16 ENCOUNTER — TRANSCRIPTION ENCOUNTER (OUTPATIENT)
Age: 80
End: 2020-03-16

## 2020-03-16 RX ORDER — METFORMIN ER 500 MG 500 MG/1
500 TABLET ORAL
Qty: 180 | Refills: 1 | Status: ACTIVE | COMMUNITY
Start: 2019-06-13 | End: 1900-01-01

## 2020-04-01 ENCOUNTER — APPOINTMENT (OUTPATIENT)
Dept: ELECTROPHYSIOLOGY | Facility: CLINIC | Age: 80
End: 2020-04-01

## 2020-06-02 ENCOUNTER — TRANSCRIPTION ENCOUNTER (OUTPATIENT)
Age: 80
End: 2020-06-02

## 2020-06-03 ENCOUNTER — APPOINTMENT (OUTPATIENT)
Dept: ELECTROPHYSIOLOGY | Facility: CLINIC | Age: 80
End: 2020-06-03
Payer: MEDICARE

## 2020-06-03 VITALS
SYSTOLIC BLOOD PRESSURE: 145 MMHG | HEART RATE: 63 BPM | HEIGHT: 68 IN | WEIGHT: 138 LBS | DIASTOLIC BLOOD PRESSURE: 76 MMHG | OXYGEN SATURATION: 100 % | BODY MASS INDEX: 20.92 KG/M2

## 2020-06-03 PROCEDURE — 93000 ELECTROCARDIOGRAM COMPLETE: CPT

## 2020-06-03 PROCEDURE — 99213 OFFICE O/P EST LOW 20 MIN: CPT

## 2020-06-03 RX ORDER — ZOLEDRONIC ACID 4 MG/100ML
INJECTION, SOLUTION, CONCENTRATE INTRAVENOUS
Refills: 0 | Status: DISCONTINUED | COMMUNITY
End: 2020-06-03

## 2020-06-04 ENCOUNTER — APPOINTMENT (OUTPATIENT)
Dept: ORTHOPEDIC SURGERY | Facility: CLINIC | Age: 80
End: 2020-06-04

## 2020-06-08 ENCOUNTER — NON-APPOINTMENT (OUTPATIENT)
Age: 80
End: 2020-06-08

## 2020-06-08 NOTE — PHYSICAL EXAM
[General Appearance - Well Developed] : well developed [Normal Appearance] : normal appearance [Well Groomed] : well groomed [General Appearance - Well Nourished] : well nourished [No Deformities] : no deformities [General Appearance - In No Acute Distress] : no acute distress [Normal Conjunctiva] : the conjunctiva exhibited no abnormalities [Eyelids - No Xanthelasma] : the eyelids demonstrated no xanthelasmas [Normal Oral Mucosa] : normal oral mucosa [No Oral Pallor] : no oral pallor [No Oral Cyanosis] : no oral cyanosis [Normal Jugular Venous V Waves Present] : normal jugular venous V waves present [Normal Jugular Venous A Waves Present] : normal jugular venous A waves present [No Jugular Venous Gates A Waves] : no jugular venous gates A waves [Respiration, Rhythm And Depth] : normal respiratory rhythm and effort [Auscultation Breath Sounds / Voice Sounds] : lungs were clear to auscultation bilaterally [Exaggerated Use Of Accessory Muscles For Inspiration] : no accessory muscle use [Heart Rate And Rhythm] : heart rate and rhythm were normal [Heart Sounds] : normal S1 and S2 [Murmurs] : no murmurs present [Abdomen Soft] : soft [Abdomen Tenderness] : non-tender [Abdomen Mass (___ Cm)] : no abdominal mass palpated [Abnormal Walk] : normal gait [Gait - Sufficient For Exercise Testing] : the gait was sufficient for exercise testing [Nail Clubbing] : no clubbing of the fingernails [Cyanosis, Localized] : no localized cyanosis [Petechial Hemorrhages (___cm)] : no petechial hemorrhages [Skin Color & Pigmentation] : normal skin color and pigmentation [] : no rash [No Venous Stasis] : no venous stasis [Skin Lesions] : no skin lesions [No Skin Ulcers] : no skin ulcer [No Xanthoma] : no  xanthoma was observed [Oriented To Time, Place, And Person] : oriented to person, place, and time [Affect] : the affect was normal [Mood] : the mood was normal [No Anxiety] : not feeling anxious

## 2020-06-08 NOTE — REASON FOR VISIT
[FreeTextEntry1] : Mr. Parra was seen in [facility]. He was accompanied by his daughter. For our records, please allow me to summarize my findings. 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 On his first admission, he was given Adenosine and Lopressor without termination of rhythm. He was subsequently started on a 5gm Amiodarone load and discharged on Amiodarone 200mg daily. In 3/2020, he was brought to the ER by daughter who stated he appeared lethargic and after listening to his HR with stethoscope, felt it was racing. While being monitored on telemetry, patient was having dropped beats in the setting of Mobitz Type I, and ongoing atrial tachycardia, which was deemed to be physiologic and patient was discharged on previous dose of Amiodarone. \par \par Today, he reports feeling well. No complaints of lethargy, fatigue, chest pain, lightheadedness, dizziness, lightheadedness, syncope.

## 2020-06-08 NOTE — DISCUSSION/SUMMARY
[FreeTextEntry1] : In summary, this is an 80 year old man with history of metastatic prostate cancer and two recent ER visits for SVT. We will continue current regimen of Amiodarone 200mg daily. Black box warning discussed and advised to follow up annually for TSH, LFT, PFT and ophthalmologic exam. Recommended to follow up with PMD moving forward. \par \par Mr. Parra appeared to understand the whole discussion and verbalized that all of her questions were answered to her satisfaction.\par \par Thank you for allowing me to be involved in the care of this pleasant woman. Please feel free to contact me with any questions.\par

## 2020-06-08 NOTE — END OF VISIT
[FreeTextEntry3] : Agree with above NP note. 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 In light of age and metastatic prostate ca with continue AAD therapy and defer ablation unless symptoms recur. f/u as needed for further arrhythmia.

## 2020-08-25 ENCOUNTER — NON-APPOINTMENT (OUTPATIENT)
Age: 80
End: 2020-08-25

## 2020-08-25 ENCOUNTER — APPOINTMENT (OUTPATIENT)
Dept: INTERNAL MEDICINE | Facility: CLINIC | Age: 80
End: 2020-08-25
Payer: MEDICARE

## 2020-08-25 VITALS
TEMPERATURE: 97 F | BODY MASS INDEX: 18.34 KG/M2 | HEIGHT: 68 IN | RESPIRATION RATE: 17 BRPM | HEART RATE: 62 BPM | SYSTOLIC BLOOD PRESSURE: 124 MMHG | OXYGEN SATURATION: 99 % | WEIGHT: 121 LBS | DIASTOLIC BLOOD PRESSURE: 68 MMHG

## 2020-08-25 DIAGNOSIS — E11.9 TYPE 2 DIABETES MELLITUS W/OUT COMPLICATIONS: ICD-10-CM

## 2020-08-25 DIAGNOSIS — E78.5 HYPERLIPIDEMIA, UNSPECIFIED: ICD-10-CM

## 2020-08-25 DIAGNOSIS — F32.9 MAJOR DEPRESSIVE DISORDER, SINGLE EPISODE, UNSPECIFIED: Chronic | ICD-10-CM

## 2020-08-25 DIAGNOSIS — Z79.899 OTHER LONG TERM (CURRENT) DRUG THERAPY: ICD-10-CM

## 2020-08-25 DIAGNOSIS — Z00.00 ENCOUNTER FOR GENERAL ADULT MEDICAL EXAMINATION W/OUT ABNORMAL FINDINGS: ICD-10-CM

## 2020-08-25 PROBLEM — C61 MALIGNANT NEOPLASM OF PROSTATE: Chronic | Status: ACTIVE | Noted: 2020-02-20

## 2020-08-25 PROCEDURE — G0439: CPT

## 2020-08-25 PROCEDURE — 36415 COLL VENOUS BLD VENIPUNCTURE: CPT

## 2020-08-25 PROCEDURE — 93000 ELECTROCARDIOGRAM COMPLETE: CPT

## 2020-08-25 RX ORDER — ABIRATERONE ACETATE 500 MG/1
500 TABLET, FILM COATED ORAL DAILY
Refills: 0 | Status: DISCONTINUED | COMMUNITY
End: 2020-08-25

## 2020-08-25 RX ORDER — PREDNISONE 5 MG/1
5 TABLET ORAL TWICE DAILY
Refills: 0 | Status: DISCONTINUED | COMMUNITY
End: 2020-08-25

## 2020-08-25 NOTE — PHYSICAL EXAM
[No Acute Distress] : no acute distress [Well Nourished] : well nourished [Well Developed] : well developed [Normal Sclera/Conjunctiva] : normal sclera/conjunctiva [PERRL] : pupils equal round and reactive to light [Well-Appearing] : well-appearing [Normal Outer Ear/Nose] : the outer ears and nose were normal in appearance [EOMI] : extraocular movements intact [Normal TMs] : both tympanic membranes were normal [No JVD] : no jugular venous distention [No Lymphadenopathy] : no lymphadenopathy [Thyroid Normal, No Nodules] : the thyroid was normal and there were no nodules present [Supple] : supple [No Respiratory Distress] : no respiratory distress  [Clear to Auscultation] : lungs were clear to auscultation bilaterally [No Accessory Muscle Use] : no accessory muscle use [Normal Rate] : normal rate  [Regular Rhythm] : with a regular rhythm [Normal S1, S2] : normal S1 and S2 [No Carotid Bruits] : no carotid bruits [No Murmur] : no murmur heard [Pedal Pulses Present] : the pedal pulses are present [No Varicosities] : no varicosities [No Edema] : there was no peripheral edema [No Extremity Clubbing/Cyanosis] : no extremity clubbing/cyanosis [Soft] : abdomen soft [Non Tender] : non-tender [No Masses] : no abdominal mass palpated [Non-distended] : non-distended [No HSM] : no HSM [Normal Bowel Sounds] : normal bowel sounds [Normal Anterior Cervical Nodes] : no anterior cervical lymphadenopathy [Normal Posterior Cervical Nodes] : no posterior cervical lymphadenopathy [No CVA Tenderness] : no CVA  tenderness [No Spinal Tenderness] : no spinal tenderness [Grossly Normal Strength/Tone] : grossly normal strength/tone [No Joint Swelling] : no joint swelling [No Rash] : no rash [Coordination Grossly Intact] : coordination grossly intact [No Focal Deficits] : no focal deficits [Normal Affect] : the affect was normal [Normal Gait] : normal gait [Alert and Oriented x3] : oriented to person, place, and time [Normal Insight/Judgement] : insight and judgment were intact

## 2020-08-25 NOTE — REVIEW OF SYSTEMS
[Fever] : no fever [Chills] : no chills [Night Sweats] : no night sweats [Recent Change In Weight] : ~T recent weight change [FreeTextEntry9] : as noted in HPI  [Negative] : Heme/Lymph

## 2020-08-25 NOTE — HEALTH RISK ASSESSMENT
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

## 2020-08-25 NOTE — HISTORY OF PRESENT ILLNESS
[FreeTextEntry1] : annual physical [de-identified] : 81 y/o male with h/o DM, metastatic prostate and hypertriglyceridemia who is here for an annual physical.\par he is on amiodarone for SVT. EP says he no longer needs to f/u with him. needs yearly TSH, LFTS, optho exam and PFTs. he is refusing PFTs now\par PSA up to 190s. Alk Phos in the 300s. Oncology is recommending chemo but he is refusing\par he has lost weight but has a good appetite\par He is having some leg pain.

## 2020-08-25 NOTE — PLAN
[FreeTextEntry1] : Check labs today, including CBC, CMP, TSH, HbA1c, lipids \par Continue amiodarone for SVT. Cardiology referral\par Cont  metformin\par Declines all vaccinations\par f/u 3-4 months

## 2020-08-31 ENCOUNTER — TRANSCRIPTION ENCOUNTER (OUTPATIENT)
Age: 80
End: 2020-08-31

## 2020-08-31 LAB
25(OH)D3 SERPL-MCNC: 61.1 NG/ML
ALBUMIN SERPL ELPH-MCNC: 4.4 G/DL
ALP BLD-CCNC: 363 U/L
ALT SERPL-CCNC: 20 U/L
ANION GAP SERPL CALC-SCNC: 17 MMOL/L
APPEARANCE: CLEAR
AST SERPL-CCNC: 26 U/L
BACTERIA: NEGATIVE
BASOPHILS # BLD AUTO: 0.02 K/UL
BASOPHILS NFR BLD AUTO: 0.4 %
BILIRUB SERPL-MCNC: 0.3 MG/DL
BILIRUBIN URINE: NEGATIVE
BLOOD URINE: NEGATIVE
BUN SERPL-MCNC: 18 MG/DL
CALCIUM SERPL-MCNC: 8.7 MG/DL
CHLORIDE SERPL-SCNC: 99 MMOL/L
CHOLEST SERPL-MCNC: 187 MG/DL
CHOLEST/HDLC SERPL: 4.2 RATIO
CO2 SERPL-SCNC: 20 MMOL/L
COLOR: YELLOW
CREAT SERPL-MCNC: 0.88 MG/DL
EOSINOPHIL # BLD AUTO: 0.03 K/UL
EOSINOPHIL NFR BLD AUTO: 0.5 %
ESTIMATED AVERAGE GLUCOSE: 126 MG/DL
GLUCOSE QUALITATIVE U: NEGATIVE
GLUCOSE SERPL-MCNC: 117 MG/DL
HBA1C MFR BLD HPLC: 6 %
HCT VFR BLD CALC: 35.8 %
HDLC SERPL-MCNC: 44 MG/DL
HGB BLD-MCNC: 10.7 G/DL
HYALINE CASTS: 0 /LPF
IMM GRANULOCYTES NFR BLD AUTO: 0.7 %
KETONES URINE: NEGATIVE
LDLC SERPL CALC-MCNC: 98 MG/DL
LEUKOCYTE ESTERASE URINE: NEGATIVE
LYMPHOCYTES # BLD AUTO: 0.9 K/UL
LYMPHOCYTES NFR BLD AUTO: 16 %
MAN DIFF?: NORMAL
MCHC RBC-ENTMCNC: 29.5 PG
MCHC RBC-ENTMCNC: 29.9 GM/DL
MCV RBC AUTO: 98.6 FL
MICROSCOPIC-UA: NORMAL
MONOCYTES # BLD AUTO: 0.5 K/UL
MONOCYTES NFR BLD AUTO: 8.9 %
NEUTROPHILS # BLD AUTO: 4.13 K/UL
NEUTROPHILS NFR BLD AUTO: 73.5 %
NITRITE URINE: NEGATIVE
PH URINE: 7
PLATELET # BLD AUTO: 293 K/UL
POTASSIUM SERPL-SCNC: 4.7 MMOL/L
PROT SERPL-MCNC: 6.9 G/DL
PROTEIN URINE: NORMAL
RBC # BLD: 3.63 M/UL
RBC # FLD: 15.2 %
RED BLOOD CELLS URINE: 1 /HPF
SODIUM SERPL-SCNC: 136 MMOL/L
SPECIFIC GRAVITY URINE: 1.02
SQUAMOUS EPITHELIAL CELLS: 0 /HPF
TRIGL SERPL-MCNC: 223 MG/DL
TSH SERPL-ACNC: 3.29 UIU/ML
UROBILINOGEN URINE: NORMAL
WBC # FLD AUTO: 5.62 K/UL
WHITE BLOOD CELLS URINE: 0 /HPF

## 2020-09-01 ENCOUNTER — TRANSCRIPTION ENCOUNTER (OUTPATIENT)
Age: 80
End: 2020-09-01

## 2020-09-05 ENCOUNTER — OUTPATIENT (OUTPATIENT)
Dept: OUTPATIENT SERVICES | Facility: HOSPITAL | Age: 80
LOS: 1 days | End: 2020-09-05
Payer: MEDICARE

## 2020-09-05 ENCOUNTER — APPOINTMENT (OUTPATIENT)
Dept: CT IMAGING | Facility: IMAGING CENTER | Age: 80
End: 2020-09-05
Payer: MEDICARE

## 2020-09-05 DIAGNOSIS — C61 MALIGNANT NEOPLASM OF PROSTATE: ICD-10-CM

## 2020-09-05 DIAGNOSIS — C79.51 SECONDARY MALIGNANT NEOPLASM OF BONE: ICD-10-CM

## 2020-09-05 PROCEDURE — 74177 CT ABD & PELVIS W/CONTRAST: CPT

## 2020-09-05 PROCEDURE — 74177 CT ABD & PELVIS W/CONTRAST: CPT | Mod: 26

## 2020-09-05 PROCEDURE — 71260 CT THORAX DX C+: CPT | Mod: 26

## 2020-09-05 PROCEDURE — 71260 CT THORAX DX C+: CPT

## 2020-10-13 ENCOUNTER — TRANSCRIPTION ENCOUNTER (OUTPATIENT)
Age: 80
End: 2020-10-13

## 2020-12-08 ENCOUNTER — APPOINTMENT (OUTPATIENT)
Dept: INTERNAL MEDICINE | Facility: CLINIC | Age: 80
End: 2020-12-08

## 2021-02-18 ENCOUNTER — TRANSCRIPTION ENCOUNTER (OUTPATIENT)
Age: 81
End: 2021-02-18

## 2021-02-18 RX ORDER — AMIODARONE HYDROCHLORIDE 200 MG/1
200 TABLET ORAL DAILY
Qty: 90 | Refills: 1 | Status: ACTIVE | COMMUNITY
Start: 2020-03-18 | End: 1900-01-01

## 2021-02-19 ENCOUNTER — TRANSCRIPTION ENCOUNTER (OUTPATIENT)
Age: 81
End: 2021-02-19

## 2021-03-08 ENCOUNTER — TRANSCRIPTION ENCOUNTER (OUTPATIENT)
Age: 81
End: 2021-03-08

## 2021-03-10 ENCOUNTER — APPOINTMENT (OUTPATIENT)
Dept: INTERNAL MEDICINE | Facility: CLINIC | Age: 81
End: 2021-03-10

## 2021-05-23 ENCOUNTER — INPATIENT (INPATIENT)
Facility: HOSPITAL | Age: 81
LOS: 5 days | Discharge: HOSPICE | DRG: 85 | End: 2021-05-29
Attending: INTERNAL MEDICINE | Admitting: FAMILY MEDICINE
Payer: MEDICARE

## 2021-05-23 VITALS
RESPIRATION RATE: 16 BRPM | TEMPERATURE: 97 F | SYSTOLIC BLOOD PRESSURE: 121 MMHG | DIASTOLIC BLOOD PRESSURE: 70 MMHG | OXYGEN SATURATION: 99 % | HEART RATE: 88 BPM
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
## 2021-05-23 DIAGNOSIS — Z29.9 ENCOUNTER FOR PROPHYLACTIC MEASURES, UNSPECIFIED: ICD-10-CM

## 2021-05-23 DIAGNOSIS — R53.1 WEAKNESS: ICD-10-CM

## 2021-05-23 DIAGNOSIS — E87.1 HYPO-OSMOLALITY AND HYPONATREMIA: ICD-10-CM

## 2021-05-23 DIAGNOSIS — C61 MALIGNANT NEOPLASM OF PROSTATE: ICD-10-CM

## 2021-05-23 DIAGNOSIS — I47.1 SUPRAVENTRICULAR TACHYCARDIA: ICD-10-CM

## 2021-05-23 DIAGNOSIS — R62.7 ADULT FAILURE TO THRIVE: ICD-10-CM

## 2021-05-23 LAB
ALBUMIN SERPL ELPH-MCNC: 2.3 G/DL — LOW (ref 3.3–5)
ALP SERPL-CCNC: 663 U/L — HIGH (ref 40–120)
ALT FLD-CCNC: 42 U/L — SIGNIFICANT CHANGE UP (ref 12–78)
ANION GAP SERPL CALC-SCNC: 8 MMOL/L — SIGNIFICANT CHANGE UP (ref 5–17)
ANION GAP SERPL CALC-SCNC: 8 MMOL/L — SIGNIFICANT CHANGE UP (ref 5–17)
APPEARANCE UR: CLEAR — SIGNIFICANT CHANGE UP
APTT BLD: 30 SEC — SIGNIFICANT CHANGE UP (ref 27.5–35.5)
AST SERPL-CCNC: 57 U/L — HIGH (ref 15–37)
BACTERIA # UR AUTO: ABNORMAL
BASOPHILS # BLD AUTO: 0.01 K/UL — SIGNIFICANT CHANGE UP (ref 0–0.2)
BASOPHILS NFR BLD AUTO: 0.2 % — SIGNIFICANT CHANGE UP (ref 0–2)
BILIRUB SERPL-MCNC: 0.5 MG/DL — SIGNIFICANT CHANGE UP (ref 0.2–1.2)
BILIRUB UR-MCNC: NEGATIVE — SIGNIFICANT CHANGE UP
BLD GP AB SCN SERPL QL: SIGNIFICANT CHANGE UP
BUN SERPL-MCNC: 14 MG/DL — SIGNIFICANT CHANGE UP (ref 7–23)
BUN SERPL-MCNC: 22 MG/DL — SIGNIFICANT CHANGE UP (ref 7–23)
CALCIUM SERPL-MCNC: 7 MG/DL — LOW (ref 8.5–10.1)
CALCIUM SERPL-MCNC: 7.7 MG/DL — LOW (ref 8.5–10.1)
CHLORIDE SERPL-SCNC: 102 MMOL/L — SIGNIFICANT CHANGE UP (ref 96–108)
CHLORIDE SERPL-SCNC: 98 MMOL/L — SIGNIFICANT CHANGE UP (ref 96–108)
CO2 SERPL-SCNC: 23 MMOL/L — SIGNIFICANT CHANGE UP (ref 22–31)
CO2 SERPL-SCNC: 23 MMOL/L — SIGNIFICANT CHANGE UP (ref 22–31)
COLOR SPEC: YELLOW — SIGNIFICANT CHANGE UP
CREAT SERPL-MCNC: 0.29 MG/DL — LOW (ref 0.5–1.3)
CREAT SERPL-MCNC: 0.46 MG/DL — LOW (ref 0.5–1.3)
DIFF PNL FLD: NEGATIVE — SIGNIFICANT CHANGE UP
EOSINOPHIL # BLD AUTO: 0 K/UL — SIGNIFICANT CHANGE UP (ref 0–0.5)
EOSINOPHIL NFR BLD AUTO: 0 % — SIGNIFICANT CHANGE UP (ref 0–6)
EPI CELLS # UR: SIGNIFICANT CHANGE UP
GLUCOSE SERPL-MCNC: 167 MG/DL — HIGH (ref 70–99)
GLUCOSE SERPL-MCNC: 188 MG/DL — HIGH (ref 70–99)
GLUCOSE UR QL: NEGATIVE — SIGNIFICANT CHANGE UP
HCT VFR BLD CALC: 25.9 % — LOW (ref 39–50)
HGB BLD-MCNC: 8.2 G/DL — LOW (ref 13–17)
IMM GRANULOCYTES NFR BLD AUTO: 1.2 % — SIGNIFICANT CHANGE UP (ref 0–1.5)
INR BLD: 1.33 RATIO — HIGH (ref 0.88–1.16)
KETONES UR-MCNC: ABNORMAL
LACTATE SERPL-SCNC: 1.1 MMOL/L — SIGNIFICANT CHANGE UP (ref 0.7–2)
LEUKOCYTE ESTERASE UR-ACNC: ABNORMAL
LYMPHOCYTES # BLD AUTO: 0.94 K/UL — LOW (ref 1–3.3)
LYMPHOCYTES # BLD AUTO: 14.6 % — SIGNIFICANT CHANGE UP (ref 13–44)
MCHC RBC-ENTMCNC: 28.1 PG — SIGNIFICANT CHANGE UP (ref 27–34)
MCHC RBC-ENTMCNC: 31.7 GM/DL — LOW (ref 32–36)
MCV RBC AUTO: 88.7 FL — SIGNIFICANT CHANGE UP (ref 80–100)
MONOCYTES # BLD AUTO: 0.6 K/UL — SIGNIFICANT CHANGE UP (ref 0–0.9)
MONOCYTES NFR BLD AUTO: 9.3 % — SIGNIFICANT CHANGE UP (ref 2–14)
NEUTROPHILS # BLD AUTO: 4.81 K/UL — SIGNIFICANT CHANGE UP (ref 1.8–7.4)
NEUTROPHILS NFR BLD AUTO: 74.7 % — SIGNIFICANT CHANGE UP (ref 43–77)
NITRITE UR-MCNC: NEGATIVE — SIGNIFICANT CHANGE UP
NRBC # BLD: 0 /100 WBCS — SIGNIFICANT CHANGE UP (ref 0–0)
OSMOLALITY UR: 401 MOSM/KG — SIGNIFICANT CHANGE UP (ref 50–1200)
PH UR: 6 — SIGNIFICANT CHANGE UP (ref 5–8)
PLATELET # BLD AUTO: 310 K/UL — SIGNIFICANT CHANGE UP (ref 150–400)
POTASSIUM SERPL-MCNC: 3.7 MMOL/L — SIGNIFICANT CHANGE UP (ref 3.5–5.3)
POTASSIUM SERPL-MCNC: 4.3 MMOL/L — SIGNIFICANT CHANGE UP (ref 3.5–5.3)
POTASSIUM SERPL-SCNC: 3.7 MMOL/L — SIGNIFICANT CHANGE UP (ref 3.5–5.3)
POTASSIUM SERPL-SCNC: 4.3 MMOL/L — SIGNIFICANT CHANGE UP (ref 3.5–5.3)
PROT SERPL-MCNC: 6.6 G/DL — SIGNIFICANT CHANGE UP (ref 6–8.3)
PROT UR-MCNC: 30 MG/DL
PROTHROM AB SERPL-ACNC: 15.3 SEC — HIGH (ref 10.6–13.6)
RAPID RVP RESULT: SIGNIFICANT CHANGE UP
RBC # BLD: 2.92 M/UL — LOW (ref 4.2–5.8)
RBC # FLD: 18.8 % — HIGH (ref 10.3–14.5)
RBC CASTS # UR COMP ASSIST: SIGNIFICANT CHANGE UP /HPF (ref 0–4)
SARS-COV-2 RNA SPEC QL NAA+PROBE: SIGNIFICANT CHANGE UP
SODIUM SERPL-SCNC: 129 MMOL/L — LOW (ref 135–145)
SODIUM SERPL-SCNC: 133 MMOL/L — LOW (ref 135–145)
SP GR SPEC: 1.01 — SIGNIFICANT CHANGE UP (ref 1.01–1.02)
TROPONIN I SERPL-MCNC: <.015 NG/ML — SIGNIFICANT CHANGE UP (ref 0.01–0.04)
UROBILINOGEN FLD QL: 4
WBC # BLD: 6.44 K/UL — SIGNIFICANT CHANGE UP (ref 3.8–10.5)
WBC # FLD AUTO: 6.44 K/UL — SIGNIFICANT CHANGE UP (ref 3.8–10.5)
WBC UR QL: SIGNIFICANT CHANGE UP

## 2021-05-23 PROCEDURE — 70450 CT HEAD/BRAIN W/O DYE: CPT | Mod: 26

## 2021-05-23 PROCEDURE — 99222 1ST HOSP IP/OBS MODERATE 55: CPT

## 2021-05-23 PROCEDURE — 93010 ELECTROCARDIOGRAM REPORT: CPT

## 2021-05-23 PROCEDURE — 72125 CT NECK SPINE W/O DYE: CPT | Mod: 26

## 2021-05-23 PROCEDURE — 71045 X-RAY EXAM CHEST 1 VIEW: CPT | Mod: 26

## 2021-05-23 PROCEDURE — 99497 ADVNCD CARE PLAN 30 MIN: CPT | Mod: GC,25

## 2021-05-23 PROCEDURE — 99285 EMERGENCY DEPT VISIT HI MDM: CPT | Mod: CS

## 2021-05-23 RX ORDER — ENOXAPARIN SODIUM 100 MG/ML
40 INJECTION SUBCUTANEOUS DAILY
Refills: 0 | Status: DISCONTINUED | OUTPATIENT
Start: 2021-05-23 | End: 2021-05-23

## 2021-05-23 RX ORDER — SODIUM CHLORIDE 9 MG/ML
1550 INJECTION INTRAMUSCULAR; INTRAVENOUS; SUBCUTANEOUS ONCE
Refills: 0 | Status: COMPLETED | OUTPATIENT
Start: 2021-05-23 | End: 2021-05-23

## 2021-05-23 RX ORDER — ACETAMINOPHEN 500 MG
650 TABLET ORAL ONCE
Refills: 0 | Status: COMPLETED | OUTPATIENT
Start: 2021-05-23 | End: 2021-05-23

## 2021-05-23 RX ORDER — SODIUM CHLORIDE 9 MG/ML
1000 INJECTION INTRAMUSCULAR; INTRAVENOUS; SUBCUTANEOUS
Refills: 0 | Status: DISCONTINUED | OUTPATIENT
Start: 2021-05-23 | End: 2021-05-29

## 2021-05-23 RX ADMIN — SODIUM CHLORIDE 1550 MILLILITER(S): 9 INJECTION INTRAMUSCULAR; INTRAVENOUS; SUBCUTANEOUS at 13:01

## 2021-05-23 RX ADMIN — SODIUM CHLORIDE 60 MILLILITER(S): 9 INJECTION INTRAMUSCULAR; INTRAVENOUS; SUBCUTANEOUS at 17:45

## 2021-05-23 RX ADMIN — SODIUM CHLORIDE 1550 MILLILITER(S): 9 INJECTION INTRAMUSCULAR; INTRAVENOUS; SUBCUTANEOUS at 15:10

## 2021-05-23 NOTE — ED PROVIDER NOTE - CARE PLAN
Principal Discharge DX:	Weakness generalized  Secondary Diagnosis:	Prostate cancer   Principal Discharge DX:	Weakness generalized  Secondary Diagnosis:	Prostate cancer  Secondary Diagnosis:	Hyponatremia  Secondary Diagnosis:	Dehydration

## 2021-05-23 NOTE — H&P ADULT - HISTORY OF PRESENT ILLNESS
81 M PMH prostate ca with bone mets, SVT (on amio), anemia (s/p transfusion), recent decrease PO intake/weightloss with presenting to ED with fall from home and weakness.     Note, patient was dropped off from EMS with a photo from [facility] contact, and initially requested [facility] but per EMS medic they were on diversion and subsequently patient transported here to [place].    In the ED:  VSS  CBC pert for Hgb 8.2 (unknown baseline), MCV 88.7  INR 1.3, lactate 1.1 CMP sig for Na 129, BUN/Cr wnl, ca wnl corrected, trops negative, Alphos 663, AST/ALT 57/42 (mild elevation), Glu 188    UA negative  s/p .55 NS bolus  CXR: negative chest on wet read  EKG:   81 M PMH prostate ca with bone mets, SVT (on amio), anemia (s/p transfusion), decrease PO intake/weightloss presenting to ED with increased weakness and inability to use walker with assistance. The patient ws seen with his daughter More, at bedside, who provided additional history. He was first diagnosed with stage 4 prostate cancer 5 years ago and underwent hormonal treatments. 1 year ago, the patient refused chemotherapy and has been conservatively managed since. The pt's daughter reports that he was found down in the bedroom after an unwitnessed fall. PD arrived and assisted the pt off the floor, however, he was not brought to ED at that time. This am, the pt was noted to have significantly increased difficulty transferring and ambulating with his walker. Pt's daughter contacted oncologist, Dr. Dougherty who advised EMS. Of note, the daughter states that the patient has been declining gradually over the course of the year with weight loss, decreased appetite and weakness, however in the last week, he seems to have had a rapid progression and is now barely eating solids.    In the ED:  VSS  CBC pert for Hgb 8.2 (unknown baseline), MCV 88.7  INR 1.3, lactate 1.1 CMP sig for Na 129, BUN/Cr wnl, ca wnl corrected, trops negative, Alphos 663, AST/ALT 57/42 (mild elevation), Glu 188    UA negative  s/p .55 NS bolus  CXR: negative chest on wet read  EKG: NSR 85 BPM   81 M PMH prostate ca with bone mets, SVT (on amio), anemia (s/p transfusion), decrease PO intake/weightloss presenting to ED with increased weakness and inability to use walker with assistance. The patient ws seen with his daughter More, at bedside, who provided additional history. He was first diagnosed with stage 4 prostate cancer 5 years ago and underwent hormonal treatments. 1 year ago, the patient refused chemotherapy and has been conservatively managed since. The pt's daughter reports that he was found down in the bedroom after an unwitnessed fall. PD arrived and assisted the pt off the floor, however, he was not brought to ED at that time. This am, the pt was noted to have significantly increased difficulty transferring and ambulating with his walker. Pt's daughter contacted oncologist, Dr. Dougherty who advised EMS. Of note, the daughter states that the patient has been declining gradually over the course of the year with weight loss, decreased appetite and weakness, however in the last week, he seems to have had a rapid progression and is now barely eating solids.    In the ED:  VSS  CBC pert for Hgb 8.2 (unknown baseline), MCV 88.7  INR 1.3, lactate 1.1 CMP sig for Na 129, BUN/Cr wnl, ca wnl corrected, trops negative, Alphos 663, AST/ALT 57/42 (mild elevation), Glu 188    UA negative  s/p 1550cc NS bolus  CXR: negative chest on wet read  EKG: NSR 85 BPM   81 M PMH prostate ca with bone mets, SVT (on amio), anemia (s/p transfusion), decrease PO intake/weightloss presenting to ED with increased weakness and inability to use walker with assistance. The patient ws seen with his daughter More, at bedside, who provided additional history. He was first diagnosed with stage 4 prostate cancer 5 years ago and underwent hormonal treatments. 1 year ago, the patient refused chemotherapy and has been conservatively managed since. The pt's daughter reports that he was found down in the bedroom after an unwitnessed fall. PD arrived and assisted the pt off the floor, however, he was not brought to ED at that time. This am, the pt was noted to have significantly increased difficulty transferring and ambulating with his walker. Pt's daughter contacted oncologist, Dr. Dougherty who advised EMS. Of note, the daughter states that the patient has been declining gradually over the course of the year with weight loss, decreased appetite and weakness, however in the last week, he seems to have had a rapid progression and is now barely eating solids. Family requesting palliative consult. PT is DNR/DNI. Denies fever, chills, headaches, dizziness, blurred vision, chest pain, palpitations, numbness/tingling, abdominal pain, diarrhea.    In the ED:  VSS  CBC pert for Hgb 8.2 (unknown baseline), MCV 88.7  INR 1.3, lactate 1.1 CMP sig for Na 129, BUN/Cr wnl, ca wnl corrected, trops negative, Alphos 663, AST/ALT 57/42 (mild elevation), Glu 188    UA negative  s/p 1550cc NS bolus  CXR: negative chest on wet read  EKG: NSR 85 BPM

## 2021-05-23 NOTE — H&P ADULT - PROBLEM SELECTOR PLAN 3
-Patient with hx of prostatic cancer with METS  -Palliative care consult for formal discussion of GOC at this time  -MOL[facility] in Chart, DNR/DNI  -Patient denies current pain, will escalate as needed -Patient with hx of stage 4 prostatic cancer with METS  -Palliative care consult for formal discussion of GOC at this time  -MOLST in Chart, DNR/DNI  -Patient denies current pain, will escalate as needed Likely acute given hx of recent decrease PO intake, Na 129 with glu 180  -Likely hypovolemic hyponatremia  -S/P 1.5 L NS bolus  -continue NS at 60cc/hr for 8 hrs.  -Follow up BMP on 05/23/2021  -Check serum/urine osms  -May have underlying component of SIADH given recent fall/trauma and endogenous cortisol release/stress  -Limit Na correction to 6mEQ over 24 hours for now, should etiology have chronic component to preclude ODS  -Routine I/O

## 2021-05-23 NOTE — ED PROVIDER NOTE - OBJECTIVE STATEMENT
pt bib ems accompanied by a note and photo of a molst Contacts Marci Aida [phone] and More Aida [phone] daughters pmd dr Hoa Ardon, oncology Dr forte at [facility] [phone] hx of metastatic prostate ca to bones svt on amio, anemia sp transfusion, now not eating well losing weight, fell yesterday at home in the [place]. family refused ems pt still not able to ambulate he has marked weakness of his legs so ems called again to his home.  He requested [facility] but according to [facility] EMS medic they were on diversion.  other complaints listed include occasional confusion and frequent urination  never a  smoker no etoh no allergies

## 2021-05-23 NOTE — H&P ADULT - ASSESSMENT
81 M PMH prostate ca with bone mets, SVT (on amio), anemia (s/p transfusion), recent decrease PO intake/weightloss with presenting to ED with fall from home and weakness.  81 M PMH prostate ca with bone mets, SVT (on amio), anemia (s/p transfusion), decrease PO intake/weightloss with presenting to ED with fall from home and weakness.  81 M PMH prostate ca with bone mets, SVT (on amio), anemia (s/p transfusion), decrease PO intake/weightloss presenting to ED with increased weakness and inability to use walker with assistance. 81 M PMH prostate ca with bone mets, SVT (on amio), anemia (s/p transfusion), decrease PO intake/weightloss presenting to ED with increased weakness and inability to use walker with assistance. admitted for failure to thrive and falls with subdural hematoma

## 2021-05-23 NOTE — ED ADULT NURSE NOTE - OBJECTIVE STATEMENT
patient came in ED from home BIBA with cf/o right knee and leg pain s/p fall last night. patient denies LOC. denies dizziness. alert and oriented x 3. non-labored respiration noted. pale-looking. denies chest pain and discomfort. abdomen nondistended, nontender. bruise noted on the right flank area. on fall precaution. patient was able to move the right knee and leg but gently. ER MD at the bedside.

## 2021-05-23 NOTE — H&P ADULT - PROBLEM SELECTOR PLAN 1
-S/P recent fall (see below), unclear etiology, likely multifactorial 2/2 to current hyponatremia/metstatic prostate ca  -PT consult  -SW consult, as family denied EMS care originally -S/P recent fall (see below), unclear etiology, likely multifactorial 2/2 to current hyponatremia/metastatic prostate ca  -PT consult -Unwitnessed fall at home  -CT head/neck w/ Bilateral subdural hematomas  -No pharmacologic VTE ppx  -fall precautions  -f/u formal speech and swallow eval -Unwitnessed fall at home  -CT head/neck: Areas of bilateral subdural hemorrhage are present, acute on the right, and acute on chronic on the left. There is no associated midline shift. Widespread bony metastatic disease. No evidence for acute fracture. If symptoms persist, consider cervical spine MRI imaging follow-up. Widespread bony metastatic disease.  -No pharmacologic VTE ppx  -fall precautions  -Neuro checks q6  -further intervention pending GOC  -f/u formal speech and swallow eval -Unwitnessed fall at home  -CT head/neck: Areas of bilateral subdural hemorrhage are present, acute on the right, and acute on chronic on the left. There is no associated midline shift. Widespread bony metastatic disease. No evidence for acute fracture. If symptoms persist, consider cervical spine MRI imaging follow-up. Widespread bony metastatic disease.  -No pharmacologic VTE ppx  -fall precautions  -Neuro checks q6  -Neuro consult, Dr. Portillo  -further intervention pending GOC  -f/u formal speech and swallow eval -Unwitnessed fall at home  -CT head/neck: Areas of bilateral subdural hemorrhage are present, acute on the right, and acute on chronic on the left. There is no associated midline shift. Widespread bony metastatic disease. No evidence for acute fracture. If symptoms persist, consider cervical spine MRI imaging follow-up. Widespread bony metastatic disease.  -No pharmacologic VTE ppx  -fall precautions  -Neuro checks q6  -Neuro consult, Dr. Portillo  -further intervention pending GOC, apprec palliative collaboration-family wants to transition to LTC with hospice  -f/u formal speech and swallow eval

## 2021-05-23 NOTE — H&P ADULT - NSHPPHYSICALEXAM_GEN_ALL_CORE
Vital Signs Last 24 Hrs  T(C): 37.7 (23 May 2021 13:23), Max: 37.7 (23 May 2021 13:23)  T(F): 99.8 (23 May 2021 13:23), Max: 99.8 (23 May 2021 13:23)  HR: 82 (23 May 2021 13:23) (82 - 88)  BP: 159/69 (23 May 2021 13:23) (121/70 - 159/69)  RR: 16 (23 May 2021 13:23) (16 - 16)  SpO2: 99% (23 May 2021 13:23) (99% - 99%) T(C): 37.7 (05-23-21 @ 13:23), Max: 37.7 (05-23-21 @ 13:23)  HR: 82 (05-23-21 @ 13:23) (82 - 88)  BP: 159/69 (05-23-21 @ 13:23) (121/70 - 159/69)  RR: 16 (05-23-21 @ 13:23) (16 - 16)  SpO2: 99% (05-23-21 @ 13:23) (99% - 99%)    GENERAL: patient appears frail, no acute distress, appropriate, pleasant  EYES: sclera clear, no exudates  ENMT: oropharynx clear without erythema, no exudates, dry mucous membranes  NECK: supple, soft  LUNGS: diminished symmetric breath sounds, no wheezing or rhonchi appreciated  HEART: soft S1/S2, regular rate and rhythm, no murmurs noted, no lower extremity edema  GASTROINTESTINAL: abdomen is soft, nontender, nondistended, normoactive bowel sounds  : no suprapubic tenderness, so CVA tenderness  INTEGUMENT: warm, well-perfused, good skin turgor. R flank bruise  MUSCULOSKELETAL: no clubbing or cyanosis, no obvious deformity  NEUROLOGIC: awake, alert, oriented x2 (baseline), good muscle tone in 4 extremities, no obvious sensory deficits  PSYCHIATRIC: mood is good  HEME/LYMPH: no palpable supraclavicular nodules, no obvious ecchymosis or petechiae T(C): 37.7 (05-23-21 @ 13:23), Max: 37.7 (05-23-21 @ 13:23)  HR: 82 (05-23-21 @ 13:23) (82 - 88)  BP: 159/69 (05-23-21 @ 13:23) (121/70 - 159/69)  RR: 16 (05-23-21 @ 13:23) (16 - 16)  SpO2: 99% (05-23-21 @ 13:23) (99% - 99%)    GENERAL: patient appears frail, no acute distress, appropriate, pleasant  EYES: sclera clear, no exudates  ENMT: oropharynx clear without erythema, no exudates, dry mucous membranes  NECK: supple, soft  LUNGS: diminished symmetric breath sounds, no wheezing or rhonchi appreciated  HEART: soft S1/S2, regular rate and rhythm, no murmurs noted, no lower extremity edema  GASTROINTESTINAL: abdomen is soft, nontender, nondistended, normoactive bowel sounds  : no suprapubic tenderness, so CVA tenderness  INTEGUMENT: warm, well-perfused, good skin turgor. R flank bruise  MUSCULOSKELETAL: no clubbing or cyanosis, no obvious deformity  NEUROLOGIC: awake, alert, oriented x2 to person and grossly to place (baseline), good muscle tone in 4 extremities, no obvious sensory deficits  PSYCHIATRIC: mood is good  HEME/LYMPH: no palpable supraclavicular nodules, no obvious ecchymosis or petechiae

## 2021-05-23 NOTE — ED PROVIDER NOTE - CLINICAL SUMMARY MEDICAL DECISION MAKING FREE TEXT BOX
metastatic prostate cancer weak falling hx of anemia not eating ro anemia ro covid ro sepsis  ro metabolic derangement   iv fluds xray ekg type and screen xray cardiac monitor

## 2021-05-23 NOTE — H&P ADULT - PROBLEM SELECTOR PLAN 2
Likely acute given hx of recent decrease PO intake, Na 129 with glu 180  -Likely hypovolemic hyponatremia  -S/P 1.5 L NS bolus  -Check serum/urine osms  -May have underlying component of SIADH given recent fall/trauma and endogenous cortisol release/stress  -Limit Na correction to 6mEQ over 24 hours for now, should etiology have chronic component to preclude ODS  -Routine I/O -S/P recent fall (see below), unclear etiology, likely multifactorial 2/2 to current hyponatremia/metastatic prostate ca  -PT consult  -Nutrition consult  -Continue ensure supplements

## 2021-05-23 NOTE — H&P ADULT - NSHPREVIEWOFSYSTEMS_GEN_ALL_CORE
Constitutional: denies fever, chills, diaphoresis   HEENT: denies blurry vision, double vision, eye pain, difficulty hearing  Respiratory: denies SOB, cough, sputum production  Cardiovascular: denies CP, palpitations, edema  Gastrointestinal: denies nausea, vomiting, diarrhea, constipation, abdominal pain  Genitourinary: denies dysuria, frequency, urgency, hematuria   Skin/Breast: denies rash, itching  Neurologic: denies headache, weakness, dizziness, paresthesias, numbness/tingling  Musculoskeletal: admits weakness

## 2021-05-23 NOTE — H&P ADULT - NSHPSOURCEINFOTX_GEN_ALL_CORE
[facility] [phone], More Dixon [phone] Marci Parra [phone], Dr. More Parra [phone]

## 2021-05-23 NOTE — ED PROVIDER NOTE - ENMT, MLM
Airway patent, Nasal mucosa clear. Mouth with pale dry mucosa. Throat has no vesicles, no oropharyngeal exudates and uvula is midline. no gf r

## 2021-05-23 NOTE — H&P ADULT - PROBLEM SELECTOR PLAN 4
-EKG NSR, no acute ischemic changes  -Appears NSR in ED  -Continue home dose amio  -AST mild elevation, ALT wnl, cont to monitor -Patient with hx of stage 4 prostatic cancer with METS  -Palliative care consult for formal discussion of GOC at this time  -MOLST in Chart, DNR/DNI  -Patient denies current pain, will escalate as needed

## 2021-05-23 NOTE — PATIENT PROFILE ADULT - FLU SEASON?
Department of Anesthesiology  Preprocedure Note       Name:  Samuel Hensley   Age:  69 y.o.  :  1950                                          MRN:  [id number]         Date:  2020      Surgeon: Darrel Mcknight):  Shannon Landis MD    Procedure: Procedure(s):  EGD ESOPHAGOGASTRODUODENOSCOPY    Medications prior to admission:   Prior to Admission medications    Medication Sig Start Date End Date Taking?  Authorizing Provider   mirabegron (MYRBETRIQ) 50 MG TB24 Take 50 mg by mouth 2 times daily   Yes Historical Provider, MD   prednisoLONE acetate (PRED FORTE) 1 % ophthalmic suspension Place 4 drops into the right eye 4 times daily   Yes Historical Provider, MD   albuterol sulfate  (90 Base) MCG/ACT inhaler Inhale 2 puffs into the lungs every 6 hours as needed for Wheezing 20  Yes Tabby Garza DO   insulin lispro, 1 Unit Dial, (HUMALOG KWIKPEN) 100 UNIT/ML SOPN Inject 10-26 units three times daily per sliding scale 20  Yes Trent Nina MD   tolterodine (DETROL) 2 MG tablet Take 2 mg by mouth 2 times daily Indications: NOT CURRENTLY TAKING    Yes Historical Provider, MD   varenicline (CHANTIX) 1 MG tablet Take 1 tablet by mouth 2 times daily 20  Yes Trent Nina MD   clopidogrel (PLAVIX) 75 MG tablet TAKE 1 TABLET BY MOUTH ONCE DAILY 6/10/20  Yes Historical Provider, MD   simvastatin (ZOCOR) 40 MG tablet TAKE 1 TABLET BY MOUTH ONCE DAILY IN THE EVENING 20  Yes Trent Nina MD   aspirin 81 MG tablet Take 81 mg by mouth daily   Yes Historical Provider, MD   insulin lispro (HUMALOG) 100 UNIT/ML injection vial Inject 10 Units into the skin 3 times daily (before meals) Sliding scale at hs   Yes Historical Provider, MD   esomeprazole (NEXIUM) 40 MG delayed release capsule Take 1 capsule by mouth every morning (before breakfast) 20  Yes Trent Nina MD   insulin glargine (LANTUS SOLOSTAR) 100 UNIT/ML injection pen Inject 65 Units into the skin nightly 2/28/20  Yes Rc Montalvo MD   albuterol (PROVENTIL) (2.5 MG/3ML) 0.083% nebulizer solution USE 1 VIAL IN NEBULIZER TWICE DAILY 2/28/20  Yes Rc Montalvo MD   Continuous Blood Gluc  (FREESTYLE ALPHONSE 15 DAY READER) ALBER Dx: DM2 5/14/20   Rc Montalvo MD   Continuous Blood Gluc Sensor (FREESTYLE ALPHONSE 14 DAY SENSOR) MISC Apply to skin every 14 days 5/14/20   Rc Montalvo MD   blood glucose monitor strips Test 4 times a day & as needed for symptoms of irregular blood glucose. 3/6/20   Rc Montalvo MD   linaclotide (LINZESS) 290 MCG CAPS capsule Take 1 capsule by mouth every morning (before breakfast)  Patient taking differently: Take 290 mcg by mouth every morning (before breakfast) Indications: PT NOT CURRENTLY TAKING  2/28/20   Rc Montalvo MD   gabapentin (NEURONTIN) 300 MG capsule Take 1 capsule by mouth 3 times daily for 90 days. 2/28/20 7/21/20  Rc Montalvo MD       Current medications:    No current facility-administered medications for this encounter. Allergies:     Allergies   Allergen Reactions    Cymbalta [Duloxetine Hcl]      States she gets slurred speech, and stroke like symptoms       Problem List:    Patient Active Problem List   Diagnosis Code    Uncontrolled type 2 diabetes mellitus with hypoglycemia without coma ([facility] [address] E11.649    Mixed hyperlipidemia E78.2    Chronic obstructive pulmonary disease ([facility] [address] J44.9       Past Medical History:        Diagnosis Date    Asthma     Cerebrovascular disease     Chronic back pain     COPD (chronic obstructive pulmonary disease) ([address]     Diabetes mellitus ([address]     Emphysema of lung ([facility] [address]     Hearing loss     Hyperlipidemia     Hypertension     Neuropathy     Peripheral vascular disease ([address]     Urinary incontinence        Past Surgical History:        Procedure Laterality Date    APPENDECTOMY      CHOLECYSTECTOMY      TONSILLECTOMY      VASCULAR SURGERY      STENTS X2 LLE       Social History:    Social History     Tobacco Use    Smoking status: Former Smoker     Packs/day: 0.25     Years: 50.00     Pack years: 12.50     Types: Cigarettes     Start date: 1952     Last attempt to quit: 3/28/2020     Years since quittin.4    Smokeless tobacco: Never Used   Substance Use Topics    Alcohol use: Not Currently     Frequency: Never                                Counseling given: Not Answered      Vital Signs (Current):   Vitals:    20 0933 20 1032   BP:  (!) 150/72   Pulse:  91   Resp:  22   Temp:  96.8 °F (36 °C)   TempSrc:  Temporal   SpO2:  95%   Weight: 231 lb (104.8 kg) 231 lb (104.8 kg)   Height: 5' 4\" (1.626 m) 5' 4\" (1.626 m)                                              BP Readings from Last 3 Encounters:   20 (!) 150/72   20 (!) 145/78   20 138/72       NPO Status: Time of last liquid consumption:                         Time of last solid consumption:                         Date of last liquid consumption: 20                        Date of last solid food consumption: 20    BMI:   Wt Readings from Last 3 Encounters:   20 231 lb (104.8 kg)   20 236 lb (107 kg)   20 245 lb (111.1 kg)     Body mass index is 39.65 kg/m².     CBC:   Lab Results   Component Value Date    WBC 9.3 2020    RBC 4.83 2020    HGB 12.8 2020    HCT 40.6 2020    MCV 84.1 2020    RDW 14.8 2020     2020       CMP:   Lab Results   Component Value Date     2020    K 4.3 2020     2020    CO2 28 2020    BUN 20 2020    CREATININE 1.19 2020    GFRAA 54 2020    LABGLOM 45 2020    GLUCOSE 185 2020    PROT 7.7 2020    CALCIUM 9.1 2020    BILITOT 0.3 2020    ALKPHOS 99 2020    AST 14 2020    ALT 17 2020       POC Tests: No results for input(s): POCGLU, POCNA, POCK, POCCL, POCBUN, POCHEMO, POCHCT in the last 72 hours. Coags:   Lab Results   Component Value Date    PROTIME 10.4 09/04/2020    INR 0.9 09/04/2020    APTT 25.1 09/04/2020       HCG (If Applicable): No results found for: PREGTESTUR, PREGSERUM, HCG, HCGQUANT     ABGs: No results found for: PHART, PO2ART, KVW9EIM, CKS4UAF, BEART, E3ZWCCGA     Type & Screen (If Applicable):  No results found for: LABABO, LABRH    Drug/Infectious Status (If Applicable):  No results found for: HIV, HEPCAB    COVID-19 Screening (If Applicable):   Lab Results   Component Value Date    COVID19 Not Detected 09/16/2020         Anesthesia Evaluation  Patient summary reviewed no history of anesthetic complications:   Airway: Mallampati: III  TM distance: >3 FB   Neck ROM: full  Mouth opening: > = 3 FB Dental:    (+) upper dentures and edentulous      Pulmonary: breath sounds clear to auscultation  (+) COPD:  asthma:                           ROS comment: Former smoker    Cardiovascular:    (+) hypertension:, hyperlipidemia        Rhythm: regular             Beta Blocker:  Not on Beta Blocker         Neuro/Psych:   (+) CVA:, neuromuscular disease (chronic back pain):,              ROS comment: Chronic back pain  Peripheral neuropathy GI/Hepatic/Renal:   (+) GERD:, renal disease (urniary incontinence):,          ROS comment: Dysphagia . Endo/Other:    (+) DiabetesType II DM, using insulin, . Abdominal:           Vascular:   + PVD, aortic or cerebral, . Anesthesia Plan      MAC     ASA 3       Induction: intravenous. Anesthetic plan and risks discussed with patient. Plan discussed with CRNA.             [address] Mynor Pereira DO   9/21/2020 No

## 2021-05-23 NOTE — H&P ADULT - PROBLEM SELECTOR PLAN 6
-Nutrition eval  -Formal speech and swallow assessment  -Aspiration precautions  -Pureed with honey thick, ensure TID -Nutrition eval  -Formal speech and swallow assessment  -Aspiration precautions  -Pureed with honey thick for now. Ensure TID -Nutrition eval  -Formal speech and swallow assessment  -Aspiration precautions  -Pureed with honey thick for now. Ensure TID  -SW consult. Family requesting long term car facility with hospice unit at this time, Per daughter, Dr. More Parra.

## 2021-05-23 NOTE — ED PROVIDER NOTE - SKIN, MLM
Skin pale color for race, warm, dry and intact. No evidence of rash. Skin pale color for race, warm, dry and intact. pt has bruise r post lower ribs flank

## 2021-05-23 NOTE — H&P ADULT - NSHPSOCIALHISTORY_GEN_ALL_CORE
Tobacco:  Etoh:  Drug use:  Lives with:  Ambulates with:  COVID:  Other vaccines: Tobacco: none  Etoh: none  Drug use: none  Lives with: daughterMarci [phone]  Ambulates with: walker and assistance  COVID: NOT VACCINATED

## 2021-05-23 NOTE — ED PROVIDER NOTE - CONSTITUTIONAL, MLM
normal... thin chronically ill male who is awake and alert no acute distress speaks softly appears pale

## 2021-05-23 NOTE — H&P ADULT - PROBLEM SELECTOR PLAN 5
-Unwitnessed fall at home  -Obtain CT head/neck -EKG NSR, no acute ischemic changes  -Appears NSR in ED  -Continue home dose amio  -AST mild elevation, ALT wnl, cont to monitor

## 2021-05-23 NOTE — H&P ADULT - ATTENDING COMMENTS
81 M PMH prostate ca with bone mets, SVT (on amio), anemia (s/p transfusion), decrease PO intake/weightloss presenting to ED with increased weakness and inability to use walker with assistance. admitted for failure to thrive and falls with subdural hematoma. Plan: apprec neuro recs, monitor clinical course, apprec paliative and sw collaboration for advance directives and goals of care daughter/hcp who is also a physician states family would like to place for long term care given advanced disease and unabel to care for patient at hime due to falls with option of hospice

## 2021-05-23 NOTE — ED PROVIDER NOTE - MUSCULOSKELETAL, MLM
Spine appears normal, range of motion is not limited, no muscle or joint tenderness diffuse muscle atrophy and weakness unable to sit up due to pain in back and very weak arms and legs

## 2021-05-24 ENCOUNTER — TRANSCRIPTION ENCOUNTER (OUTPATIENT)
Age: 81
End: 2021-05-24

## 2021-05-24 DIAGNOSIS — D64.9 ANEMIA, UNSPECIFIED: ICD-10-CM

## 2021-05-24 LAB
A1C WITH ESTIMATED AVERAGE GLUCOSE RESULT: 6.4 % — HIGH (ref 4–5.6)
ALBUMIN SERPL ELPH-MCNC: 2 G/DL — LOW (ref 3.3–5)
ALP SERPL-CCNC: 544 U/L — HIGH (ref 40–120)
ALT FLD-CCNC: 36 U/L — SIGNIFICANT CHANGE UP (ref 12–78)
ANION GAP SERPL CALC-SCNC: 9 MMOL/L — SIGNIFICANT CHANGE UP (ref 5–17)
AST SERPL-CCNC: 43 U/L — HIGH (ref 15–37)
BASOPHILS # BLD AUTO: 0 K/UL — SIGNIFICANT CHANGE UP (ref 0–0.2)
BASOPHILS NFR BLD AUTO: 0 % — SIGNIFICANT CHANGE UP (ref 0–2)
BILIRUB SERPL-MCNC: 0.6 MG/DL — SIGNIFICANT CHANGE UP (ref 0.2–1.2)
BUN SERPL-MCNC: 11 MG/DL — SIGNIFICANT CHANGE UP (ref 7–23)
CALCIUM SERPL-MCNC: 7.2 MG/DL — LOW (ref 8.5–10.1)
CHLORIDE SERPL-SCNC: 102 MMOL/L — SIGNIFICANT CHANGE UP (ref 96–108)
CO2 SERPL-SCNC: 23 MMOL/L — SIGNIFICANT CHANGE UP (ref 22–31)
COVID-19 SPIKE DOMAIN AB INTERP: NEGATIVE — SIGNIFICANT CHANGE UP
COVID-19 SPIKE DOMAIN ANTIBODY RESULT: 0.4 U/ML — SIGNIFICANT CHANGE UP
CREAT ?TM UR-MCNC: 27 MG/DL — SIGNIFICANT CHANGE UP
CREAT SERPL-MCNC: 0.25 MG/DL — LOW (ref 0.5–1.3)
CULTURE RESULTS: SIGNIFICANT CHANGE UP
EOSINOPHIL # BLD AUTO: 0 K/UL — SIGNIFICANT CHANGE UP (ref 0–0.5)
EOSINOPHIL NFR BLD AUTO: 0 % — SIGNIFICANT CHANGE UP (ref 0–6)
ESTIMATED AVERAGE GLUCOSE: 137 MG/DL — HIGH (ref 68–114)
GLUCOSE SERPL-MCNC: 121 MG/DL — HIGH (ref 70–99)
HCT VFR BLD CALC: 24.6 % — LOW (ref 39–50)
HGB BLD-MCNC: 7.7 G/DL — LOW (ref 13–17)
LYMPHOCYTES # BLD AUTO: 0.52 K/UL — LOW (ref 1–3.3)
LYMPHOCYTES # BLD AUTO: 12 % — LOW (ref 13–44)
MCHC RBC-ENTMCNC: 28.1 PG — SIGNIFICANT CHANGE UP (ref 27–34)
MCHC RBC-ENTMCNC: 31.3 GM/DL — LOW (ref 32–36)
MCV RBC AUTO: 89.8 FL — SIGNIFICANT CHANGE UP (ref 80–100)
MONOCYTES # BLD AUTO: 0.48 K/UL — SIGNIFICANT CHANGE UP (ref 0–0.9)
MONOCYTES NFR BLD AUTO: 11 % — SIGNIFICANT CHANGE UP (ref 2–14)
NEUTROPHILS # BLD AUTO: 3.33 K/UL — SIGNIFICANT CHANGE UP (ref 1.8–7.4)
NEUTROPHILS NFR BLD AUTO: 77 % — SIGNIFICANT CHANGE UP (ref 43–77)
NRBC # BLD: SIGNIFICANT CHANGE UP /100 WBCS (ref 0–0)
OSMOLALITY SERPL: 269 MOSMOL/KG — LOW (ref 280–301)
PLATELET # BLD AUTO: 253 K/UL — SIGNIFICANT CHANGE UP (ref 150–400)
POTASSIUM SERPL-MCNC: 3.6 MMOL/L — SIGNIFICANT CHANGE UP (ref 3.5–5.3)
POTASSIUM SERPL-SCNC: 3.6 MMOL/L — SIGNIFICANT CHANGE UP (ref 3.5–5.3)
PROT SERPL-MCNC: 5.8 G/DL — LOW (ref 6–8.3)
RBC # BLD: 2.74 M/UL — LOW (ref 4.2–5.8)
RBC # FLD: 18.7 % — HIGH (ref 10.3–14.5)
SARS-COV-2 IGG+IGM SERPL QL IA: 0.4 U/ML — SIGNIFICANT CHANGE UP
SARS-COV-2 IGG+IGM SERPL QL IA: NEGATIVE — SIGNIFICANT CHANGE UP
SODIUM SERPL-SCNC: 134 MMOL/L — LOW (ref 135–145)
SODIUM UR-SCNC: 53 MMOL/L — SIGNIFICANT CHANGE UP
SPECIMEN SOURCE: SIGNIFICANT CHANGE UP
WBC # BLD: 4.33 K/UL — SIGNIFICANT CHANGE UP (ref 3.8–10.5)
WBC # FLD AUTO: 4.33 K/UL — SIGNIFICANT CHANGE UP (ref 3.8–10.5)

## 2021-05-24 PROCEDURE — 99232 SBSQ HOSP IP/OBS MODERATE 35: CPT | Mod: GC

## 2021-05-24 RX ORDER — POLYETHYLENE GLYCOL 3350 17 G/17G
17 POWDER, FOR SOLUTION ORAL DAILY
Refills: 0 | Status: DISCONTINUED | OUTPATIENT
Start: 2021-05-24 | End: 2021-05-29

## 2021-05-24 RX ORDER — POLYETHYLENE GLYCOL 3350 17 G/17G
1 POWDER, FOR SOLUTION ORAL
Qty: 0 | Refills: 0 | DISCHARGE

## 2021-05-24 RX ORDER — AMIODARONE HYDROCHLORIDE 400 MG/1
200 TABLET ORAL DAILY
Refills: 0 | Status: DISCONTINUED | OUTPATIENT
Start: 2021-05-24 | End: 2021-05-29

## 2021-05-24 RX ADMIN — AMIODARONE HYDROCHLORIDE 200 MILLIGRAM(S): 400 TABLET ORAL at 08:50

## 2021-05-24 NOTE — PHYSICAL THERAPY INITIAL EVALUATION ADULT - GAIT TRAINING, PT EVAL
Patient will ambulate x 25 feet with RW with mod  A x 1 in 2 weeks in order to increase mobility at home

## 2021-05-24 NOTE — DISCHARGE NOTE PROVIDER - CARE PROVIDER_API CALL
Earle Dougherty  HEMATOLOGY  [address], [address]  [place] [address]  Phone: [phone]  Fax: [phone]  Follow Up Time: 1 week

## 2021-05-24 NOTE — PROGRESS NOTE ADULT - SUBJECTIVE AND OBJECTIVE BOX
Patient is a 81y old  Male who presents with a chief complaint of Fall (23 May 2021 14:21)      INTERVAL HPI/OVERNIGHT EVENTS:    MEDICATIONS  (STANDING):  aMIOdarone    Tablet 200 milliGRAM(s) Oral daily  sodium chloride 0.9%. 1000 milliLiter(s) (60 mL/Hr) IV Continuous <Continuous>    MEDICATIONS  (PRN):      Allergies    No Known Allergies    Intolerances        REVIEW OF SYSTEMS:  CONSTITUTIONAL: No fever or chills  HEENT:  No headache, no sore throat  RESPIRATORY: No cough, wheezing, or shortness of breath  CARDIOVASCULAR: No chest pain, palpitations  GASTROINTESTINAL: No abd pain, nausea, vomiting, or diarrhea  GENITOURINARY: No dysuria, frequency, or hematuria  NEUROLOGICAL: no focal weakness or dizziness  MUSCULOSKELETAL: no myalgias     Vital Signs Last 24 Hrs  T(C): 36.7 (24 May 2021 04:56), Max: 37.7 (23 May 2021 13:23)  T(F): 98.1 (24 May 2021 04:56), Max: 99.8 (23 May 2021 13:23)  HR: 67 (24 May 2021 08:52) (67 - 90)  BP: 125/64 (24 May 2021 08:52) (121/70 - 159/69)  BP(mean): --  RR: 17 (24 May 2021 04:56) (16 - 18)  SpO2: 98% (24 May 2021 04:56) (96% - 99%)    PHYSICAL EXAM:  GENERAL: NAD  HEENT:  anicteric, moist mucous membranes  CHEST/LUNG:  CTA b/l, no rales, wheezes, or rhonchi  HEART:  RRR, S1, S2  ABDOMEN:  BS+, soft, nontender, nondistended  EXTREMITIES: no edema, cyanosis, or calf tenderness  NERVOUS SYSTEM: answers questions and follows commands appropriately    LABS:                        7.7    4.33  )-----------( 253      ( 24 May 2021 08:28 )             24.6     CBC Full  -  ( 24 May 2021 08:28 )  WBC Count : 4.33 K/uL  Hemoglobin : 7.7 g/dL  Hematocrit : 24.6 %  Platelet Count - Automated : 253 K/uL  Mean Cell Volume : 89.8 fl  Mean Cell Hemoglobin : 28.1 pg  Mean Cell Hemoglobin Concentration : 31.3 gm/dL  Auto Neutrophil # : x  Auto Lymphocyte # : x  Auto Monocyte # : x  Auto Eosinophil # : x  Auto Basophil # : x  Auto Neutrophil % : x  Auto Lymphocyte % : x  Auto Monocyte % : x  Auto Eosinophil % : x  Auto Basophil % : x    24 May 2021 08:28    134    |  102    |  11     ----------------------------<  121    3.6     |  23     |  0.25     Ca    7.2        24 May 2021 08:28    TPro  5.8    /  Alb  2.0    /  TBili  0.6    /  DBili  x      /  AST  43     /  ALT  36     /  AlkPhos  544    24 May 2021 08:28    PT/INR - ( 23 May 2021 13:02 )   PT: 15.3 sec;   INR: 1.33 ratio         PTT - ( 23 May 2021 13:02 )  PTT:30.0 sec  Urinalysis Basic - ( 23 May 2021 13:15 )    Color: Yellow / Appearance: Clear / S.015 / pH: x  Gluc: x / Ketone: Moderate  / Bili: Negative / Urobili: 4   Blood: x / Protein: 30 mg/dL / Nitrite: Negative   Leuk Esterase: Trace / RBC: 0-2 /HPF / WBC 0-2   Sq Epi: x / Non Sq Epi: Occasional / Bacteria: Occasional      CAPILLARY BLOOD GLUCOSE      POCT Blood Glucose.: 166 mg/dL (23 May 2021 13:34)          RADIOLOGY & ADDITIONAL TESTS:    Personally reviewed.     Consultant(s) Notes Reviewed:  [x] YES  [ ] NO     Patient is a 81y old Male with PMH of prostate ca with bone mets, SVT, and anemia, presents with a chief complaint of Fall (23 May 2021 14:21)      INTERVAL HPI/OVERNIGHT EVENTS: No acute overnight events. Patient seen and examined this morning bedside, appeared comfortable. Patient states he has b/l leg pain, possible due to the fall, he has no recollection of the fall, no loss of consciousness. Denies CP, dyspnea, n/v/d, dizziness, headache, and abdominal pain.    MEDICATIONS  (STANDING):  aMIOdarone    Tablet 200 milliGRAM(s) Oral daily  sodium chloride 0.9%. 1000 milliLiter(s) (60 mL/Hr) IV Continuous <Continuous>    MEDICATIONS  (PRN):      Allergies    No Known Allergies    Intolerances        REVIEW OF SYSTEMS:  CONSTITUTIONAL: No fever or chills  HEENT:  No headache, no sore throat  RESPIRATORY: No cough, wheezing, or shortness of breath  CARDIOVASCULAR: No chest pain, palpitations  GASTROINTESTINAL: No abd pain, nausea, vomiting, or diarrhea  GENITOURINARY: No dysuria, frequency, or hematuria  NEUROLOGICAL: no headache, weakness, dizziness, paresthesias, numbness/tingling  MUSCULOSKELETAL: Admits to b/l leg pain    Vital Signs Last 24 Hrs  T(C): 36.7 (24 May 2021 04:56), Max: 37.7 (23 May 2021 13:23)  T(F): 98.1 (24 May 2021 04:56), Max: 99.8 (23 May 2021 13:23)  HR: 67 (24 May 2021 08:52) (67 - 90)  BP: 125/64 (24 May 2021 08:52) (121/70 - 159/69)  BP(mean): --  RR: 17 (24 May 2021 04:56) (16 - 18)  SpO2: 98% (24 May 2021 04:56) (96% - 99%)    PHYSICAL EXAM:  GENERAL: NAD, appears weak, alert, interactive  HEENT:  anicteric, moist mucous membranes  CHEST/LUNG:  CTA b/l, decreased breath sounds b/l, no rales, wheezes, or rhonchi  HEART:  RRR, S1, S2, no murmurs  ABDOMEN:  BS+, soft, nontender, nondistended  EXTREMITIES: no edema, cyanosis, or calf tenderness  NEURO: wake, alert, oriented x2 to person and grossly to place (baseline), Motor bilateral upper 4/5, bilateral lower 3-/5, slightly increased tone in bilateral lower extremities  SKIN: warm, dry, right flank bruise      LABS:                        7.7    4.33  )-----------( 253      ( 24 May 2021 08:28 )             24.6     CBC Full  -  ( 24 May 2021 08:28 )  WBC Count : 4.33 K/uL  Hemoglobin : 7.7 g/dL  Hematocrit : 24.6 %  Platelet Count - Automated : 253 K/uL  Mean Cell Volume : 89.8 fl  Mean Cell Hemoglobin : 28.1 pg  Mean Cell Hemoglobin Concentration : 31.3 gm/dL  Auto Neutrophil # : x  Auto Lymphocyte # : x  Auto Monocyte # : x  Auto Eosinophil # : x  Auto Basophil # : x  Auto Neutrophil % : x  Auto Lymphocyte % : x  Auto Monocyte % : x  Auto Eosinophil % : x  Auto Basophil % : x    24 May 2021 08:28    134    |  102    |  11     ----------------------------<  121    3.6     |  23     |  0.25     Ca    7.2        24 May 2021 08:28    TPro  5.8    /  Alb  2.0    /  TBili  0.6    /  DBili  x      /  AST  43     /  ALT  36     /  AlkPhos  544    24 May 2021 08:28    PT/INR - ( 23 May 2021 13:02 )   PT: 15.3 sec;   INR: 1.33 ratio         PTT - ( 23 May 2021 13:02 )  PTT:30.0 sec  Urinalysis Basic - ( 23 May 2021 13:15 )    Color: Yellow / Appearance: Clear / S.015 / pH: x  Gluc: x / Ketone: Moderate  / Bili: Negative / Urobili: 4   Blood: x / Protein: 30 mg/dL / Nitrite: Negative   Leuk Esterase: Trace / RBC: 0-2 /HPF / WBC 0-2   Sq Epi: x / Non Sq Epi: Occasional / Bacteria: Occasional      CAPILLARY BLOOD GLUCOSE      POCT Blood Glucose.: 166 mg/dL (23 May 2021 13:34)          RADIOLOGY & ADDITIONAL TESTS:  < from: CT Cervical Spine No Cont (21 @ 15:25) >  EXAM:  CT CERVICAL SPINE                          EXAM:  CT BRAIN                            PROCEDURE DATE:  2021          INTERPRETATION:  HISTORY: Status post fall with head and cervical spine trauma. Weakness. R53.1. History of metastaticprostate cancer to the bones.    Description: A noncontrast head CT and a noncontrast cervical spine CT were performed. The head CT was performed with axial images with coronal and sagittal reformats. The cervical spine CT was performed with axial thin section images with sagittal and coronal reformatted series.    No prior head or cervical spine imaging study was available for comparison at this Medical Center.    Head CT:    Areas of acute right frontal parietal temporal subdural hemorrhage arepresent, measuring up to 0.8 cm greatest transverse thickness in the right frontal region.    Low density subdural fluid is noted in the left frontal region measuring up to 1.2 cm in greatest transverse diameter. Small areas of acute subdural hemorrhage are noted in the left frontal temporal region. These findings are consistent with an acute on chronic subdural hematoma.    There is no associated midline shift. There is no evidence for acute infarct or brain parenchymal hemorrhage.    Mild hypodensity is present in the periventricular and subcortical white matter which most likely represents chronic microvascular ischemic changes given the patient's age.    Cerebral volume loss is noted with secondary proportional prominence of the ventricles and sulci.    Moderate mucosal thickening is partially visualized in the paranasal sinuses. A small air-fluid level involves the left frontal sinus. Correlate for possible sinusitis.    Moderate opacification of the bilateral mastoid air cells and middle ear cavities is noted. Correlate for mastoid and middle ear effusions versus underlying infection.    The bones are diffusely dense most consistent with metastatic prostate cancer given the patient's clinical history.    Cervical spine CT:    The bones of the cervical spine are diffusely dense most consistent with bony metastatic disease given the patient's known history of prostate cancer.    There is no evidence for acute fracture, subluxation, or prevertebral hematoma.    Disc bulges, disc osteophyte complexes, facet degenerative changes, and ligamentum flavum hypertrophy are present. The associated degree of spinal canal stenosis and neural foraminal narrowing is not well evaluated with CT technique.    I discussed the exam findings with Dr. Taylor at 3:50 PM on 2021 with read back.    IMPRESSION:    Head CT:    Areas of bilateral subdural hemorrhage are present, acute on the right, and acute on chronic on the left. There is no associated midline shift.    Widespread bony metastatic disease.    Cervical spine CT:    No evidence for acute fracture. If symptoms persist, consider cervical spine MRI imaging follow-up.    Widespread bony metastatic disease.              TANIKA FREITAS MD; Attending Radiologist  This document has been electronically signed. May 23 2021  3:52PM    < end of copied text >      Personally reviewed.     Consultant(s) Notes Reviewed:  [x] YES  [ ] NO     Patient is a 81y old Male with PMH of prostate ca with bone mets, SVT, and anemia, presents with a chief complaint of Fall (23 May 2021 14:21)      INTERVAL HPI/OVERNIGHT EVENTS: No acute overnight events. Patient seen and examined this morning bedside, appeared comfortable. Patient states he has b/l leg pain, possible due to the fall, he has no recollection of the fall, no loss of consciousness. Denies CP, dyspnea, n/v/d, dizziness, headache, and abdominal pain.    Spoke to daughter Marci over the phone who said patient fell a week prior as well. He has not complained of any headaches from either fall. He has had difficulty ambulating and pain in his legs since falling.     MEDICATIONS  (STANDING):  aMIOdarone    Tablet 200 milliGRAM(s) Oral daily  sodium chloride 0.9%. 1000 milliLiter(s) (60 mL/Hr) IV Continuous <Continuous>    MEDICATIONS  (PRN):      Allergies    No Known Allergies    Intolerances        REVIEW OF SYSTEMS:  CONSTITUTIONAL: No fever or chills  HEENT:  No headache, no sore throat  RESPIRATORY: No cough, wheezing, or shortness of breath  CARDIOVASCULAR: No chest pain, palpitations  GASTROINTESTINAL: No abd pain, nausea, vomiting, or diarrhea  GENITOURINARY: No dysuria, frequency, or hematuria  NEUROLOGICAL: no headache, weakness, dizziness, paresthesias, numbness/tingling  MUSCULOSKELETAL: Admits to b/l leg pain    Vital Signs Last 24 Hrs  T(C): 36.7 (24 May 2021 04:56), Max: 37.7 (23 May 2021 13:23)  T(F): 98.1 (24 May 2021 04:56), Max: 99.8 (23 May 2021 13:23)  HR: 67 (24 May 2021 08:52) (67 - 90)  BP: 125/64 (24 May 2021 08:52) (121/70 - 159/69)  BP(mean): --  RR: 17 (24 May 2021 04:56) (16 - 18)  SpO2: 98% (24 May 2021 04:56) (96% - 99%)    PHYSICAL EXAM:  GENERAL: NAD, appears weak, alert, interactive  HEENT:  anicteric, moist mucous membranes  CHEST/LUNG:  CTA b/l, decreased breath sounds b/l, no rales, wheezes, or rhonchi  HEART:  RRR, S1, S2, no murmurs  ABDOMEN:  BS+, soft, nontender, nondistended  EXTREMITIES: no edema, cyanosis, or calf tenderness  NEURO: wake, alert, oriented x2 to person and grossly to place (baseline), Motor bilateral upper 4/5, bilateral lower 3-/5, slightly increased tone in bilateral lower extremities  SKIN: warm, dry    LABS:                        7.7    4.33  )-----------( 253      ( 24 May 2021 08:28 )             24.6     CBC Full  -  ( 24 May 2021 08:28 )  WBC Count : 4.33 K/uL  Hemoglobin : 7.7 g/dL  Hematocrit : 24.6 %  Platelet Count - Automated : 253 K/uL  Mean Cell Volume : 89.8 fl  Mean Cell Hemoglobin : 28.1 pg  Mean Cell Hemoglobin Concentration : 31.3 gm/dL  Auto Neutrophil # : x  Auto Lymphocyte # : x  Auto Monocyte # : x  Auto Eosinophil # : x  Auto Basophil # : x  Auto Neutrophil % : x  Auto Lymphocyte % : x  Auto Monocyte % : x  Auto Eosinophil % : x  Auto Basophil % : x    24 May 2021 08:28    134    |  102    |  11     ----------------------------<  121    3.6     |  23     |  0.25     Ca    7.2        24 May 2021 08:28    TPro  5.8    /  Alb  2.0    /  TBili  0.6    /  DBili  x      /  AST  43     /  ALT  36     /  AlkPhos  544    24 May 2021 08:28    PT/INR - ( 23 May 2021 13:02 )   PT: 15.3 sec;   INR: 1.33 ratio         PTT - ( 23 May 2021 13:02 )  PTT:30.0 sec  Urinalysis Basic - ( 23 May 2021 13:15 )    Color: Yellow / Appearance: Clear / S.015 / pH: x  Gluc: x / Ketone: Moderate  / Bili: Negative / Urobili: 4   Blood: x / Protein: 30 mg/dL / Nitrite: Negative   Leuk Esterase: Trace / RBC: 0-2 /HPF / WBC 0-2   Sq Epi: x / Non Sq Epi: Occasional / Bacteria: Occasional      CAPILLARY BLOOD GLUCOSE      POCT Blood Glucose.: 166 mg/dL (23 May 2021 13:34)          RADIOLOGY & ADDITIONAL TESTS:  < from: CT Cervical Spine No Cont (21 @ 15:25) >  EXAM:  CT CERVICAL SPINE                          EXAM:  CT BRAIN                            PROCEDURE DATE:  2021          INTERPRETATION:  HISTORY: Status post fall with head and cervical spine trauma. Weakness. R53.1. History of metastaticprostate cancer to the bones.    Description: A noncontrast head CT and a noncontrast cervical spine CT were performed. The head CT was performed with axial images with coronal and sagittal reformats. The cervical spine CT was performed with axial thin section images with sagittal and coronal reformatted series.    No prior head or cervical spine imaging study was available for comparison at this Medical Center.    Head CT:    Areas of acute right frontal parietal temporal subdural hemorrhage arepresent, measuring up to 0.8 cm greatest transverse thickness in the right frontal region.    Low density subdural fluid is noted in the left frontal region measuring up to 1.2 cm in greatest transverse diameter. Small areas of acute subdural hemorrhage are noted in the left frontal temporal region. These findings are consistent with an acute on chronic subdural hematoma.    There is no associated midline shift. There is no evidence for acute infarct or brain parenchymal hemorrhage.    Mild hypodensity is present in the periventricular and subcortical white matter which most likely represents chronic microvascular ischemic changes given the patient's age.    Cerebral volume loss is noted with secondary proportional prominence of the ventricles and sulci.    Moderate mucosal thickening is partially visualized in the paranasal sinuses. A small air-fluid level involves the left frontal sinus. Correlate for possible sinusitis.    Moderate opacification of the bilateral mastoid air cells and middle ear cavities is noted. Correlate for mastoid and middle ear effusions versus underlying infection.    The bones are diffusely dense most consistent with metastatic prostate cancer given the patient's clinical history.    Cervical spine CT:    The bones of the cervical spine are diffusely dense most consistent with bony metastatic disease given the patient's known history of prostate cancer.    There is no evidence for acute fracture, subluxation, or prevertebral hematoma.    Disc bulges, disc osteophyte complexes, facet degenerative changes, and ligamentum flavum hypertrophy are present. The associated degree of spinal canal stenosis and neural foraminal narrowing is not well evaluated with CT technique.    I discussed the exam findings with Dr. Taylor at 3:50 PM on 2021 with read back.    IMPRESSION:    Head CT:    Areas of bilateral subdural hemorrhage are present, acute on the right, and acute on chronic on the left. There is no associated midline shift.    Widespread bony metastatic disease.    Cervical spine CT:    No evidence for acute fracture. If symptoms persist, consider cervical spine MRI imaging follow-up.    Widespread bony metastatic disease.              TANIKA FREITAS MD; Attending Radiologist  This document has been electronically signed. May 23 2021  3:52PM    < end of copied text >      Personally reviewed.     Consultant(s) Notes Reviewed:  [x] YES  [ ] NO

## 2021-05-24 NOTE — DISCHARGE NOTE PROVIDER - DETAILS OF MALNUTRITION DIAGNOSIS/DIAGNOSES
This patient has been assessed with a concern for Malnutrition and was treated during this hospitalization for the following Nutrition diagnosis/diagnoses:     -  05/24/2021: Severe protein-calorie malnutrition   -  05/24/2021: Underweight (BMI < 19)

## 2021-05-24 NOTE — PROGRESS NOTE ADULT - PROBLEM SELECTOR PLAN 5
-Patient with hx of stage 4 prostatic cancer with METS  -Palliative care consult for formal discussion of GOC at this time  -MOLST in Chart, DNR/DNI  -Patient denies current pain, will escalate as needed -Patient with hx of stage 4 prostatic cancer with METS  -Palliative care consult for formal discussion of GOC at this time  -MOLST in Chart, DNR/DNI -Patient with hx of stage 4 prostatic cancer with METS  -MOLST in Chart, DNR/DNI

## 2021-05-24 NOTE — DISCHARGE NOTE PROVIDER - HOSPITAL COURSE
FROM ADMISSION H+P:   HPI:  81 M PMH prostate ca with bone mets, SVT (on amio), anemia (s/p transfusion), decrease PO intake/weightloss presenting to ED with increased weakness and inability to use walker with assistance. The patient ws seen with his daughter More, at bedside, who provided additional history. He was first diagnosed with stage 4 prostate cancer 5 years ago and underwent hormonal treatments. 1 year ago, the patient refused chemotherapy and has been conservatively managed since. The pt's daughter reports that he was found down in the bedroom after an unwitnessed fall. PD arrived and assisted the pt off the floor, however, he was not brought to ED at that time. This am, the pt was noted to have significantly increased difficulty transferring and ambulating with his walker. Pt's daughter contacted oncologist, Dr. Dougherty who advised EMS. Of note, the daughter states that the patient has been declining gradually over the course of the year with weight loss, decreased appetite and weakness, however in the last week, he seems to have had a rapid progression and is now barely eating solids. Family requesting palliative consult. PT is DNR/DNI. Denies fever, chills, headaches, dizziness, blurred vision, chest pain, palpitations, numbness/tingling, abdominal pain, diarrhea.    In the ED:  VSS  CBC pert for Hgb 8.2 (unknown baseline), MCV 88.7  INR 1.3, lactate 1.1 CMP sig for Na 129, BUN/Cr wnl, ca wnl corrected, trops negative, Alphos 663, AST/ALT 57/42 (mild elevation), Glu 188    UA negative  s/p 1550cc NS bolus  CXR: negative chest on wet read  EKG: NSR 85 BPM   (23 May 2021 14:21)      ---  HOSPITAL COURSE:   Patient was subsequently admitted to the general medical floor for management of failure to thrive and acute subdural hematomas. Pharmacalogic VTE prophylaxis was held. Neuro Dr. Portillo was consulted and recommended maintaining SBP <140s and EEG. EEG was performed and showed ___. Palliative Dr. Guille Jarrett was consulted, and patient was made comfort measures only. On admission, patient was found to be hyponatremic, but this slowly improved over his hospital stay. Patient was also anemic, likely 2/2 chronic disease. Patient was continued on home amiodarone for his history of SVT. For his weakness, patient was evaluated by PT who recommended ___. Upon nutritional assessment, patient met criteria for severe protein-calorie malnutrition and patient was initiated on dysphagia 1 honey consistency diet.    Patient was seen and examined on day of discharge:      Patient's clinical status remained stable during his admission, and he is considered stable to discharge to __.    updated through 5/24    ---  CONSULTANTS:   Neurology - Dr. Portillo  Palliative - Dr. Guille Jarrett    ---  TIME SPENT:  I, the attending physician, was physically present for the key portions of the evaluation and management (E/M) service provided. The total amount of time spent reviewing the hospital notes, laboratory values, imaging findings, assessing/counseling the patient, discussing with consultant physicians, social work, nursing staff was -- minutes    ---  Primary care provider was made aware of plan for discharge:      [  ] NO     [  ] YES   FROM ADMISSION H+P:   HPI:  81 M PMH prostate ca with bone mets, SVT (on amio), anemia (s/p transfusion), decrease PO intake/weightloss presenting to ED with increased weakness and inability to use walker with assistance. The patient ws seen with his daughter More, at bedside, who provided additional history. He was first diagnosed with stage 4 prostate cancer 5 years ago and underwent hormonal treatments. 1 year ago, the patient refused chemotherapy and has been conservatively managed since. The pt's daughter reports that he was found down in the bedroom after an unwitnessed fall. PD arrived and assisted the pt off the floor, however, he was not brought to ED at that time. This am, the pt was noted to have significantly increased difficulty transferring and ambulating with his walker. Pt's daughter contacted oncologist, Dr. Dougherty who advised EMS. Of note, the daughter states that the patient has been declining gradually over the course of the year with weight loss, decreased appetite and weakness, however in the last week, he seems to have had a rapid progression and is now barely eating solids. Family requesting palliative consult. PT is DNR/DNI. Denies fever, chills, headaches, dizziness, blurred vision, chest pain, palpitations, numbness/tingling, abdominal pain, diarrhea.    In the ED:  VSS  CBC pert for Hgb 8.2 (unknown baseline), MCV 88.7  INR 1.3, lactate 1.1 CMP sig for Na 129, BUN/Cr wnl, ca wnl corrected, trops negative, Alphos 663, AST/ALT 57/42 (mild elevation), Glu 188    UA negative  s/p 1550cc NS bolus  CXR: negative chest on wet read  EKG: NSR 85 BPM   (23 May 2021 14:21)      ---  HOSPITAL COURSE:   Patient was subsequently admitted to the general medical floor for management of failure to thrive and acute subdural hematomas. Pharmacalogic VTE prophylaxis was held. Neuro Dr. Portillo was consulted and recommended maintaining SBP <140s and EEG (disconsinued on comfort care). Palliative Dr. Guille Jarrett was consulted, and patient was made comfort measures only. On admission, patient was found to be hyponatremic, but this slowly improved over his hospital stay. Patient was also anemic, likely 2/2 chronic disease. Patient was continued on home amiodarone for his history of SVT. Conservative medical management was performed while patient was on comfort measures. For his weakness, patient was evaluated by PT who recommended ___. Upon nutritional assessment, patient met criteria for severe protein-calorie malnutrition and patient was initiated on dysphagia 1 honey consistency diet.    Patient was seen and examined on day of discharge:      Patient's clinical status remained stable during his admission, and he is considered stable to discharge to __.    updated through 5/25    ---  CONSULTANTS:   Neurology - Dr. Portillo  Palliative - Dr. Guille Jarrett    ---  TIME SPENT:  I, the attending physician, was physically present for the key portions of the evaluation and management (E/M) service provided. The total amount of time spent reviewing the hospital notes, laboratory values, imaging findings, assessing/counseling the patient, discussing with consultant physicians, social work, nursing staff was -- minutes    ---  Primary care provider was made aware of plan for discharge:      [  ] NO     [  ] YES   FROM ADMISSION H+P:   HPI:  81 M PMH prostate ca with bone mets, SVT (on amio), anemia (s/p transfusion), decrease PO intake/weightloss presenting to ED with increased weakness and inability to use walker with assistance. The patient ws seen with his daughter More, at bedside, who provided additional history. He was first diagnosed with stage 4 prostate cancer 5 years ago and underwent hormonal treatments. 1 year ago, the patient refused chemotherapy and has been conservatively managed since. The pt's daughter reports that he was found down in the bedroom after an unwitnessed fall. PD arrived and assisted the pt off the floor, however, he was not brought to ED at that time. This am, the pt was noted to have significantly increased difficulty transferring and ambulating with his walker. Pt's daughter contacted oncologist, Dr. Dougherty who advised EMS. Of note, the daughter states that the patient has been declining gradually over the course of the year with weight loss, decreased appetite and weakness, however in the last week, he seems to have had a rapid progression and is now barely eating solids. Family requesting palliative consult. PT is DNR/DNI. Denies fever, chills, headaches, dizziness, blurred vision, chest pain, palpitations, numbness/tingling, abdominal pain, diarrhea.    In the ED:  VSS  CBC pert for Hgb 8.2 (unknown baseline), MCV 88.7  INR 1.3, lactate 1.1 CMP sig for Na 129, BUN/Cr wnl, ca wnl corrected, trops negative, Alphos 663, AST/ALT 57/42 (mild elevation), Glu 188    UA negative  s/p 1550cc NS bolus  CXR: negative chest on wet read  EKG: NSR 85 BPM   (23 May 2021 14:21)      ---  HOSPITAL COURSE:   Patient was subsequently admitted to the general medical floor for management of failure to thrive and acute subdural hematomas. Pharmacalogic VTE prophylaxis was held. Neuro Dr. Portillo was consulted and recommended maintaining SBP <140s and EEG (disconsinued on comfort care). Palliative Dr. Guille Jarrett was consulted, and patient was made comfort measures only. On admission, patient was found to be hyponatremic, but this slowly improved over his hospital stay. Patient was also anemic, likely 2/2 chronic disease. Patient was continued on home amiodarone for his history of SVT. Conservative medical management was performed while patient was on comfort measures. For his weakness, patient was evaluated by PT who recommended [facility] vs LTC. Upon nutritional assessment, patient met criteria for severe protein-calorie malnutrition and patient was initiated on dysphagia 1 honey consistency diet.    Patient was seen and examined on day of discharge:      Patient's clinical status remained stable during his admission, and he is considered stable to discharge to __.    updated through 5/28    ---  CONSULTANTS:   Neurology - Dr. Portillo  Palliative - Dr. Guille Jarrett    ---  TIME SPENT:  I, the attending physician, was physically present for the key portions of the evaluation and management (E/M) service provided. The total amount of time spent reviewing the hospital notes, laboratory values, imaging findings, assessing/counseling the patient, discussing with consultant physicians, social work, nursing staff was -- minutes    ---  Primary care provider was made aware of plan for discharge:      [  ] NO     [  ] YES   FROM ADMISSION H+P:   HPI:  81 M PMH prostate ca with bone mets, SVT (on amio), anemia (s/p transfusion), decrease PO intake/weightloss presenting to ED with increased weakness and inability to use walker with assistance. The patient ws seen with his daughter More, at bedside, who provided additional history. He was first diagnosed with stage 4 prostate cancer 5 years ago and underwent hormonal treatments. 1 year ago, the patient refused chemotherapy and has been conservatively managed since. The pt's daughter reports that he was found down in the bedroom after an unwitnessed fall. PD arrived and assisted the pt off the floor, however, he was not brought to ED at that time. This am, the pt was noted to have significantly increased difficulty transferring and ambulating with his walker. Pt's daughter contacted oncologist, Dr. Dougherty who advised EMS. Of note, the daughter states that the patient has been declining gradually over the course of the year with weight loss, decreased appetite and weakness, however in the last week, he seems to have had a rapid progression and is now barely eating solids. Family requesting palliative consult. PT is DNR/DNI. Denies fever, chills, headaches, dizziness, blurred vision, chest pain, palpitations, numbness/tingling, abdominal pain, diarrhea.    In the ED:  VSS  CBC pert for Hgb 8.2 (unknown baseline), MCV 88.7  INR 1.3, lactate 1.1 CMP sig for Na 129, BUN/Cr wnl, ca wnl corrected, trops negative, Alphos 663, AST/ALT 57/42 (mild elevation), Glu 188    UA negative  s/p 1550cc NS bolus  CXR: negative chest on wet read  EKG: NSR 85 BPM   (23 May 2021 14:21)      ---  HOSPITAL COURSE:   Patient was subsequently admitted to the general medical floor for management of failure to thrive and acute subdural hematomas. Pharmacalogic VTE prophylaxis was held. Neuro Dr. Portillo was consulted and recommended maintaining SBP <140s and EEG (disconsinued on comfort care). Palliative Dr. Guille Jarrett was consulted, and patient was made comfort measures only. On admission, patient was found to be hyponatremic, but this slowly improved over his hospital stay. Patient was also anemic, likely 2/2 chronic disease. Patient was continued on home amiodarone for his history of SVT. Conservative medical management was performed while patient was on comfort measures. For his weakness, patient was evaluated by PT who recommended [facility] vs LTC. Upon nutritional assessment, patient met criteria for severe protein-calorie malnutrition and patient was initiated on dysphagia 1 honey consistency diet. Patient's clinical status remained stable during his admission, and he is considered stable to discharge to [facility].    ---  CONSULTANTS:   Neurology - Dr. Portillo  Palliative - Dr. Guille Jarrett    ---  TIME SPENT:  I, the attending physician, was physically present for the key portions of the evaluation and management (E/M) service provided. The total amount of time spent reviewing the hospital notes, laboratory values, imaging findings, assessing/counseling the patient, discussing with consultant physicians, social work, nursing staff was -- minutes    ---  Primary care provider was made aware of plan for discharge:      [  ] NO     [  ] YES

## 2021-05-24 NOTE — DISCHARGE NOTE PROVIDER - NSDCCPCAREPLAN_GEN_ALL_CORE_FT
PRINCIPAL DISCHARGE DIAGNOSIS  Diagnosis: Weakness generalized  Assessment and Plan of Treatment: You were brought to the hospital for weakness after sustaining a fall at home. A CT scan of your head and neck was performed and showed that you had an internal bleed on your brain called subdural hematoma. A neurologist Dr. Portillo evaluated you and recommended performing an EEG. This was done and showed __. For your weakness, you were also evaluated by a physical therapist who recommended __.      SECONDARY DISCHARGE DIAGNOSES  Diagnosis: Prostate cancer  Assessment and Plan of Treatment: You were evaluated by pallaitive care Dr. Guille Jarrett. After a discussion regarding your goals of care, it was decided to focus your care on solely making sure you are comfortable.    Diagnosis: Anemia of chronic disease  Assessment and Plan of Treatment: You were previously diagnosed with anemia. This means that you have a low number of red blood cells in your blood. Please continue to follow up with your hematologist for further management. Return to ED or call your doctor immediatley if you develop beleeding, dizziness, feel feint, palpaitations, chest pain.    Diagnosis: Hyponatremia  Assessment and Plan of Treatment: When you came to the hospital, you were found to have low sodium in your blood. This may be related to poor food intake at home or also related to your fall and brain bleed which can sometimes results in a syndrome called SIADH (affects water retention and salt levels). Your sodium level slowly improved during your hospital stay.    Diagnosis: SVT (supraventricular tachycardia)  Assessment and Plan of Treatment: You were previously diagnosed with an arrhythmia called SVT. You have been prescribed a medication called amiodarone. Continue to take this medication as prescribed and follow up with your PCP or cardiologist for further management.     PRINCIPAL DISCHARGE DIAGNOSIS  Diagnosis: Weakness generalized  Assessment and Plan of Treatment: You were brought to the hospital for weakness after sustaining a fall at home. A CT scan of your head and neck was performed and showed that you had an internal bleed on your brain called subdural hematoma. A neurologist Dr. Portillo evaluated you and recommended performing an EEG, however this was not performed once you were made comfort measures only.      SECONDARY DISCHARGE DIAGNOSES  Diagnosis: Prostate cancer  Assessment and Plan of Treatment: You were evaluated by pallaitive care Dr. Guille Jarrett. After a discussion regarding your goals of care, it was decided to focus your care on solely making sure you are comfortable.    Diagnosis: Hyponatremia  Assessment and Plan of Treatment: When you came to the hospital, you were found to have low sodium in your blood. This may be related to poor food intake at home or also related to your fall and brain bleed which can sometimes results in a syndrome called SIADH (affects water retention and salt levels). Your sodium level slowly improved during your hospital stay.    Diagnosis: SVT (supraventricular tachycardia)  Assessment and Plan of Treatment: You were previously diagnosed with an arrhythmia called SVT. You have been prescribed a medication called amiodarone. Continue to take this medication as prescribed and follow up with your PCP or cardiologist for further management.    Diagnosis: Anemia of chronic disease  Assessment and Plan of Treatment: You were previously diagnosed with anemia. This means that you have a low number of red blood cells in your blood. Please continue to follow up with your hematologist for further management. Return to ED or call your doctor immediatley if you develop beleeding, dizziness, feel feint, palpaitations, chest pain.     PRINCIPAL DISCHARGE DIAGNOSIS  Diagnosis: Weakness generalized  Assessment and Plan of Treatment: You were brought to the hospital for weakness after sustaining a fall at home. A CT scan of your head and neck was performed and showed that you had an internal bleed on your brain called subdural hematoma. A neurologist Dr. Portillo evaluated you and recommended performing an EEG, however this was not performed once you were made comfort measures only.      SECONDARY DISCHARGE DIAGNOSES  Diagnosis: Prostate cancer  Assessment and Plan of Treatment: You were evaluated by pallaitive care Dr. Guille Jarrett. After a discussion regarding your goals of care, it was decided to focus your care on solely making sure you are comfortable.    Diagnosis: Hyponatremia  Assessment and Plan of Treatment: When you came to the hospital, you were found to have low sodium in your blood. This may be related to poor food intake at home or also related to your fall and brain bleed which can sometimes results in a syndrome called SIADH (affects water retention and salt levels). Your sodium level slowly improved during your hospital stay.    Diagnosis: SVT (supraventricular tachycardia)  Assessment and Plan of Treatment: You were previously diagnosed with an arrhythmia called SVT. You have been prescribed a medication called amiodarone. Continue to take this medication as prescribed and follow up with your PCP or cardiologist for further management.    Diagnosis: Anemia of chronic disease  Assessment and Plan of Treatment: You were previously diagnosed with anemia. This means that you have a low number of red blood cells in your blood. Please continue to follow up with your hematologist for further management. Return to ED or call your doctor immediatley if you develop beleeding, dizziness, feel faint, palpaitations, chest pain.

## 2021-05-24 NOTE — PROGRESS NOTE ADULT - PROBLEM SELECTOR PLAN 2
-S/P recent fall (see below), unclear etiology, likely multifactorial 2/2 to current hyponatremia/metastatic prostate ca  -PT consult  -Nutrition consult  -Continue ensure supplements -S/P recent fall (see below),   -Malnutrition severe chronic, etiology related to poor appetite, prostate ca with mets to bone, per nutrition  -Diet: Dysphagia 1 Pureed-Honey Consistency Fluid, continue ensure supplements  -Nutrition consulted, recs appreciated   -PT consult -S/P recent fall  -Malnutrition severe chronic, etiology related to poor appetite, prostate ca with mets to bone, per nutrition  -Diet: Dysphagia 1 Pureed-Honey Consistency Fluid, continue ensure supplements  -Nutrition consulted, recs appreciated   -PT consult

## 2021-05-24 NOTE — PHYSICAL THERAPY INITIAL EVALUATION ADULT - PERTINENT HX OF CURRENT PROBLEM, REHAB EVAL
81 M PMH prostate ca with bone mets, SVT (on amio), anemia (s/p transfusion), decrease PO intake/weightloss presenting to ED with increased weakness and inability to use walker with assistance.

## 2021-05-24 NOTE — SWALLOW BEDSIDE ASSESSMENT ADULT - COMMENTS
Consult received and chart reviewed. The patient was seen at bedside this afternoon for an initial assessment of swallow function, at which time he was awake, pleasant, and cooperative. The patient is denying any pain at this time and further denied any swallowing difficulties.    Per charting, the patient is an "81 M PMH prostate ca with bone mets, SVT (on amio), anemia (s/p transfusion), decrease PO intake/weightloss presenting to ED with increased weakness and inability to use walker with assistance. admitted for failure to thrive and falls with subdural hematoma."    WBC is WFL. CXR revealed, "1. Negative of acute pulmonary process. 2. Bony changes as above." CT of the head revealed, "Areas of bilateral subdural hemorrhage are present, acute on the right, and acute on chronic on the left. There is no associated midline shift. Widespread bony metastatic disease."    Discussed results and recommendations from this evaluation with the patient, RN, and left message for MD.

## 2021-05-24 NOTE — PROGRESS NOTE ADULT - PROBLEM SELECTOR PLAN 8
No pharmacologic ac 2/2 subdural hematomas  -SCDs No pharmacologic ac 2/2 subdural hematomas, per neuro recs  -SCDs

## 2021-05-24 NOTE — DIETITIAN INITIAL EVALUATION ADULT. - PROBLEM SELECTOR PLAN 1
-Unwitnessed fall at home  -CT head/neck: Areas of bilateral subdural hemorrhage are present, acute on the right, and acute on chronic on the left. There is no associated midline shift. Widespread bony metastatic disease. No evidence for acute fracture. If symptoms persist, consider cervical spine MRI imaging follow-up. Widespread bony metastatic disease.  -No pharmacologic VTE ppx  -fall precautions  -Neuro checks q6  -Neuro consult, Dr. Portillo  -further intervention pending GOC, apprec palliative collaboration-family wants to transition to LTC with hospice  -f/u formal speech and swallow eval

## 2021-05-24 NOTE — DIETITIAN INITIAL EVALUATION ADULT. - PROBLEM SELECTOR PLAN 2
-S/P recent fall (see below), unclear etiology, likely multifactorial 2/2 to current hyponatremia/metastatic prostate ca  -PT consult  -Nutrition consult  -Continue ensure supplements

## 2021-05-24 NOTE — PROGRESS NOTE ADULT - PROBLEM SELECTOR PLAN 1
-Unwitnessed fall at home  -CT head/neck: Areas of bilateral subdural hemorrhage are present, acute on the right, and acute on chronic on the left. There is no associated midline shift. Widespread bony metastatic disease. No evidence for acute fracture. If symptoms persist, consider cervical spine MRI imaging follow-up. Widespread bony metastatic disease.  -No pharmacologic VTE ppx  -fall precautions  -Neuro checks q6  -Neuro consult, Dr. Portillo  -further intervention pending GOC, apprec palliative collaboration-family wants to transition to LTC with hospice  -f/u formal speech and swallow eval -Unwitnessed fall at home, no LOC  -CT head/neck: Areas of bilateral subdural hemorrhage are present, acute on the right, and acute on chronic on the left. There is no associated midline shift. Widespread bony metastatic disease. No evidence for acute fracture. Widespread bony metastatic disease. If symptoms persist, consider cervical spine MRI imaging follow-up.   -No pharmacologic VTE ppx  -Fall precautions  -Neuro checks q6  -Maintain a systolic blood pressure of less than 140  -Plan for EEG  -Check orthostatics  -Diet: Pureed-Honey Consistency Fluid, per nutrition recs  -Neuro consulted, recs appreciated  -further intervention pending GOC, apprec palliative collaboration-family wants to transition to LTC with hospice - Unwitnessed fall at home, no LOC  - CT head/neck: Areas of bilateral subdural hemorrhage are present, acute on the right, and acute on chronic on the left. There is no associated midline shift. Widespread bony metastatic disease. No evidence for acute fracture. Widespread bony metastatic disease. If symptoms persist, consider cervical spine MRI imaging follow-up.   - No pharmacologic VTE ppx  - Fall precautions  - Neuro checks q6  - Maintain a systolic blood pressure of less than 140  - Plan for EEG  - Check orthostatics  - Neuro consulted, recs appreciated  - Further intervention pending GOC, apprec palliative collaboration-family wants to transition to LTC with hospice - Unwitnessed fall at home, no LOC  - CT head/neck: Areas of bilateral subdural hemorrhage are present, acute on the right, and acute on chronic on the left. There is no associated midline shift. Widespread bony metastatic disease. No evidence for acute fracture. Widespread bony metastatic disease. If symptoms persist, consider cervical spine MRI imaging follow-up.   - No pharmacologic VTE ppx  - Fall precautions  - Plan for EEG  - Neuro consulted, recs appreciated  - COMFORT CARE - no labs, no vitals, no mews, no RRTs   - Further intervention pending GOC, apprec palliative collaboration-family wants to transition to LTC with hospice

## 2021-05-24 NOTE — PROGRESS NOTE ADULT - PROBLEM SELECTOR PLAN 4
- Likely anemia of chronic disease 2/2 metastatic disease  - Vitals stable, no signs or symptoms of bleeding  - Monitor cbc - Likely anemia of chronic disease 2/2 metastatic disease  - Hgb 8.2-->7.7, if < 7 transfuse  - Vitals stable, no signs or symptoms of bleeding  - Monitor cbc - Likely anemia of chronic disease 2/2 metastatic disease  - Hgb 8.2-->7.7, transfude if < 7 transfuse  - Vitals stable, no signs or symptoms of bleeding  - Monitor cbc - Likely anemia of chronic disease 2/2 metastatic disease  - Vitals stable, no signs or symptoms of bleeding

## 2021-05-24 NOTE — DIETITIAN INITIAL EVALUATION ADULT. - PROBLEM SELECTOR PLAN 3
Likely acute given hx of recent decrease PO intake, Na 129 with glu 180  -Likely hypovolemic hyponatremia  -S/P 1.5 L NS bolus  -continue NS at 60cc/hr for 8 hrs.  -Follow up BMP on 05/23/2021  -Check serum/urine osms  -May have underlying component of SIADH given recent fall/trauma and endogenous cortisol release/stress  -Limit Na correction to 6mEQ over 24 hours for now, should etiology have chronic component to preclude ODS  -Routine I/O

## 2021-05-24 NOTE — DISCHARGE NOTE PROVIDER - NSDCMRMEDTOKEN_GEN_ALL_CORE_FT
amiodarone 200 mg oral tablet: 1 tab(s) orally once a day  Colace 100 mg oral capsule: 1 cap(s) orally 2 times a day, As Needed  MiraLax oral powder for reconstitution: 1 packet(s) orally once a day, As Needed  Multiple Vitamins oral capsule: 1 cap(s) orally once a day   amiodarone 200 mg oral tablet: 1 tab(s) orally once a day  amiodarone 200 mg oral tablet: 2 tab(s) orally every 12 hours ( Last dose in am on 2/28, then take 200mg daily )  amiodarone 200 mg oral tablet: 1 tab(s) orally once a day   amiodarone 200 mg oral tablet: 1 tab(s) orally once a day  calcium carbonate 1250 mg (500 mg elemental calcium) oral tablet: 1 tab(s) orally once a day  cholecalciferol oral tablet: 1000 unit(s) orally once a day  Colace 100 mg oral capsule: 1 cap(s) orally 2 times a day, As Needed  cyanocobalamin 1000 mcg oral tablet: 1 tab(s) orally once a day  Lupron: dose(s) subcutaneous prn  metFORMIN 500 mg oral tablet: 1 tab(s) orally 2 times a day  metFORMIN 500 mg oral tablet: 1 tab(s) orally 2 times a day  MiraLax oral powder for reconstitution: 1 packet(s) orally once a day, As Needed  Multiple Vitamins oral capsule: 1 cap(s) orally once a day  predniSONE 5 mg oral tablet: 1 tab(s) orally 2 times a day  predniSONE 5 mg oral tablet: 1 tab(s) orally 2 times a day  Valtrex 500 mg oral tablet: 1 tab(s) orally 3 times a day  Vitamin B-12:    amiodarone 200 mg oral tablet: 1 tab(s) orally once a day  MiraLax oral powder for reconstitution: 1 packet(s) orally once a day, As Needed

## 2021-05-24 NOTE — PROGRESS NOTE ADULT - PROBLEM SELECTOR PLAN 6
-EKG NSR, no acute ischemic changes  -Appears NSR in ED  -Continue home dose amio  -AST mild elevation, ALT wnl, cont to monitor - EKG NSR, no acute ischemic changes  - Continue home dose amio

## 2021-05-24 NOTE — PHYSICAL THERAPY INITIAL EVALUATION ADULT - TRANSFER TRAINING, PT EVAL
Patient will transfer from all surfaces with mod A x 1 in 2 weeks in order to increase mobility at home

## 2021-05-24 NOTE — PROGRESS NOTE ADULT - ASSESSMENT
81 M PMH prostate ca with bone mets, SVT (on amio), anemia (s/p transfusion), decrease PO intake/weightloss presenting to ED with increased weakness and inability to use walker with assistance. admitted for failure to thrive and falls with subdural hematoma 81 M PMH prostate ca with bone mets, SVT (on amio), anemia (s/p transfusion), decrease PO intake/weightloss presenting to ED with increased weakness and inability to use walker with assistance. admitted for failure to thrive and falls with subdural hematoma.

## 2021-05-24 NOTE — SWALLOW BEDSIDE ASSESSMENT ADULT - PHARYNGEAL PHASE
Delayed pharyngeal swallow/Decreased laryngeal elevation Delayed pharyngeal swallow/Decreased laryngeal elevation/Throat clear post oral intake Delayed pharyngeal swallow/Decreased laryngeal elevation/Cough post oral intake

## 2021-05-24 NOTE — SWALLOW BEDSIDE ASSESSMENT ADULT - SWALLOW EVAL: DIAGNOSIS
1. The patient demonstrated a mild oral dysphagia for puree, honey thick, and nectar thick liquids marked by delayed bolus collection, transfer, and posterior transport. 2. The patient demonstrated a mild-moderate oral dysphagia for mechanical soft solids marked by prolonged oral manipulation and increased mastication time resulting in delayed bolus collection and posterior transport. Mild lingual residue noted subsequent to deglutition which reduced with a liquid wash. 3. The patient demonstrated a mild-moderate oral dysphagia for thin liquids marked by delayed bolus collection and transfer with suspected posterior loss over the base of tongue. 4. The patient demonstrated a mild pharyngeal dysphagia for puree, honey thick, and nectar thick liquids marked by a suspected delayed pharyngeal swallow trigger with hyolaryngeal elevation noted upon digital palpation w/o evidence of airway penetration. 5. The patient demonstrated a moderate pharyngeal dysphagia for mechanical soft solids

## 2021-05-24 NOTE — PROGRESS NOTE ADULT - PROBLEM SELECTOR PLAN 3
Likely acute given hx of recent decrease PO intake, Na 129 with glu 180  -Likely hypovolemic hyponatremia  -S/P 1.5 L NS bolus  -continue NS at 60cc/hr for 8 hrs.  -Follow up BMP on 05/23/2021  -Check serum/urine osms  -May have underlying component of SIADH given recent fall/trauma and endogenous cortisol release/stress  -Limit Na correction to 6mEQ over 24 hours for now, should etiology have chronic component to preclude ODS  -Routine I/O Likely acute given hx of recent decrease PO intake, may have underlying component of SIADH given recent fall/trauma and endogenous cortisol release/stress  - Na 129--->133-->134  with glu 121-->167-->188  -Urine osms 401, sodium 53, creatinine 27  -Likely hypovolemic hyponatremia  -Limit Na correction to 6mEQ over 24 hours for now, should etiology have chronic component to preclude ODS  -continue NS at 60cc/hr for 8 hrs  -Routine I/O  -F/u daily BMP Likely acute given hx of recent decrease PO intake, may have underlying component of SIADH given recent fall/trauma and endogenous cortisol release/stress  - Likely hypovolemic hyponatremia  - Na improving

## 2021-05-24 NOTE — SWALLOW BEDSIDE ASSESSMENT ADULT - SWALLOW EVAL: PROGNOSIS
DIAGNOSIS CONT: marked by a suspected delayed pharyngeal swallow trigger with hyolaryngeal elevation noted upon digital palpation resulting in throat clearing suggestive of airway penetration. 6. The patient demonstrated a severe pharyngeal dysphagia for thin liquids marked by a suspected delayed pharyngeal swallow trigger with hyolaryngeal elevation noted upon digital palpation resulting in coughing suggestive of airway penetration.                                                                                                                                                                                                                                             PROGNOSIS: Good for recommended diet

## 2021-05-24 NOTE — GOALS OF CARE CONVERSATION - ADVANCED CARE PLANNING - CONVERSATION DETAILS
Writer spoke with pt daughters Reviewed patient's medical and social history as well as events leading to patient's hospitalization. Writer discussed patient's current diagnosis Prostate Ca with mets ,increased debility, advanced age ,dysphagia, history of fall , impaired mental status. medical condition and management, poor prognosis, and life expectancy of weeks. Inquired about patient's wishes regarding extent of medical care to be provided including escalation of medical care into the ICU and use of intubation and ventilatory support. In addition, the writer inquired about thoughts regarding cardiopulmonary resuscitation, artificial nutrition and hydration including use of feeding tubes and IVF, antibiotics, and further investigative studies such as blood draws and radiology. Daughter is a physician and showed excellent. insight into medical condition. Writer recommended completion of MOLST and hospice/comfort measures only. Explained in full detail .All questions answered Daughters agree with DNR/DNI and comfort measures only and hospice eval.

## 2021-05-24 NOTE — PHYSICAL THERAPY INITIAL EVALUATION ADULT - ADDITIONAL COMMENTS
Patient lives with family in private home, + [facility] then resides on main floor.  Patient reports he was independent in all ADLs and ambulated with RW but noted decline recently.

## 2021-05-24 NOTE — DIETITIAN INITIAL EVALUATION ADULT. - REASON
did not complete a full PE at this time as pt is confused, sleeping; awaiting palliative care/possible hospice as per family's wishes

## 2021-05-24 NOTE — DIETITIAN INITIAL EVALUATION ADULT. - PROBLEM SELECTOR PLAN 4
-Patient with hx of stage 4 prostatic cancer with METS  -Palliative care consult for formal discussion of GOC at this time  -MOLST in Chart, DNR/DNI  -Patient denies current pain, will escalate as needed

## 2021-05-24 NOTE — PROGRESS NOTE ADULT - SUBJECTIVE AND OBJECTIVE BOX
Neurology follow up note    RUANGSAK [id number]      Interval History:    Patient feels ok no new complaints.    MEDICATIONS    aMIOdarone    Tablet 200 milliGRAM(s) Oral daily  sodium chloride 0.9%. 1000 milliLiter(s) IV Continuous <Continuous>      Allergies    No Known Allergies    Intolerances        Height (cm): 170.2 ( @ 15:37)  Weight (kg): 50 ( @ 12:34)  BMI (kg/m2): 17.3 ( @ 15:37)    Vital Signs Last 24 Hrs  T(C): 36.7 (24 May 2021 04:56), Max: 37.7 (23 May 2021 13:23)  T(F): 98.1 (24 May 2021 04:56), Max: 99.8 (23 May 2021 13:23)  HR: 67 (24 May 2021 08:52) (67 - 90)  BP: 125/64 (24 May 2021 08:52) (121/70 - 159/69)  BP(mean): --  RR: 17 (24 May 2021 04:56) (16 - 18)  SpO2: 98% (24 May 2021 04:56) (96% - 99%)    REVIEW OF SYSTEMS:  Slightly limited secondary to the patient does appear to be forgetful at times, but constitutionally denies fever, chills, or night sweats.  Head:  No headaches.  Eyes:  No double vision or blurry vision.  Ears:  No ringing in the ears.  Neck:  No neck pain.  Cardiovascular:  No chest pain.  Respiratory:  No shortness of breath.  Abdomen:  No nausea, vomiting, or abdominal pain.  Extremities/Neurological:  No numbness or tingling.  Musculoskeletal:  Occasional joint pain.    PHYSICAL EXAMINATION:   HEENT:  Head:  Normocephalic and atraumatic.  Eyes:  No scleral icterus.  Ears:  Hearing bilaterally appeared to be intact.  NECK:  Supple.  CARDIOVASCULAR:  S1 and S2 heard.  RESPIRATORY:  Decreased breath sounds bilaterally.  ABDOMEN:  Soft, nontender.  EXTREMITIES:  No clubbing or cyanosis were noted.      NEUROLOGIC:  The patient was awake and alert.  Location with choice was hospital, year and month were unknown, but was able to repeat his birth date, was able to name simple objects.  Extraocular movements were intact.  Speech was fluent.  Smile symmetric.  Motor, bilateral upper 4/5, bilateral lower 3-/5.  The patient does appear to have slightly increased tone in bilateral lower extremities.  Sensory:  Bilateral intact to light touch.              LABS:  CBC Full  -  ( 24 May 2021 08:28 )  WBC Count : 4.33 K/uL  RBC Count : 2.74 M/uL  Hemoglobin : 7.7 g/dL  Hematocrit : 24.6 %  Platelet Count - Automated : 253 K/uL  Mean Cell Volume : 89.8 fl  Mean Cell Hemoglobin : 28.1 pg  Mean Cell Hemoglobin Concentration : 31.3 gm/dL  Auto Neutrophil # : 3.33 K/uL  Auto Lymphocyte # : 0.52 K/uL  Auto Monocyte # : 0.48 K/uL  Auto Eosinophil # : 0.00 K/uL  Auto Basophil # : 0.00 K/uL  Auto Neutrophil % : 77.0 %  Auto Lymphocyte % : 12.0 %  Auto Monocyte % : 11.0 %  Auto Eosinophil % : 0.0 %  Auto Basophil % : 0.0 %    Urinalysis Basic - ( 23 May 2021 13:15 )    Color: Yellow / Appearance: Clear / S.015 / pH: x  Gluc: x / Ketone: Moderate  / Bili: Negative / Urobili: 4   Blood: x / Protein: 30 mg/dL / Nitrite: Negative   Leuk Esterase: Trace / RBC: 0-2 /HPF / WBC 0-2   Sq Epi: x / Non Sq Epi: Occasional / Bacteria: Occasional      05-    134<L>  |  102  |  11  ----------------------------<  121<H>  3.6   |  23  |  0.25<L>    Ca    7.2<L>      24 May 2021 08:28    TPro  5.8<L>  /  Alb  2.0<L>  /  TBili  0.6  /  DBili  x   /  AST  43<H>  /  ALT  36  /  AlkPhos  544<H>  05-24    Hemoglobin A1C:     LIVER FUNCTIONS - ( 24 May 2021 08:28 )  Alb: 2.0 g/dL / Pro: 5.8 g/dL / ALK PHOS: 544 U/L / ALT: 36 U/L / AST: 43 U/L / GGT: x           Vitamin B12   PT/INR - ( 23 May 2021 13:02 )   PT: 15.3 sec;   INR: 1.33 ratio         PTT - ( 23 May 2021 13:02 )  PTT:30.0 sec      RADIOLOGY    ANALYSIS AND PLAN:  An 81-year-old with a history of fall being found on the ground and subdural hematomas.  For episode of subdural hematomas, we would recommend no blood thinning medication.  If possible please maintain a systolic blood pressure of less than 140.  For episode of being found on the ground, questionable the patient had syncopal event versus mechanical fall with head trauma.  We would recommend fall precaution.  Advanced care directives discussed with the patient.   Safety concerns were provided to staff.  For possibly mild cognitive impairment versus subtle dementia, we would check TSH, vitamin B12 and folate.  For now, we would recommend supportive therapy.  Marci at [phone] 2021  For prostate cancer with metastatic spread appears to be advanced, we would recommend supportive therapy.    Greater than 45 minutes of time was spent with the patient, plan of care, reviewing data, with greater than 50% of the visit was spent counseling and/or coordinating care with multidisciplinary healthcare team   Neurology follow up note    RUANGSAK [id number]      Interval History:    Patient feels ok no new complaints.    MEDICATIONS    aMIOdarone    Tablet 200 milliGRAM(s) Oral daily  sodium chloride 0.9%. 1000 milliLiter(s) IV Continuous <Continuous>      Allergies    No Known Allergies    Intolerances        Height (cm): 170.2 ( @ 15:37)  Weight (kg): 50 ( @ 12:34)  BMI (kg/m2): 17.3 ( @ 15:37)    Vital Signs Last 24 Hrs  T(C): 36.7 (24 May 2021 04:56), Max: 37.7 (23 May 2021 13:23)  T(F): 98.1 (24 May 2021 04:56), Max: 99.8 (23 May 2021 13:23)  HR: 67 (24 May 2021 08:52) (67 - 90)  BP: 125/64 (24 May 2021 08:52) (121/70 - 159/69)  BP(mean): --  RR: 17 (24 May 2021 04:56) (16 - 18)  SpO2: 98% (24 May 2021 04:56) (96% - 99%)    REVIEW OF SYSTEMS:  Slightly limited secondary to the patient does appear to be forgetful at times, but constitutionally denies fever, chills, or night sweats.  Head:  No headaches.  Eyes:  No double vision or blurry vision.  Ears:  No ringing in the ears.  Neck:  No neck pain.  Cardiovascular:  No chest pain.  Respiratory:  No shortness of breath.  Abdomen:  No nausea, vomiting, or abdominal pain.  Extremities/Neurological:  No numbness or tingling.  Musculoskeletal:  Occasional joint pain.    PHYSICAL EXAMINATION:   HEENT:  Head:  Normocephalic and atraumatic.  Eyes:  No scleral icterus.  Ears:  Hearing bilaterally appeared to be intact.  NECK:  Supple.  CARDIOVASCULAR:  S1 and S2 heard.  RESPIRATORY:  Decreased breath sounds bilaterally.  ABDOMEN:  Soft, nontender.  EXTREMITIES:  No clubbing or cyanosis were noted.      NEUROLOGIC:  The patient was awake and alert.  Location with choice was hospital, year and month were unknown, but was able to repeat his birth date, was able to name simple objects.  Extraocular movements were intact.  Speech was fluent.  Smile symmetric.  Motor, bilateral upper 4/5, bilateral lower 3-/5.  The patient does appear to have slightly increased tone in bilateral lower extremities.  Sensory:  Bilateral intact to light touch.              LABS:  CBC Full  -  ( 24 May 2021 08:28 )  WBC Count : 4.33 K/uL  RBC Count : 2.74 M/uL  Hemoglobin : 7.7 g/dL  Hematocrit : 24.6 %  Platelet Count - Automated : 253 K/uL  Mean Cell Volume : 89.8 fl  Mean Cell Hemoglobin : 28.1 pg  Mean Cell Hemoglobin Concentration : 31.3 gm/dL  Auto Neutrophil # : 3.33 K/uL  Auto Lymphocyte # : 0.52 K/uL  Auto Monocyte # : 0.48 K/uL  Auto Eosinophil # : 0.00 K/uL  Auto Basophil # : 0.00 K/uL  Auto Neutrophil % : 77.0 %  Auto Lymphocyte % : 12.0 %  Auto Monocyte % : 11.0 %  Auto Eosinophil % : 0.0 %  Auto Basophil % : 0.0 %    Urinalysis Basic - ( 23 May 2021 13:15 )    Color: Yellow / Appearance: Clear / S.015 / pH: x  Gluc: x / Ketone: Moderate  / Bili: Negative / Urobili: 4   Blood: x / Protein: 30 mg/dL / Nitrite: Negative   Leuk Esterase: Trace / RBC: 0-2 /HPF / WBC 0-2   Sq Epi: x / Non Sq Epi: Occasional / Bacteria: Occasional      05-    134<L>  |  102  |  11  ----------------------------<  121<H>  3.6   |  23  |  0.25<L>    Ca    7.2<L>      24 May 2021 08:28    TPro  5.8<L>  /  Alb  2.0<L>  /  TBili  0.6  /  DBili  x   /  AST  43<H>  /  ALT  36  /  AlkPhos  544<H>  05-24    Hemoglobin A1C:     LIVER FUNCTIONS - ( 24 May 2021 08:28 )  Alb: 2.0 g/dL / Pro: 5.8 g/dL / ALK PHOS: 544 U/L / ALT: 36 U/L / AST: 43 U/L / GGT: x           Vitamin B12   PT/INR - ( 23 May 2021 13:02 )   PT: 15.3 sec;   INR: 1.33 ratio         PTT - ( 23 May 2021 13:02 )  PTT:30.0 sec      RADIOLOGY    ANALYSIS AND PLAN:  An 81-year-old with a history of fall being found on the ground and subdural hematomas.  For episode of subdural hematomas, we would recommend no blood thinning medication.  If possible please maintain a systolic blood pressure of less than 140.  For episode of being found on the ground, questionable the patient had syncopal event versus mechanical fall with head trauma.  We would recommend fall precaution.  Advanced care directives discussed with the patient.   Safety concerns were provided to staff.  For possibly mild cognitive impairment versus subtle dementia. For now, we would recommend supportive therapy.  Marci at [phone] 2021  For prostate cancer with metastatic spread appears to be advanced, we would recommend supportive therapy.  EEG   will check orthostatic changes     Greater than 45 minutes of time was spent with the patient, plan of care, reviewing data, with greater than 50% of the visit was spent counseling and/or coordinating care with multidisciplinary healthcare team   Yes

## 2021-05-24 NOTE — DIETITIAN INITIAL EVALUATION ADULT. - PROBLEM SELECTOR PLAN 6
-Nutrition eval  -Formal speech and swallow assessment  -Aspiration precautions  -Pureed with honey thick for now. Ensure TID  -SW consult. Family requesting long term car facility with hospice unit at this time, Per daughter, Dr. More Parra.

## 2021-05-24 NOTE — DIETITIAN INITIAL EVALUATION ADULT. - PROBLEM SELECTOR PLAN 5
-EKG NSR, no acute ischemic changes  -Appears NSR in ED  -Continue home dose amio  -AST mild elevation, ALT wnl, cont to monitor

## 2021-05-24 NOTE — PROGRESS NOTE ADULT - PROBLEM SELECTOR PLAN 7
-Nutrition eval  -Formal speech and swallow assessment  -Aspiration precautions  -Pureed with honey thick for now. Ensure TID  -SW consult. Family requesting long term car facility with hospice unit at this time, Per daughter, Dr. More Parra. -Malnutrition severe chronic, etiology related to poor appetite, prostate ca with mets to bone, per nutrition  -Diet: dysphagia 1 Pureed-Honey Consistency Fluid, continue ensure TID supplements  -Aspiration precautions  -SW consult. Family requesting long term car facility with hospice unit at this time, Per daughter, Dr. More Parra.

## 2021-05-24 NOTE — SWALLOW BEDSIDE ASSESSMENT ADULT - SWALLOW EVAL: REHAB POTENTIAL
8/10/2018    Richmond Martinez MD  [address] [facility] [address]    RE: Eder Horan       Dear Colleague,    I had the pleasure of seeing Eder Horan in the [facility].    HPI and Plan: #[id number]  See dictation    Orders Placed This Encounter   Procedures     EKG 12-lead complete w/read - Clinics       Orders Placed This Encounter   Medications     latanoprost (XALATAN) 0.005 % ophthalmic solution     Sig: Place 1 drop Into the left eye daily     prednisoLONE acetate (PRED FORTE) 1 % ophthalmic susp     Sig: Place 1-2 drops Into the left eye 3 times daily     flecainide acetate 150 MG TABS     Si/2 tablet once daily     Dispense:  45 tablet     Refill:  3       Medications Discontinued During This Encounter   Medication Reason     flecainide acetate 150 MG TABS Reorder         Encounter Diagnoses   Name Primary?     Paroxysmal atrial fibrillation (H) Yes     Essential hypertension with goal blood pressure less than 140/90      Bradycardia      Hyperlipidemia LDL goal <130        CURRENT MEDICATIONS:  Current Outpatient Prescriptions   Medication Sig Dispense Refill     apixaban ANTICOAGULANT (ELIQUIS) 5 MG tablet TAKE ONE TABLET BY MOUTH TWICE DAILY. 180 tablet 3     beclomethasone (QVAR) 40 MCG/ACT Inhaler Inhale 2 puffs into the lungs daily 3 Inhaler 3     flecainide acetate 150 MG TABS 1/2 tablet once daily 45 tablet 3     fluocinonide (LIDEX) 0.05 % cream Apply topically 2 times daily 120 g 3     latanoprost (XALATAN) 0.005 % ophthalmic solution Place 1 drop Into the left eye daily       levothyroxine (SYNTHROID/LEVOTHROID) 125 MCG tablet Take 1 tablet (125 mcg) by mouth daily 90 tablet 2     losartan (COZAAR) 25 MG tablet Take 1 tablet (25 mg) by mouth daily 90 tablet 3     prednisoLONE acetate (PRED FORTE) 1 % ophthalmic susp Place 1-2 drops Into the left eye 3 times daily       PROAIR  (90 BASE) MCG/ACT inhaler INHALE 2 PUFFS INTO THE LUNGS EVERY 4 HOURS AS NEEDED  FOR SHORTNESS OF BREATH / DYSPNEA 54 g 2     valACYclovir (VALTREX) 1000 mg tablet 500 mg daily   1     ORDER FOR DME Equipment being ordered: spacer for inhaler 1 Device 1     [DISCONTINUED] flecainide acetate 150 MG TABS Take 75 mg by mouth daily         ALLERGIES     Allergies   Allergen Reactions     Bactrim [Sulfa Drugs] Rash       PAST MEDICAL HISTORY:  Past Medical History:   Diagnosis Date     Actinic keratosis 6/09    face     ALLERGIC RHINITIS       Atrial fibrillation (H) 6/08     Back pain     s/p LESI through ortho Feb 2010     Basal cell carcinoma      Bradycardia      Chronotropic incompetence with sinus node dysfunction (H)      COPD (chronic obstructive pulmonary disease) (H)      DVT      Left calf 2003. 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 Hx A fib     Hyperlipidemia LDL goal <130 5/10/2011     Hypertension      HYPOTHYROIDISM      hypothyroidism     Lateral epicondylitis  of elbow 7/05    left     Malignant melanoma (H)      PLANTAR FASCITIS      Prostate cancer (H) 6/09    on Lupron therapy     Squamous cell carcinoma  Sept 2014     right lower lid, s/p MOHS     Syncope      TMJ (temporomandibular joint syndrome) 4/12/2012     Tobacco use disorder        PAST SURGICAL HISTORY:  Past Surgical History:   Procedure Laterality Date     ABDOMEN SURGERY  2004,06,09    Hernias (2) Prostate Removal(2009)     BIOPSY  2009    Prostate     C NONSPECIFIC PROCEDURE  1/07    Left groin exploration and left inguinal herniorrhaphy     C NONSPECIFIC PROCEDURE  5/08    Prostate bx (benign)     C NONSPECIFIC PROCEDURE  8/09    Wide local excision (left side), robotic-assisted laparoscopic prostatectomy and   Left pelvic lymph node dissection     C NONSPECIFIC PROCEDURE  August 2010    Right inguinal herniorrhaphy with mesh     CARDIOVERSION  10-16-08    failed     COLONOSCOPY  2004     EYE SURGERY      2013 lump from lower  left eye lid removed      GENITOURINARY SURGERY  2009    Prostate     HERNIA REPAIR  2004 - 2006       FAMILY HISTORY:  Family History   Problem Relation Age of Onset     C.A.D. Father      heart attack- angina     Coronary Artery Disease Father      Myocardial Infarction Father      Cancer Mother      lung     Lung Cancer Mother      Lung Cancer Sister      Unknown/Adopted Maternal Grandmother      Unknown/Adopted Maternal Grandfather      Unknown/Adopted Paternal Grandmother      Unknown/Adopted Paternal Grandfather      Unknown/Adopted Sister        SOCIAL HISTORY:  Social History     Social History     Marital status:      Spouse name: N/A     Number of children: 3     Years of education: N/A     Occupational History     [employer]      Retired     Social History Main Topics     Smoking status: Former Smoker     Types: Cigars     Quit date: 12/1/2006     Smokeless tobacco: Never Used      Comment: pipe daily, occasionally cigar     Alcohol use 0.0 oz/week     0 Standard drinks or equivalent per week      Comment: 2-3 drinks a day:  Wine/Beer     Drug use: No     Sexual activity: No     Other Topics Concern     Parent/Sibling W/ Cabg, Mi Or Angioplasty Before 65f 55m? No     Caffeine Concern No     2-3 cups of coffee a day     Sleep Concern No     Stress Concern No     Weight Concern No     Special Diet No     Exercise No     Seat Belt Yes     Social History Narrative       Review of Systems:  Skin:  Negative       Eyes:  Positive for glasses shingles in the left eye  ENT:  Negative      Respiratory:  Positive for dyspnea on exertion     Cardiovascular:    Positive for;edema;lightheadedness;palpitations had an episode of palpitations 2 weeks ago, lasted 3 hours - uneven heart rate, dizziness  Gastroenterology: Negative      Genitourinary:  Negative      Musculoskeletal:  Positive for joint pain    Neurologic:  Negative      Psychiatric:  Negative      Heme/Lymph/Imm:  Positive for allergies    Endocrine:  Positive for thyroid  "disorder      Physical Exam:  Vitals: /74  Pulse 54  Ht 1.778 m (5' 10\")  Wt 75.8 kg (167 lb)  BMI 23.96 kg/m2    Constitutional:  cooperative, alert and oriented, well developed, well nourished, in no acute distress overweight      Skin:  warm and dry to the touch, no apparent skin lesions or masses noted          Head:  normocephalic, no masses or lesions        Eyes:  pupils equal and round, conjunctivae and lids unremarkable, sclera white, no xanthalasma, EOMS intact, no nystagmus        ENT:  no pallor or cyanosis, dentition good        Neck:  carotid pulses are full and equal bilaterally, JVP normal, no carotid bruit        Respiratory:  normal breath sounds, clear to auscultation, normal A-P diameter, normal symmetry, normal respiratory excursion, no use of accessory muscles prolonged expiration       Cardiac: regular rhythm;normal S1 and S2 bradycardic     systolic murmur;grade 1          Extremities and Muscular Skeletal:  no deformities, clubbing, cyanosis, erythema observed;no edema              Neurological:  no gross motor deficits        Psych:  Alert and Oriented x 3;affect appropriate, oriented to time, person and place;oriented to time, person and place        CC  Jaya Franklin MD  [address]  [place] [id number]                Thank you for allowing me to participate in the care of your patient.      Sincerely,     Amira Mendes, NP, APRN [facility]    cc:   Jaya Franklin MD  [address]  [place] [id number]        " fair, will monitor progress closely

## 2021-05-24 NOTE — DIETITIAN INITIAL EVALUATION ADULT. - OTHER INFO
80 y/o male adm with generalized weakness, decreased appetite. PMH anemia, SVT, prostate Ca with mets to bone. Pt sleeping soundly, noted to be confused, as per EMR, did not disturb pt at this time. EMR reviewed. Family requesting palliative care (for possible hospice care); which is pending. SLP ordered, also pending. Currently pureed diet with HT liquids ordered. Pt visibly appears malnourished with severe depletion noted to temporal, orbital, clavicle, tricep regions (all exposed; not covered up at this time).

## 2021-05-25 PROCEDURE — 99232 SBSQ HOSP IP/OBS MODERATE 35: CPT | Mod: GC

## 2021-05-25 RX ADMIN — AMIODARONE HYDROCHLORIDE 200 MILLIGRAM(S): 400 TABLET ORAL at 05:58

## 2021-05-25 NOTE — PROGRESS NOTE ADULT - ASSESSMENT
81 M PMH prostate ca with bone mets, SVT (on amio), anemia (s/p transfusion), decrease PO intake/weightloss presenting to ED with increased weakness and inability to use walker with assistance. admitted for failure to thrive and falls with subdural hematoma.

## 2021-05-25 NOTE — PROGRESS NOTE ADULT - PROBLEM SELECTOR PLAN 7
-Malnutrition severe chronic, etiology related to poor appetite, prostate ca with mets to bone, per nutrition  -Diet: dysphagia 1 Pureed-Honey Consistency Fluid, continue ensure TID supplements  -Aspiration precautions  -SW consult. Family requesting long term car facility with hospice unit at this time, Per daughter, Dr. More Parra.

## 2021-05-25 NOTE — PROGRESS NOTE ADULT - SUBJECTIVE AND OBJECTIVE BOX
Neurology follow up note    RUANGSAK [id number]      Interval History:    Patient feels ok no new complaints.    MEDICATIONS    aMIOdarone    Tablet 200 milliGRAM(s) Oral daily  polyethylene glycol 3350 17 Gram(s) Oral daily PRN  sodium chloride 0.9%. 1000 milliLiter(s) IV Continuous <Continuous>      Allergies    No Known Allergies    Intolerances            Vital Signs Last 24 Hrs  T(C): 36.4 (25 May 2021 04:57), Max: 36.4 (25 May 2021 04:57)  T(F): 97.6 (25 May 2021 04:57), Max: 97.6 (25 May 2021 04:57)  HR: 86 (25 May 2021 04:57) (67 - 86)  BP: 152/72 (25 May 2021 04:57) (132/61 - 152/72)  BP(mean): --  RR: 17 (25 May 2021 04:57) (17 - 17)  SpO2: 97% (25 May 2021 04:57) (96% - 97%)    REVIEW OF SYSTEMS:  Slightly limited secondary to the patient does appear to be forgetful at times, but constitutionally denies fever, chills, or night sweats.  Head:  No headaches.  Eyes:  No double vision or blurry vision.  Ears:  No ringing in the ears.  Neck:  No neck pain.  Cardiovascular:  No chest pain.  Respiratory:  No shortness of breath.  Abdomen:  No nausea, vomiting, or abdominal pain.  Extremities/Neurological:  No numbness or tingling.  Musculoskeletal:  Occasional joint pain.    PHYSICAL EXAMINATION:   HEENT:  Head:  Normocephalic and atraumatic.  Eyes:  No scleral icterus.  Ears:  Hearing bilaterally appeared to be intact.  NECK:  Supple.  CARDIOVASCULAR:  S1 and S2 heard.  RESPIRATORY:  Decreased breath sounds bilaterally.  ABDOMEN:  Soft, nontender.  EXTREMITIES:  No clubbing or cyanosis were noted.      NEUROLOGIC:  The patient was awake and alert.  Location with choice was hospital, year and month were unknown, but was able to repeat his birth date, was able to name simple objects.  Extraocular movements were intact.  Speech was fluent.  Smile symmetric.  Motor, bilateral upper 4/5, bilateral lower 3-/5.  The patient does appear to have slightly increased tone in bilateral lower extremities.  Sensory:  Bilateral intact to light touch.              LABS:  CBC Full  -  ( 24 May 2021 08:28 )  WBC Count : 4.33 K/uL  RBC Count : 2.74 M/uL  Hemoglobin : 7.7 g/dL  Hematocrit : 24.6 %  Platelet Count - Automated : 253 K/uL  Mean Cell Volume : 89.8 fl  Mean Cell Hemoglobin : 28.1 pg  Mean Cell Hemoglobin Concentration : 31.3 gm/dL  Auto Neutrophil # : 3.33 K/uL  Auto Lymphocyte # : 0.52 K/uL  Auto Monocyte # : 0.48 K/uL  Auto Eosinophil # : 0.00 K/uL  Auto Basophil # : 0.00 K/uL  Auto Neutrophil % : 77.0 %  Auto Lymphocyte % : 12.0 %  Auto Monocyte % : 11.0 %  Auto Eosinophil % : 0.0 %  Auto Basophil % : 0.0 %    Urinalysis Basic - ( 23 May 2021 13:15 )    Color: Yellow / Appearance: Clear / S.015 / pH: x  Gluc: x / Ketone: Moderate  / Bili: Negative / Urobili: 4   Blood: x / Protein: 30 mg/dL / Nitrite: Negative   Leuk Esterase: Trace / RBC: 0-2 /HPF / WBC 0-2   Sq Epi: x / Non Sq Epi: Occasional / Bacteria: Occasional      05-24    134<L>  |  102  |  11  ----------------------------<  121<H>  3.6   |  23  |  0.25<L>    Ca    7.2<L>      24 May 2021 08:28    TPro  5.8<L>  /  Alb  2.0<L>  /  TBili  0.6  /  DBili  x   /  AST  43<H>  /  ALT  36  /  AlkPhos  544<H>  05-24    Hemoglobin A1C:     LIVER FUNCTIONS - ( 24 May 2021 08:28 )  Alb: 2.0 g/dL / Pro: 5.8 g/dL / ALK PHOS: 544 U/L / ALT: 36 U/L / AST: 43 U/L / GGT: x           Vitamin B12   PT/INR - ( 23 May 2021 13:02 )   PT: 15.3 sec;   INR: 1.33 ratio         PTT - ( 23 May 2021 13:02 )  PTT:30.0 sec      RADIOLOGY      ANALYSIS AND PLAN:  An 81-year-old with a history of fall being found on the ground and subdural hematomas.  For episode of subdural hematomas, we would recommend no blood thinning medication.  If possible please maintain a systolic blood pressure of less than 140.  For episode of being found on the ground, questionable the patient had syncopal event versus mechanical fall with head trauma.    For possibly mild cognitive impairment versus subtle dementia. For now, we would recommend supportive therapy.  Marci at [phone] 2021  For prostate cancer with metastatic spread appears to be advanced, we would recommend supportive therapy.  chart reviewed all tests canceled by medical team for comfort evaluation      Greater than 45 minutes of time was spent with the patient, plan of care, reviewing data, with greater than 50% of the visit was spent counseling and/or coordinating care with multidisciplinary healthcare team

## 2021-05-25 NOTE — PROGRESS NOTE ADULT - PROBLEM SELECTOR PLAN 1
- Unwitnessed fall at home, no LOC  - CT head/neck: Areas of bilateral subdural hemorrhage are present, acute on the right, and acute on chronic on the left. There is no associated midline shift. Widespread bony metastatic disease. No evidence for acute fracture. Widespread bony metastatic disease. If symptoms persist, consider cervical spine MRI imaging follow-up.   - No pharmacologic VTE ppx  - Fall precautions  - Neuro consulted, recs appreciated  - COMFORT CARE - no labs, no vitals, no mews, no RRTs   - Further intervention pending GOC, apprec palliative collaboration-family wants to transition to LTC with hospice

## 2021-05-25 NOTE — PROGRESS NOTE ADULT - PROBLEM SELECTOR PLAN 2
-S/P recent fall  -Malnutrition severe chronic, etiology related to poor appetite, prostate ca with mets to bone, per nutrition  -Diet: Dysphagia 1 Pureed-Honey Consistency Fluid, continue ensure supplements  -Nutrition consulted, recs appreciated   -PT consult

## 2021-05-25 NOTE — PROGRESS NOTE ADULT - PROBLEM SELECTOR PLAN 3
Likely acute given hx of recent decrease PO intake, may have underlying component of SIADH given recent fall/trauma and endogenous cortisol release/stress  - Likely hypovolemic hyponatremia  - Na was improving, no longer performing labs

## 2021-05-25 NOTE — PROGRESS NOTE ADULT - PROBLEM SELECTOR PLAN 4
- Likely anemia of chronic disease 2/2 metastatic disease  - Vitals stable, no signs or symptoms of bleeding

## 2021-05-25 NOTE — PROGRESS NOTE ADULT - SUBJECTIVE AND OBJECTIVE BOX
Patient is a 81y old  Male who presents with a chief complaint of Fall (24 May 2021 12:46)      INTERVAL HPI/OVERNIGHT EVENTS: Pt seen and examined at bedside. No overnight events or new complaints. Pt is comfortable.     MEDICATIONS  (STANDING):  aMIOdarone    Tablet 200 milliGRAM(s) Oral daily  sodium chloride 0.9%. 1000 milliLiter(s) (60 mL/Hr) IV Continuous <Continuous>    MEDICATIONS  (PRN):  polyethylene glycol 3350 17 Gram(s) Oral daily PRN Constipation      Allergies    No Known Allergies    Intolerances        REVIEW OF SYSTEMS:  CONSTITUTIONAL: No fever or chills  HEENT:  No headache, no sore throat  RESPIRATORY: No cough, wheezing, or shortness of breath  CARDIOVASCULAR: No chest pain, palpitations  GASTROINTESTINAL: No abd pain, nausea, vomiting, or diarrhea  GENITOURINARY: No dysuria, frequency, or hematuria  NEUROLOGICAL: no focal weakness or dizziness  MUSCULOSKELETAL: no myalgias     Vital Signs Last 24 Hrs  T(C): 36.4 (25 May 2021 04:57), Max: 36.4 (25 May 2021 04:57)  T(F): 97.6 (25 May 2021 04:57), Max: 97.6 (25 May 2021 04:57)  HR: 86 (25 May 2021 04:57) (67 - 86)  BP: 152/72 (25 May 2021 04:57) (132/61 - 152/72)  BP(mean): --  RR: 17 (25 May 2021 04:57) (17 - 17)  SpO2: 97% (25 May 2021 04:57) (96% - 97%)    PHYSICAL EXAM:  GENERAL: Cachetic, NAD, appears weak, alert, interactive  HEENT:  anicteric, moist mucous membranes  CHEST/LUNG:  CTA b/l, decreased breath sounds b/l, no rales, wheezes, or rhonchi  HEART:  RRR, S1, S2, no murmurs  ABDOMEN:  BS+, soft, nontender, nondistended  EXTREMITIES: no edema, cyanosis, or calf tenderness  NEURO: wake, alert, oriented x2 to person and grossly to place (baseline), Motor bilateral upper 4/5, bilateral lower 3-/5  SKIN: warm, dry    LABS:    CBC Full  -  ( 24 May 2021 08:28 )  WBC Count : 4.33 K/uL  Hemoglobin : 7.7 g/dL  Hematocrit : 24.6 %  Platelet Count - Automated : 253 K/uL  Mean Cell Volume : 89.8 fl  Mean Cell Hemoglobin : 28.1 pg  Mean Cell Hemoglobin Concentration : 31.3 gm/dL  Auto Neutrophil # : 3.33 K/uL  Auto Lymphocyte # : 0.52 K/uL  Auto Monocyte # : 0.48 K/uL  Auto Eosinophil # : 0.00 K/uL  Auto Basophil # : 0.00 K/uL  Auto Neutrophil % : 77.0 %  Auto Lymphocyte % : 12.0 %  Auto Monocyte % : 11.0 %  Auto Eosinophil % : 0.0 %  Auto Basophil % : 0.0 %      Ca    7.2        24 May 2021 08:28      PT/INR - ( 23 May 2021 13:02 )   PT: 15.3 sec;   INR: 1.33 ratio         PTT - ( 23 May 2021 13:02 )  PTT:30.0 sec  Urinalysis Basic - ( 23 May 2021 13:15 )    Color: Yellow / Appearance: Clear / S.015 / pH: x  Gluc: x / Ketone: Moderate  / Bili: Negative / Urobili: 4   Blood: x / Protein: 30 mg/dL / Nitrite: Negative   Leuk Esterase: Trace / RBC: 0-2 /HPF / WBC 0-2   Sq Epi: x / Non Sq Epi: Occasional / Bacteria: Occasional      CAPILLARY BLOOD GLUCOSE            Culture - Urine (collected 21 @ 17:28)  Source: .Urine Clean Catch (Midstream)  Final Report (21 @ 12:19):    <10,000 CFU/mL Normal Urogenital Estephanie    Culture - Blood (collected 21 @ 16:15)  Source: .Blood Blood-Peripheral  Preliminary Report (21 @ 17:01):    No growth to date.    Culture - Blood (collected 21 @ 16:15)  Source: .Blood Blood-Peripheral  Preliminary Report (21 @ 17:01):    No growth to date.        RADIOLOGY & ADDITIONAL TESTS:    Personally reviewed.     Consultant(s) Notes Reviewed:  [x] YES  [ ] NO

## 2021-05-26 PROCEDURE — 99232 SBSQ HOSP IP/OBS MODERATE 35: CPT | Mod: GC

## 2021-05-26 RX ADMIN — AMIODARONE HYDROCHLORIDE 200 MILLIGRAM(S): 400 TABLET ORAL at 05:56

## 2021-05-26 NOTE — PROGRESS NOTE ADULT - PROBLEM SELECTOR PLAN 3
Likely acute given hx of recent decrease PO intake, may have underlying component of SIADH given recent fall/trauma and endogenous cortisol release/stress  - Likely hypovolemic hyponatremia  - Na was improving, no longer performing labs Likely acute given hx of recent decreased PO intake, may have underlying component of SIADH given recent fall/trauma and endogenous cortisol release/stress  - Likely hypovolemic hyponatremia  - Na was improving, no longer performing labs

## 2021-05-26 NOTE — PROGRESS NOTE ADULT - PROBLEM SELECTOR PLAN 1
- Unwitnessed fall at home, no LOC  - CT head/neck: Areas of bilateral subdural hemorrhage are present, acute on the right, and acute on chronic on the left. There is no associated midline shift. Widespread bony metastatic disease. No evidence for acute fracture. Widespread bony metastatic disease. If symptoms persist, consider cervical spine MRI imaging follow-up.   - No pharmacologic VTE ppx  - Fall precautions  - Neuro consulted, recs appreciated  - COMFORT CARE - no labs, no vitals, no mews, no RRTs   - Palliative collaboration-family wants to transition hospice. Dc pending hospice referral - Unwitnessed fall at home, no LOC  - CT head/neck: Areas of bilateral subdural hemorrhage are present, acute on the right, and acute on chronic on the left. There is no associated midline shift. Widespread bony metastatic disease. No evidence for acute fracture. Widespread bony metastatic disease. If symptoms persist, consider cervical spine MRI imaging follow-up.   - No pharmacologic VTE ppx  - Fall precautions  - Neuro consulted, recs appreciated  - COMFORT CARE - no labs, no vitals, no mews, no RRTs   - Palliative collaboration-pt/family wants to transition to hospice. Possibly to go to [place] if accepted.

## 2021-05-26 NOTE — PROGRESS NOTE ADULT - PROBLEM SELECTOR PLAN 2
-S/P recent fall  -Malnutrition severe chronic, etiology related to poor appetite, prostate ca with mets to bone, per nutrition  -Diet: Dysphagia 1 Pureed-Honey Consistency Fluid, continue ensure supplements  -Nutrition consulted, recs appreciated   -PT consult -S/P recent fall  -severe protein-calorie malnutrition chronic, due to poor appetite from malignancy, per nutrition  -Diet: Dysphagia 1 Pureed-Honey Consistency Fluid, continue ensure supplements  -Nutrition consulted, recs appreciated   -PT consult

## 2021-05-26 NOTE — PROGRESS NOTE ADULT - PROBLEM SELECTOR PLAN 5
-Patient with hx of stage 4 prostatic cancer with METS  -MOLST in Chart, DNR/DNI -Patient with hx of stage 4 prostatic cancer with METS  -MOLST in Chart, DNR/DNI  -comfort measures only, no further treatment / intervention for malignancy per pt/family wishes

## 2021-05-26 NOTE — PROGRESS NOTE ADULT - SUBJECTIVE AND OBJECTIVE BOX
Patient is a 81y old  Male who presents with a chief complaint of Fall (26 May 2021 08:10)      INTERVAL HPI/OVERNIGHT EVENTS: Pt seen and examined at bedside. No overnight events or new complaints. Pt is comfortable.    MEDICATIONS  (STANDING):  aMIOdarone    Tablet 200 milliGRAM(s) Oral daily  sodium chloride 0.9%. 1000 milliLiter(s) (60 mL/Hr) IV Continuous <Continuous>    MEDICATIONS  (PRN):  polyethylene glycol 3350 17 Gram(s) Oral daily PRN Constipation      Allergies    No Known Allergies    Intolerances        REVIEW OF SYSTEMS:  CONSTITUTIONAL: No fever or chills  HEENT:  No headache, no sore throat  RESPIRATORY: No cough, wheezing, or shortness of breath  CARDIOVASCULAR: No chest pain, palpitations  GASTROINTESTINAL: No abd pain, nausea, vomiting, or diarrhea  GENITOURINARY: No dysuria, frequency, or hematuria  NEUROLOGICAL: no focal weakness or dizziness  MUSCULOSKELETAL: no myalgias     Vital Signs Last 24 Hrs  T(C): 36.9 (26 May 2021 04:59), Max: 36.9 (26 May 2021 04:59)  T(F): 98.4 (26 May 2021 04:59), Max: 98.4 (26 May 2021 04:59)  HR: 69 (26 May 2021 04:59) (69 - 74)  BP: 130/61 (26 May 2021 04:59) (130/61 - 130/68)  BP(mean): --  RR: 17 (26 May 2021 04:59) (17 - 18)  SpO2: 98% (26 May 2021 04:59) (98% - 100%)    PHYSICAL EXAM:  GENERAL: Cachetic, NAD, appears weak, alert, interactive  HEENT:  anicteric, moist mucous membranes  CHEST/LUNG:  CTA b/l, decreased breath sounds b/l, no rales, wheezes, or rhonchi  HEART:  RRR, S1, S2, no murmurs  ABDOMEN:  BS+, soft, nontender, nondistended  EXTREMITIES: no edema, cyanosis, or calf tenderness  NEURO: wake, alert, oriented x2 to person and grossly to place (baseline). Motor weakness bilateral upper 4/5, bilateral lower 3-/5  SKIN: warm, dry    LABS:                CAPILLARY BLOOD GLUCOSE            Culture - Urine (collected 05-23-21 @ 17:28)  Source: .Urine Clean Catch (Midstream)  Final Report (05-24-21 @ 12:19):    <10,000 CFU/mL Normal Urogenital Estephanie    Culture - Blood (collected 05-23-21 @ 16:15)  Source: .Blood Blood-Peripheral  Preliminary Report (05-24-21 @ 17:01):    No growth to date.    Culture - Blood (collected 05-23-21 @ 16:15)  Source: .Blood Blood-Peripheral  Preliminary Report (05-24-21 @ 17:01):    No growth to date.        RADIOLOGY & ADDITIONAL TESTS:    Personally reviewed.     Consultant(s) Notes Reviewed:  [x] YES  [ ] NO     Patient is a 81y old  Male who presents with a chief complaint of falls, poor appetite.      INTERVAL HPI/OVERNIGHT EVENTS: Pt seen and examined at bedside. No acute overnight events or new complaints. Pt is comfortable.    MEDICATIONS  (STANDING):  aMIOdarone    Tablet 200 milliGRAM(s) Oral daily  sodium chloride 0.9%. 1000 milliLiter(s) (60 mL/Hr) IV Continuous <Continuous>    MEDICATIONS  (PRN):  polyethylene glycol 3350 17 Gram(s) Oral daily PRN Constipation      Allergies    No Known Allergies    Intolerances        REVIEW OF SYSTEMS:  CONSTITUTIONAL: No fever or chills  HEENT:  No headache, no sore throat  RESPIRATORY: No cough, wheezing, or shortness of breath  CARDIOVASCULAR: No chest pain, palpitations  GASTROINTESTINAL: No abd pain, nausea, vomiting, or diarrhea  GENITOURINARY: No dysuria, frequency, or hematuria  NEUROLOGICAL: no focal weakness or dizziness  MUSCULOSKELETAL: no myalgias     Vital Signs Last 24 Hrs  T(C): 36.9 (26 May 2021 04:59), Max: 36.9 (26 May 2021 04:59)  T(F): 98.4 (26 May 2021 04:59), Max: 98.4 (26 May 2021 04:59)  HR: 69 (26 May 2021 04:59) (69 - 74)  BP: 130/61 (26 May 2021 04:59) (130/61 - 130/68)  BP(mean): --  RR: 17 (26 May 2021 04:59) (17 - 18)  SpO2: 98% (26 May 2021 04:59) (98% - 100%)    PHYSICAL EXAM:  GENERAL: Cachectic, NAD, appears weak, frail  HEENT:  anicteric, moist mucous membranes  CHEST/LUNG:  decreased breath sounds b/l, no rales, wheezes  HEART:  RRR, S1, S2   ABDOMEN:  BS+, soft, nontender, nondistended  EXTREMITIES: no edema, cyanosis, or calf tenderness  NEURO: answering simple questions and following simple commands appropriately    LABS:    no labs, pt on comfort measures          Culture - Urine (collected 05-23-21 @ 17:28)  Source: .Urine Clean Catch (Midstream)  Final Report (05-24-21 @ 12:19):    <10,000 CFU/mL Normal Urogenital Estephanie    Culture - Blood (collected 05-23-21 @ 16:15)  Source: .Blood Blood-Peripheral  Preliminary Report (05-24-21 @ 17:01):    No growth to date.    Culture - Blood (collected 05-23-21 @ 16:15)  Source: .Blood Blood-Peripheral  Preliminary Report (05-24-21 @ 17:01):    No growth to date.        RADIOLOGY & ADDITIONAL TESTS:    Personally reviewed.     Consultant(s) Notes Reviewed:  [x] YES  [ ] NO

## 2021-05-26 NOTE — PROGRESS NOTE ADULT - SUBJECTIVE AND OBJECTIVE BOX
Neurology follow up note    RUANGSAK [id number]      Interval History:    Patient feels ok no new complaints.    MEDICATIONS    aMIOdarone    Tablet 200 milliGRAM(s) Oral daily  polyethylene glycol 3350 17 Gram(s) Oral daily PRN  sodium chloride 0.9%. 1000 milliLiter(s) IV Continuous <Continuous>      Allergies    No Known Allergies    Intolerances            Vital Signs Last 24 Hrs  T(C): 36.9 (26 May 2021 04:59), Max: 36.9 (26 May 2021 04:59)  T(F): 98.4 (26 May 2021 04:59), Max: 98.4 (26 May 2021 04:59)  HR: 69 (26 May 2021 04:59) (69 - 74)  BP: 130/61 (26 May 2021 04:59) (130/61 - 130/68)  BP(mean): --  RR: 17 (26 May 2021 04:59) (17 - 18)  SpO2: 98% (26 May 2021 04:59) (98% - 100%)         REVIEW OF SYSTEMS:  Slightly limited secondary to the patient does appear to be forgetful at times, but constitutionally denies fever, chills, or night sweats.  Head:  No headaches.  Eyes:  No double vision or blurry vision.  Ears:  No ringing in the ears.  Neck:  No neck pain.  Cardiovascular:  No chest pain.  Respiratory:  No shortness of breath.  Abdomen:  No nausea, vomiting, or abdominal pain.  Extremities/Neurological:  No numbness or tingling.  Musculoskeletal:  Occasional joint pain.    PHYSICAL EXAMINATION:   HEENT:  Head:  Normocephalic and atraumatic.  Eyes:  No scleral icterus.  Ears:  Hearing bilaterally appeared to be intact.  NECK:  Supple.  CARDIOVASCULAR:  S1 and S2 heard.  RESPIRATORY:  Decreased breath sounds bilaterally.  ABDOMEN:  Soft, nontender.  EXTREMITIES:  No clubbing or cyanosis were noted.      NEUROLOGIC:  The patient was awake and alert.  Location with choice was hospital, year and month were unknown, but was able to repeat his birth date, was able to name simple objects.  Extraocular movements were intact.  Speech was fluent.  Smile symmetric.  Motor, bilateral upper 4/5, bilateral lower 3-/5.  The patient does appear to have slightly increased tone in bilateral lower extremities.  Sensory:  Bilateral intact to light touch.         LABS:            Hemoglobin A1C:         RADIOLOGY    ANALYSIS AND PLAN:  An 81-year-old with a history of fall being found on the ground and subdural hematomas.  For episode of subdural hematomas, we would recommend no blood thinning medication.  If possible please maintain a systolic blood pressure of less than 140.  For episode of being found on the ground, questionable the patient had syncopal event versus mechanical fall with head trauma.    For possibly mild cognitive impairment versus subtle dementia. For now, we would recommend supportive therapy.  Marci at [phone] 5/24/2021  For prostate cancer with metastatic spread appears to be advanced, we would recommend supportive therapy.  chart reviewed all tests canceled by medical team for comfort evaluation      Greater than 25 minutes of time was spent with the patient, plan of care, reviewing data, with greater than 50% of the visit was spent counseling and/or coordinating care with multidisciplinary healthcare team

## 2021-05-26 NOTE — PROGRESS NOTE ADULT - PROBLEM SELECTOR PLAN 7
-Malnutrition severe chronic, etiology related to poor appetite, prostate ca with mets to bone, per nutrition  -Diet: dysphagia 1 Pureed-Honey Consistency Fluid, continue ensure TID supplements  -Aspiration precautions  -SW consult. Family requesting long term car facility with hospice unit at this time, Per daughter, Dr. More Parra. -severe protein-calorie malnutrition chronic, due to poor appetite from malignancy, per nutrition  -Diet: dysphagia 1 Pureed-Honey Consistency Fluid, continue ensure TID supplements  -Aspiration precautions  - consult. Family requesting long term care facility with hospice unit at this time, Per daughter, Dr. More Parra.  -Possibly to go to [place] if accepted.

## 2021-05-26 NOTE — PROGRESS NOTE ADULT - ASSESSMENT
81 M PMH prostate ca with bone mets, SVT (on amio), anemia (s/p transfusion), decrease PO intake/weightloss presenting to ED with increased weakness and inability to use walker with assistance. admitted for failure to thrive and falls with subdural hematoma. 80yo M PMH prostate ca with bone mets, SVT (on amio), anemia (s/p transfusion), decreased PO intake/weight loss presenting to ED with increased weakness and inability to use walker with assistance, falls, poor appetite admitted for failure to thrive and falls with subdural hematoma.

## 2021-05-27 PROCEDURE — 99232 SBSQ HOSP IP/OBS MODERATE 35: CPT | Mod: GC

## 2021-05-27 RX ADMIN — AMIODARONE HYDROCHLORIDE 200 MILLIGRAM(S): 400 TABLET ORAL at 06:05

## 2021-05-27 NOTE — PROGRESS NOTE ADULT - SUBJECTIVE AND OBJECTIVE BOX
Neurology follow up note    RUANGSAK [id number]      Interval History:    Patient feels ok no new complaints.    MEDICATIONS    aMIOdarone    Tablet 200 milliGRAM(s) Oral daily  polyethylene glycol 3350 17 Gram(s) Oral daily PRN  sodium chloride 0.9%. 1000 milliLiter(s) IV Continuous <Continuous>      Allergies    No Known Allergies    Intolerances            Vital Signs Last 24 Hrs  T(C): 36.4 (27 May 2021 04:55), Max: 36.4 (27 May 2021 04:55)  T(F): 97.6 (27 May 2021 04:55), Max: 97.6 (27 May 2021 04:55)  HR: 74 (27 May 2021 04:55) (74 - 74)  BP: 152/75 (27 May 2021 04:55) (152/75 - 152/75)  BP(mean): --  RR: 17 (27 May 2021 04:55) (17 - 17)  SpO2: 99% (27 May 2021 04:55) (99% - 99%)    REVIEW OF SYSTEMS:  Slightly limited secondary to the patient does appear to be forgetful at times, but constitutionally denies fever, chills, or night sweats.  Head:  No headaches.  Eyes:  No double vision or blurry vision.  Ears:  No ringing in the ears.  Neck:  No neck pain.  Cardiovascular:  No chest pain.  Respiratory:  No shortness of breath.  Abdomen:  No nausea, vomiting, or abdominal pain.  Extremities/Neurological:  No numbness or tingling.  Musculoskeletal:  Occasional joint pain.    PHYSICAL EXAMINATION:   HEENT:  Head:  Normocephalic and atraumatic.  Eyes:  No scleral icterus.  Ears:  Hearing bilaterally appeared to be intact.  NECK:  Supple.  CARDIOVASCULAR:  S1 and S2 heard.  RESPIRATORY:  Decreased breath sounds bilaterally.  ABDOMEN:  Soft, nontender.  EXTREMITIES:  No clubbing or cyanosis were noted.      NEUROLOGIC:  The patient was awake and alert.  Location with choice was hospital, year and month were unknown, but was able to repeat his birth date, was able to name simple objects.  Extraocular movements were intact.  Speech was fluent.  Smile symmetric.  Motor, bilateral upper 4/5, bilateral lower 3-/5.  The patient does appear to have slightly increased tone in bilateral lower extremities.  Sensory:  Bilateral intact to light touch.                   LABS:            Hemoglobin A1C:       Vitamin B12         RADIOLOGY      ANALYSIS AND PLAN:  An 81-year-old with a history of fall being found on the ground and subdural hematomas.  For episode of subdural hematomas, we would recommend no blood thinning medication.  If possible please maintain a systolic blood pressure of less than 140.  For episode of being found on the ground, questionable the patient had syncopal event versus mechanical fall with head trauma.    For possibly mild cognitive impairment versus subtle dementia. For now, we would recommend supportive therapy.  Marci at [phone] 5/24/2021  For prostate cancer with metastatic spread appears to be advanced, we would recommend supportive therapy.  chart reviewed all tests canceled by medical team for comfort evaluation    dc planning as per medical team     Greater than 25 minutes of time was spent with the patient, plan of care, reviewing data, with greater than 50% of the visit was spent counseling and/or coordinating care with multidisciplinary healthcare team

## 2021-05-27 NOTE — PROGRESS NOTE ADULT - PROBLEM SELECTOR PLAN 1
- Unwitnessed fall at home, no LOC  - CT head/neck: Areas of bilateral subdural hemorrhage are present, acute on the right, and acute on chronic on the left. There is no associated midline shift. Widespread bony metastatic disease. No evidence for acute fracture. Widespread bony metastatic disease. If symptoms persist, consider cervical spine MRI imaging follow-up.   - No pharmacologic VTE ppx  - Fall precautions  - Neuro consulted, recs appreciated  - COMFORT CARE - no labs, no vitals, no mews, no RRTs   - Palliative collaboration-pt/family wants to transition to hospice. Possibly to go to [place] if accepted.

## 2021-05-27 NOTE — PROGRESS NOTE ADULT - PROBLEM SELECTOR PLAN 5
-Patient with hx of stage 4 prostatic cancer with METS  -MOLST in Chart, DNR/DNI  -comfort measures only, no further treatment / intervention for malignancy per pt/family wishes

## 2021-05-27 NOTE — PROGRESS NOTE ADULT - SUBJECTIVE AND OBJECTIVE BOX
Patient is a 81y old  Male who presents with a chief complaint of Fall (27 May 2021 09:01)      INTERVAL HPI/OVERNIGHT EVENTS: Pt seen and examined at bedside. No acute overnight events or new complaints. Pt is comfortable.    MEDICATIONS  (STANDING):  aMIOdarone    Tablet 200 milliGRAM(s) Oral daily  sodium chloride 0.9%. 1000 milliLiter(s) (60 mL/Hr) IV Continuous <Continuous>    MEDICATIONS  (PRN):  polyethylene glycol 3350 17 Gram(s) Oral daily PRN Constipation      Allergies    No Known Allergies    Intolerances        REVIEW OF SYSTEMS:  CONSTITUTIONAL: No fever or chills  HEENT:  No headache, no sore throat  RESPIRATORY: No cough, wheezing, or shortness of breath  CARDIOVASCULAR: No chest pain, palpitations  GASTROINTESTINAL: No abd pain, nausea, vomiting, or diarrhea  GENITOURINARY: No dysuria, frequency, or hematuria  NEUROLOGICAL: no focal weakness or dizziness  MUSCULOSKELETAL: no myalgias     Vital Signs Last 24 Hrs  T(C): 36.4 (27 May 2021 04:55), Max: 36.4 (27 May 2021 04:55)  T(F): 97.6 (27 May 2021 04:55), Max: 97.6 (27 May 2021 04:55)  HR: 74 (27 May 2021 04:55) (74 - 74)  BP: 152/75 (27 May 2021 04:55) (152/75 - 152/75)  BP(mean): --  RR: 17 (27 May 2021 04:55) (17 - 17)  SpO2: 99% (27 May 2021 04:55) (99% - 99%)    PHYSICAL EXAM:  GENERAL: Cachectic, NAD, appears weak, frail  HEENT:  anicteric, moist mucous membranes  CHEST/LUNG:  decreased breath sounds b/l, no rales, wheezes  HEART:  RRR, S1, S2   ABDOMEN:  BS+, soft, nontender, nondistended  EXTREMITIES: no edema, cyanosis, or calf tenderness  NEURO: answering simple questions and following simple commands appropriately    LABS:                CAPILLARY BLOOD GLUCOSE            Culture - Urine (collected 05-23-21 @ 17:28)  Source: .Urine Clean Catch (Midstream)  Final Report (05-24-21 @ 12:19):    <10,000 CFU/mL Normal Urogenital Estephanie    Culture - Blood (collected 05-23-21 @ 16:15)  Source: .Blood Blood-Peripheral  Preliminary Report (05-24-21 @ 17:01):    No growth to date.    Culture - Blood (collected 05-23-21 @ 16:15)  Source: .Blood Blood-Peripheral  Preliminary Report (05-24-21 @ 17:01):    No growth to date.        RADIOLOGY & ADDITIONAL TESTS:    Personally reviewed.     Consultant(s) Notes Reviewed:  [x] YES  [ ] NO     Patient is a 81y old  Male who presents with a chief complaint of falls, poor appetite.      INTERVAL HPI/OVERNIGHT EVENTS: Pt seen and examined at bedside. No acute overnight events or new complaints. Pt is comfortable.    MEDICATIONS  (STANDING):  aMIOdarone    Tablet 200 milliGRAM(s) Oral daily  sodium chloride 0.9%. 1000 milliLiter(s) (60 mL/Hr) IV Continuous <Continuous>    MEDICATIONS  (PRN):  polyethylene glycol 3350 17 Gram(s) Oral daily PRN Constipation      Allergies    No Known Allergies    Intolerances        REVIEW OF SYSTEMS:  CONSTITUTIONAL: admits he has no appetite; No fever or chills  HEENT:  No headache, no sore throat  RESPIRATORY: No cough, wheezing, or shortness of breath  CARDIOVASCULAR: No chest pain, palpitations  GASTROINTESTINAL: No abd pain, nausea, vomiting, or diarrhea  GENITOURINARY: No dysuria, frequency, or hematuria  NEUROLOGICAL: no focal weakness or dizziness  MUSCULOSKELETAL: no myalgias     Vital Signs Last 24 Hrs  T(C): 36.4 (27 May 2021 04:55), Max: 36.4 (27 May 2021 04:55)  T(F): 97.6 (27 May 2021 04:55), Max: 97.6 (27 May 2021 04:55)  HR: 74 (27 May 2021 04:55) (74 - 74)  BP: 152/75 (27 May 2021 04:55) (152/75 - 152/75)  BP(mean): --  RR: 17 (27 May 2021 04:55) (17 - 17)  SpO2: 99% (27 May 2021 04:55) (99% - 99%)    PHYSICAL EXAM:  GENERAL: Cachectic, not in acute distress, appears weak, frail  HEENT:  anicteric, moist mucous membranes  CHEST/LUNG:  decreased breath sounds b/l, no rales, wheezes  HEART:  RRR, S1, S2   ABDOMEN:  BS+, soft, nontender, nondistended  EXTREMITIES: no edema, cyanosis, or calf tenderness  NEURO: answering simple questions and following simple commands appropriately    LABS:    no labs      Culture - Urine (collected 05-23-21 @ 17:28)  Source: .Urine Clean Catch (Midstream)  Final Report (05-24-21 @ 12:19):    <10,000 CFU/mL Normal Urogenital Estephanie    Culture - Blood (collected 05-23-21 @ 16:15)  Source: .Blood Blood-Peripheral  Preliminary Report (05-24-21 @ 17:01):    No growth to date.    Culture - Blood (collected 05-23-21 @ 16:15)  Source: .Blood Blood-Peripheral  Preliminary Report (05-24-21 @ 17:01):    No growth to date.        RADIOLOGY & ADDITIONAL TESTS:    Personally reviewed.     Consultant(s) Notes Reviewed:  [x] YES  [ ] NO

## 2021-05-27 NOTE — PROGRESS NOTE ADULT - PROBLEM SELECTOR PLAN 7
-severe protein-calorie malnutrition chronic, due to poor appetite from malignancy, per nutrition  -Diet: dysphagia 1 Pureed-Honey Consistency Fluid, continue ensure TID supplements  -Aspiration precautions  - consult. Family requesting long term care facility with hospice unit at this time, Per daughter, Dr. More Parra.  -Possibly to go to [place] if accepted.

## 2021-05-27 NOTE — CHART NOTE - NSCHARTNOTEFT_GEN_A_CORE
Assessment: 80 y/o male adm with weakness/generalized. PMH anemia, prostate Ca with mets to bone, type 2 DM. Pt confused, EMR reviewed. Spoke with RN. Pt with poor appetite. SLP john 5/24 rx dysphagia 1 puree with NT liquids.  Pt on CMO/ no artificial nutrition as per MOLST. Pt's family would like Hospice (consult pending).     Factors impacting intake: [ ] none [ ] nausea  [ ] vomiting [ ] diarrhea [ ] constipation  [ ]chewing problems [ ] swallowing issues  [x ] other: poor appetite (advanced disease)     Diet Presciption: Diet, Dysphagia 1 Pureed-Nectar Consistency Fluid (05-25-21 @ 06:57)    Intake: poor     Current Weight: adm 101.6# 5/26 96.5#   % Weight Change    Pertinent Medications: MEDICATIONS  (STANDING):  aMIOdarone    Tablet 200 milliGRAM(s) Oral daily  sodium chloride 0.9%. 1000 milliLiter(s) (60 mL/Hr) IV Continuous <Continuous>    MEDICATIONS  (PRN):  polyethylene glycol 3350 17 Gram(s) Oral daily PRN Constipation    Pertinent Labs: 05-24 Na134 mmol/L<L> Glu 121 mg/dL<H> K+ 3.6 mmol/L Cr  0.25 mg/dL<L> BUN 11 mg/dL 05-24 Alb 2.0 g/dL<L>     CAPILLARY BLOOD GLUCOSE        Skin: no pressure ulcers noted    Estimated Needs:   [x ] no change since previous assessment  [ ] recalculated:     Previous Nutrition Diagnosis:   [ ] Inadequate Energy Intake [ ]Inadequate Oral Intake [ ] Excessive Energy Intake   [ ] Underweight [ ] Increased Nutrient Needs [ ] Overweight/Obesity   [ ] Altered GI Function [ ] Unintended Weight Loss [ ] Food & Nutrition Related Knowledge Deficit [x ] Malnutrition     Nutrition Diagnosis is [ x] ongoing (on CMOs)  [ ] resolved [ ] not applicable     New Nutrition Diagnosis: [ x] not applicable       Interventions:   Recommend  [ ] Change Diet To:  [ ] Nutrition Supplement  [ ] Nutrition Support  [x ] Other: continue current diet order; honor pt's food preferences and tolerances    Monitoring and Evaluation:   [x ] PO intake [ x ] Tolerance to diet prescription [ x ] weights [ x ] labs[ x ] follow up per protocol  [ ] other:
81 M PMH prostate ca with bone mets, SVT (on amio), anemia (s/p transfusion), decrease PO intake/weightloss presenting to ED with increased weakness and inability to use walker with assistance. admitted for failure to thrive and falls with subdural hematoma.    - RN Called Informing that patient and family refusing Urinary catheter.  - Will D/C urinary catheter order.  - Primary medical   - RN to call with any new change.

## 2021-05-27 NOTE — PROGRESS NOTE ADULT - PROBLEM SELECTOR PLAN 3
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

## 2021-05-27 NOTE — PROGRESS NOTE ADULT - ASSESSMENT
80yo M PMH prostate ca with bone mets, SVT (on amio), anemia (s/p transfusion), decreased PO intake/weight loss presenting to ED with increased weakness and inability to use walker with assistance, falls, poor appetite admitted for failure to thrive and falls with subdural hematoma. 82yo M w/ PMH of prostate ca with bone mets, SVT (on amio), anemia (s/p transfusion), decreased PO intake/weight loss presenting to ED with increased weakness and inability to use walker with assistance, falls, poor appetite admitted for failure to thrive and falls with subdural hematoma.

## 2021-05-27 NOTE — PROGRESS NOTE ADULT - PROBLEM SELECTOR PLAN 2
-S/P recent fall  -severe protein-calorie malnutrition chronic, due to poor appetite from malignancy, per nutrition  -Diet: Dysphagia 1 Pureed-Honey Consistency Fluid, continue ensure supplements  -Nutrition consulted, recs appreciated   -PT consult

## 2021-05-28 VITALS
DIASTOLIC BLOOD PRESSURE: 57 MMHG | HEART RATE: 79 BPM | TEMPERATURE: 98 F | SYSTOLIC BLOOD PRESSURE: 105 MMHG | OXYGEN SATURATION: 100 % | RESPIRATION RATE: 17 BRPM

## 2021-05-28 PROCEDURE — 99232 SBSQ HOSP IP/OBS MODERATE 35: CPT | Mod: GC

## 2021-05-28 RX ADMIN — AMIODARONE HYDROCHLORIDE 200 MILLIGRAM(S): 400 TABLET ORAL at 05:16

## 2021-05-28 NOTE — PROGRESS NOTE ADULT - ASSESSMENT
80yo M w/ PMH of prostate ca with bone mets, SVT (on amio), anemia (s/p transfusion), decreased PO intake/weight loss presenting to ED with increased weakness and inability to use walker with assistance, falls, poor appetite admitted for failure to thrive and falls with subdural hematoma. 82yo M w/ PMH of prostate ca with bone mets, SVT (on amio), anemia (s/p transfusion), decreased PO intake/weight loss presenting to ED with increased weakness and inability to use walker with assistance, falls, poor appetite admitted for failure to thrive and falls with subdural hematoma, awaiting review of application to [facility] for hospice. 82yo M w/ PMH of prostate ca with bone mets, SVT (on amio), anemia (s/p transfusion), decreased PO intake/weight loss presenting to ED with increased weakness and inability to use walker with assistance, falls, poor appetite admitted for failure to thrive and falls with subdural hematoma, awaiting bed availability at [facility] for hospice.

## 2021-05-28 NOTE — PROGRESS NOTE ADULT - PROBLEM SELECTOR PLAN 7
-severe protein-calorie malnutrition chronic, due to poor appetite from malignancy, per nutrition  -Diet: dysphagia 1 Pureed-Honey Consistency Fluid, continue ensure TID supplements  -Aspiration precautions  - consult. Family requesting long term care facility with hospice unit at this time, Per daughter, Dr. More Parra.  -Possibly to go to [facility] if accepted. -severe protein-calorie malnutrition chronic, due to poor appetite from malignancy, per nutrition  -Diet: dysphagia 1 Pureed-Honey Consistency Fluid, continue ensure TID supplements  -Aspiration precautions  -SW consult. Family requesting long term care facility with hospice unit at this time, Per daughter, Dr. More Parra. Patient accepted to [facility], awaiting bed availability.  -Possibly to go to [facility] if accepted. -severe protein-calorie malnutrition chronic, due to poor appetite from malignancy, per nutrition  -Diet: dysphagia 1 Pureed-Honey Consistency Fluid, continue ensure TID supplements  -Aspiration precautions  -SW consult. Family requesting long term care facility with hospice unit at this time, Per daughter, Dr. More Dixondeo. Patient accepted to [facility], awaiting bed availability.  -Pt now accepted to [facility] -- awaiting bed availability.

## 2021-05-28 NOTE — PROGRESS NOTE ADULT - PROBLEM SELECTOR PLAN 1
- Unwitnessed fall at home, no LOC  - CT head/neck: Areas of bilateral subdural hemorrhage are present, acute on the right, and acute on chronic on the left. There is no associated midline shift. Widespread bony metastatic disease. No evidence for acute fracture. Widespread bony metastatic disease. If symptoms persist, consider cervical spine MRI imaging follow-up.   - No pharmacologic VTE ppx  - Fall precautions  - Neuro consulted, recs appreciated  - COMFORT CARE - no labs, no vitals, no mews, no RRTs   - Palliative collaboration-pt/family wants to transition to hospice. Possibly to go to [facility] if accepted. - Unwitnessed fall at home, no LOC  - CT head/neck: bilateral subdural hemorrhage are present, acute on the right, and acute on chronic on the left. There is no associated midline shift. Widespread bony metastatic disease.   - No pharmacologic VTE ppx  - Fall precautions  - Neuro consulted, recs appreciated  - COMFORT CARE - no labs, no vitals, no mews, no RRTs   - Palliative collaboration-pt/family wants to transition to hospice. Possibly to go to [place] if accepted. - Unwitnessed fall at home, no LOC  - CT head/neck: bilateral subdural hemorrhage are present, acute on the right, and acute on chronic on the left. There is no associated midline shift. Widespread bony metastatic disease.   - No pharmacologic VTE ppx  - Fall precautions  - Neuro consulted, recs appreciated  - COMFORT CARE - no labs, no vitals, no mews, no RRTs   - Palliative collaboration-pt/family wants to transition to hospice. Pt now accepted to [facility] -- awaiting bed availability.

## 2021-05-28 NOTE — PROGRESS NOTE ADULT - TIME BILLING
81 M PMH prostate ca with bone mets, SVT (on amio), anemia (s/p transfusion), decrease PO intake/weightloss presenting to ED with increased weakness and inability to use walker with assistance. admitted for failure to thrive and falls with subdural hematoma.       : Fall at home.  Plan: - Unwitnessed fall at home, no LOC  - CT head/neck: Areas of bilateral subdural hemorrhage are present, acute on the right, and acute on chronic on the left. There is no associated midline shift. Widespread bony metastatic disease. No evidence for acute fracture. Widespread bony metastatic disease. If symptoms persist, consider cervical spine MRI imaging follow-up.   - No pharmacologic VTE ppx  - Fall precautions  - Neuro consulted, recs appreciated  - COMFORT CARE - no labs, no vitals, no mews, no RRTs   - Further intervention pending GOC, apprec palliative collaboration-family wants to transition to LTC with hospice.
Pt seen + examined. Case discussed with resident. Agree with assessment and plan above (edited by me in detail):  Time spent: 27min. More than 50% of the visit was spent counseling the patient on medical condition -- FTT, subdural hematoma, metastatic prostate ca, hospice.  80yo M w/ PMH of prostate ca with bone mets, SVT (on amio), anemia (s/p transfusion), decreased PO intake/weight loss presenting to ED with increased weakness and inability to use walker with assistance, falls, poor appetite admitted for failure to thrive and falls with subdural hematoma.  - Unwitnessed fall at home, no LOC  - CT head/neck: Areas of bilateral subdural hemorrhage are present, acute on the right, and acute on chronic on the left. There is no associated midline shift. Widespread bony metastatic disease. No evidence for acute fracture. Widespread bony metastatic disease. If symptoms persist, consider cervical spine MRI imaging follow-up.   - No pharmacologic VTE ppx  - Fall precautions  - Neuro consulted, recs appreciated  - pt with FTT, has no appetite, eating very small amounts of food  - COMFORT CARE - no labs, no vitals, no mews, no RRTs   - Palliative collaboration-pt/family wants to transition to hospice. Possibly to go to [facility] if accepted -- application sent and awaiting decision.  - SW working on placement
Pt seen + examined. Case discussed with resident. Agree with assessment and plan above (edited by me in detail):  Time spent: 30min. More than 50% of the visit was spent counseling the patient on medical condition -- FTT, subdural hematoma, metastatic prostate ca, hospice.  82yo M PMH prostate ca with bone mets, SVT (on amio), anemia (s/p transfusion), decreased PO intake/weight loss presenting to ED with increased weakness and inability to use walker with assistance, falls, poor appetite admitted for failure to thrive and falls with subdural hematoma.  - Unwitnessed fall at home, no LOC  - CT head/neck: Areas of bilateral subdural hemorrhage are present, acute on the right, and acute on chronic on the left. There is no associated midline shift. Widespread bony metastatic disease. No evidence for acute fracture. Widespread bony metastatic disease. If symptoms persist, consider cervical spine MRI imaging follow-up.   - No pharmacologic VTE ppx  - Fall precautions  - Neuro consulted, recs appreciated  - COMFORT CARE - no labs, no vitals, no mews, no RRTs   - Palliative collaboration-pt/family wants to transition to hospice. Possibly to go to [place] if accepted.  - SW working on placement
Pt seen + examined. Case discussed with resident. Agree with assessment and plan above (edited by me in detail):  Time spent: 27min. More than 50% of the visit was spent counseling the patient on medical condition -- FTT, subdural hematoma, metastatic prostate ca, hospice.  82yo M w/ PMH of prostate ca with bone mets, SVT (on amio), anemia (s/p transfusion), decreased PO intake/weight loss presenting to ED with increased weakness and inability to use walker with assistance, falls, poor appetite admitted for failure to thrive and falls with subdural hematoma.  - Unwitnessed fall at home, no LOC  - CT head/neck: Areas of bilateral subdural hemorrhage are present, acute on the right, and acute on chronic on the left. There is no associated midline shift. Widespread bony metastatic disease. No evidence for acute fracture. Widespread bony metastatic disease. If symptoms persist, consider cervical spine MRI imaging follow-up.   - Neuro consulted, recs appreciated  - pt with FTT, has no appetite, eating very small amounts of food due to malignancy  - pt and his family decided to pursue COMFORT CARE - no labs, no vitals, no mews, no RRTs   - Palliative collaboration-pt/family wants to transition to hospice. Pt now accepted to [facility] -- awaiting bed availability.   - SW working on placement
81 M PMH prostate ca with bone mets, SVT (on amio), anemia (s/p transfusion), decrease PO intake/weightloss presenting to ED with increased weakness and inability to use walker with assistance. admitted for failure to thrive and falls with subdural hematoma.     Fall at home.  Plan: - Unwitnessed fall at home, no LOC  - CT head/neck: Areas of bilateral subdural hemorrhage are present, acute on the right, and acute on chronic on the left. There is no associated midline shift. Widespread bony metastatic disease. No evidence for acute fracture. Widespread bony metastatic disease. If symptoms persist, consider cervical spine MRI imaging follow-up.   - Neuro consulted, recs appreciated  - COMFORT CARE - no labs, no vitals, no mews, no RRTs   - Further intervention pending GOC, apprec palliative collaboration-family wants to transition to LTC with hospice.    Weakness.  Plan: -S/P recent fall  -Malnutrition severe chronic, etiology related to poor appetite, prostate ca with mets to bone, per nutrition  -Diet: Dysphagia 1 Pureed-Honey Consistency Fluid, continue ensure supplements  -Nutrition consulted, recs appreciated   -PT consult.

## 2021-05-28 NOTE — PROGRESS NOTE ADULT - SUBJECTIVE AND OBJECTIVE BOX
*****CHARTING IN PROGRESS*****       Patient is a 81y old  Male who presents with a chief complaint of Fall (28 May 2021 11:04)      INTERVAL HPI/OVERNIGHT EVENTS: Patient seen and examined at bedside. No overnight events occurred. Patient has no complaints at this time. Denies fevers, chills, headache, lightheadedness, chest pain, dyspnea, abdominal pain, n/v/d/c.    MEDICATIONS  (STANDING):  aMIOdarone    Tablet 200 milliGRAM(s) Oral daily  sodium chloride 0.9%. 1000 milliLiter(s) (60 mL/Hr) IV Continuous <Continuous>    MEDICATIONS  (PRN):  polyethylene glycol 3350 17 Gram(s) Oral daily PRN Constipation      Allergies    No Known Allergies    Intolerances        REVIEW OF SYSTEMS:  CONSTITUTIONAL: No fever or chills  HEENT:  No headache, no sore throat  RESPIRATORY: No cough, wheezing, or shortness of breath  CARDIOVASCULAR: No chest pain, palpitations  GASTROINTESTINAL: No abd pain, nausea, vomiting, or diarrhea  GENITOURINARY: No dysuria, frequency, or hematuria  NEUROLOGICAL: no focal weakness or dizziness  MUSCULOSKELETAL: no myalgias     Vital Signs Last 24 Hrs  T(C): --  T(F): --  HR: --  BP: --  BP(mean): --  RR: --  SpO2: --    PHYSICAL EXAM:  GENERAL: NAD  HEENT:  anicteric, moist mucous membranes  CHEST/LUNG:  CTA b/l, no rales, wheezes, or rhonchi  HEART:  RRR, S1, S2  ABDOMEN:  BS+, soft, nontender, nondistended  EXTREMITIES: no edema, cyanosis, or calf tenderness  NERVOUS SYSTEM: answers questions and follows commands appropriately    LABS:                CAPILLARY BLOOD GLUCOSE            Culture - Urine (collected 05-23-21 @ 17:28)  Source: .Urine Clean Catch (Midstream)  Final Report (05-24-21 @ 12:19):    <10,000 CFU/mL Normal Urogenital Estephanie    Culture - Blood (collected 05-23-21 @ 16:15)  Source: .Blood Blood-Peripheral  Preliminary Report (05-24-21 @ 17:01):    No growth to date.    Culture - Blood (collected 05-23-21 @ 16:15)  Source: .Blood Blood-Peripheral  Preliminary Report (05-24-21 @ 17:01):    No growth to date.        RADIOLOGY & ADDITIONAL TESTS:    Personally reviewed.     Consultant(s) Notes Reviewed:  [x] YES  [ ] NO     Patient is a 81y old  Male who presents with a chief complaint of Fall (28 May 2021 11:04)      INTERVAL HPI/OVERNIGHT EVENTS: Patient seen and examined at bedside. No overnight events occurred. Patient has no complaints at this time, only requests some assistance opening yogurt and pouring water.    MEDICATIONS  (STANDING):  aMIOdarone    Tablet 200 milliGRAM(s) Oral daily  sodium chloride 0.9%. 1000 milliLiter(s) (60 mL/Hr) IV Continuous <Continuous>    MEDICATIONS  (PRN):  polyethylene glycol 3350 17 Gram(s) Oral daily PRN Constipation      Allergies    No Known Allergies    Intolerances        REVIEW OF SYSTEMS:  CONSTITUTIONAL: No fever or chills  HEENT:  No headache, no sore throat  RESPIRATORY: No cough, wheezing, or shortness of breath  CARDIOVASCULAR: No chest pain, palpitations  GASTROINTESTINAL: No abd pain, nausea, vomiting, or diarrhea  GENITOURINARY: No dysuria, frequency, or hematuria  NEUROLOGICAL: no focal weakness or dizziness  MUSCULOSKELETAL: no myalgias     Vital Signs Last 24 Hrs  T(C): --  T(F): --  HR: --  BP: --  BP(mean): --  RR: --  SpO2: --    PHYSICAL EXAM:  GENERAL: Cachectic, not in acute distress, appears weak, frail  HEENT:  anicteric, moist mucous membranes  CHEST/LUNG:  decreased breath sounds b/l, no rales, wheezes  HEART:  RRR, S1, S2   ABDOMEN:  BS+, soft, nontender, nondistended  EXTREMITIES: no edema, cyanosis, or calf tenderness  NEURO: answering simple questions and following simple commands appropriately        Culture - Urine (collected 05-23-21 @ 17:28)  Source: .Urine Clean Catch (Midstream)  Final Report (05-24-21 @ 12:19):    <10,000 CFU/mL Normal Urogenital Estephanie    Culture - Blood (collected 05-23-21 @ 16:15)  Source: .Blood Blood-Peripheral  Preliminary Report (05-24-21 @ 17:01):    No growth to date.    Culture - Blood (collected 05-23-21 @ 16:15)  Source: .Blood Blood-Peripheral  Preliminary Report (05-24-21 @ 17:01):    No growth to date.        RADIOLOGY & ADDITIONAL TESTS: No additional imaging.    Personally reviewed.     Consultant(s) Notes Reviewed:  [x] YES  [ ] NO     Patient is a 81y old  Male who presents with a chief complaint of falls, poor appetite.      INTERVAL HPI/OVERNIGHT EVENTS: Patient seen and examined at bedside. No acute overnight events occurred. Patient has no complaints at this time, only requests some assistance opening yogurt and pouring water.    MEDICATIONS  (STANDING):  aMIOdarone    Tablet 200 milliGRAM(s) Oral daily  sodium chloride 0.9%. 1000 milliLiter(s) (60 mL/Hr) IV Continuous <Continuous>    MEDICATIONS  (PRN):  polyethylene glycol 3350 17 Gram(s) Oral daily PRN Constipation      Allergies    No Known Allergies    Intolerances        REVIEW OF SYSTEMS:  CONSTITUTIONAL: admits he has no appetite; No fever or chills  HEENT:  No headache, no sore throat  RESPIRATORY: No cough, wheezing, or shortness of breath  CARDIOVASCULAR: No chest pain, palpitations  GASTROINTESTINAL: No abd pain, nausea, vomiting, or diarrhea  GENITOURINARY: No dysuria, frequency, or hematuria  NEUROLOGICAL: no focal weakness or dizziness  MUSCULOSKELETAL: no myalgias    Vital Signs Last 24 Hrs  T(C): --  T(F): --  HR: --  BP: --  BP(mean): --  RR: --  SpO2: --    PHYSICAL EXAM:  GENERAL: Cachectic, not in acute distress, appears weak, frail  HEENT:  anicteric, moist mucous membranes  CHEST/LUNG:  decreased breath sounds b/l, no rales, wheezes  HEART:  RRR, S1, S2   ABDOMEN:  BS+, soft, nontender, nondistended  EXTREMITIES: no edema, cyanosis, or calf tenderness  NEURO: answering simple questions and following simple commands appropriately        Culture - Urine (collected 05-23-21 @ 17:28)  Source: .Urine Clean Catch (Midstream)  Final Report (05-24-21 @ 12:19):    <10,000 CFU/mL Normal Urogenital Estephanie    Culture - Blood (collected 05-23-21 @ 16:15)  Source: .Blood Blood-Peripheral  Preliminary Report (05-24-21 @ 17:01):    No growth to date.    Culture - Blood (collected 05-23-21 @ 16:15)  Source: .Blood Blood-Peripheral  Preliminary Report (05-24-21 @ 17:01):    No growth to date.        RADIOLOGY & ADDITIONAL TESTS: No additional imaging.    Personally reviewed.     Consultant(s) Notes Reviewed:  [x] YES  [ ] NO

## 2021-05-28 NOTE — PROGRESS NOTE ADULT - SUBJECTIVE AND OBJECTIVE BOX
Neurology follow up note    RUANGSAK [id number]      Interval History:    Patient feels ok no new complaints no headaches     MEDICATIONS    aMIOdarone    Tablet 200 milliGRAM(s) Oral daily  polyethylene glycol 3350 17 Gram(s) Oral daily PRN  sodium chloride 0.9%. 1000 milliLiter(s) IV Continuous <Continuous>      Allergies    No Known Allergies    Intolerances            Vital Signs Last 24 Hrs  T(C): --  T(F): --  HR: --  BP: --  BP(mean): --  RR: --  SpO2: --        REVIEW OF SYSTEMS:  Slightly limited secondary to the patient does appear to be forgetful at times, but constitutionally denies fever, chills, or night sweats.  Head:  No headaches.  Eyes:  No double vision or blurry vision.  Ears:  No ringing in the ears.  Neck:  No neck pain.  Cardiovascular:  No chest pain.  Respiratory:  No shortness of breath.  Abdomen:  No nausea, vomiting, or abdominal pain.  Extremities/Neurological:  No numbness or tingling.  Musculoskeletal:  Occasional joint pain.    PHYSICAL EXAMINATION:   HEENT:  Head:  Normocephalic and atraumatic.  Eyes:  No scleral icterus.  Ears:  Hearing bilaterally appeared to be intact.  NECK:  Supple.  CARDIOVASCULAR:  S1 and S2 heard.  RESPIRATORY:  Decreased breath sounds bilaterally.  ABDOMEN:  Soft, nontender.  EXTREMITIES:  No clubbing or cyanosis were noted.      NEUROLOGIC:  The patient was awake and alert.  Location with choice was hospital, year and month were unknown, but was able to repeat his birth date, was able to name simple objects.  Extraocular movements were intact.  Speech was fluent.  Smile symmetric.  Motor, bilateral upper 4/5, bilateral lower 3-/5.              LABS:            Hemoglobin A1C:       Vitamin B12         RADIOLOGY      ANALYSIS AND PLAN:  An 81-year-old with a history of fall being found on the ground and subdural hematomas.  For episode of subdural hematomas, we would recommend no blood thinning medication.  If possible please maintain a systolic blood pressure of less than 140.  For episode of being found on the ground, questionable the patient had syncopal event versus mechanical fall with head trauma.    For possibly mild cognitive impairment versus subtle dementia. For now, we would recommend supportive therapy.  Marci at [phone] 5/24/2021  For prostate cancer with metastatic spread appears to be advanced, we would recommend supportive therapy.  chart reviewed all tests canceled by medical team for comfort evaluation    dc planning as per medical team   will sign off     Greater than 25 minutes of time was spent with the patient, plan of care, reviewing data, with greater than 50% of the visit was spent counseling and/or coordinating care with multidisciplinary healthcare team

## 2021-05-29 ENCOUNTER — TRANSCRIPTION ENCOUNTER (OUTPATIENT)
Age: 81
End: 2021-05-29

## 2021-05-29 PROCEDURE — 86850 RBC ANTIBODY SCREEN: CPT

## 2021-05-29 PROCEDURE — 70450 CT HEAD/BRAIN W/O DYE: CPT

## 2021-05-29 PROCEDURE — 83935 ASSAY OF URINE OSMOLALITY: CPT

## 2021-05-29 PROCEDURE — 80048 BASIC METABOLIC PNL TOTAL CA: CPT

## 2021-05-29 PROCEDURE — 84484 ASSAY OF TROPONIN QUANT: CPT

## 2021-05-29 PROCEDURE — 86901 BLOOD TYPING SEROLOGIC RH(D): CPT

## 2021-05-29 PROCEDURE — 86900 BLOOD TYPING SEROLOGIC ABO: CPT

## 2021-05-29 PROCEDURE — 84300 ASSAY OF URINE SODIUM: CPT

## 2021-05-29 PROCEDURE — 87086 URINE CULTURE/COLONY COUNT: CPT

## 2021-05-29 PROCEDURE — 82570 ASSAY OF URINE CREATININE: CPT

## 2021-05-29 PROCEDURE — 99232 SBSQ HOSP IP/OBS MODERATE 35: CPT | Mod: GC

## 2021-05-29 PROCEDURE — 83930 ASSAY OF BLOOD OSMOLALITY: CPT

## 2021-05-29 PROCEDURE — 85610 PROTHROMBIN TIME: CPT

## 2021-05-29 PROCEDURE — 71045 X-RAY EXAM CHEST 1 VIEW: CPT

## 2021-05-29 PROCEDURE — 86769 SARS-COV-2 COVID-19 ANTIBODY: CPT

## 2021-05-29 PROCEDURE — 80053 COMPREHEN METABOLIC PANEL: CPT

## 2021-05-29 PROCEDURE — 97162 PT EVAL MOD COMPLEX 30 MIN: CPT

## 2021-05-29 PROCEDURE — 92610 EVALUATE SWALLOWING FUNCTION: CPT

## 2021-05-29 PROCEDURE — 83036 HEMOGLOBIN GLYCOSYLATED A1C: CPT

## 2021-05-29 PROCEDURE — 81001 URINALYSIS AUTO W/SCOPE: CPT

## 2021-05-29 PROCEDURE — 93005 ELECTROCARDIOGRAM TRACING: CPT

## 2021-05-29 PROCEDURE — 85730 THROMBOPLASTIN TIME PARTIAL: CPT

## 2021-05-29 PROCEDURE — 99285 EMERGENCY DEPT VISIT HI MDM: CPT

## 2021-05-29 PROCEDURE — 85025 COMPLETE CBC W/AUTO DIFF WBC: CPT

## 2021-05-29 PROCEDURE — 36415 COLL VENOUS BLD VENIPUNCTURE: CPT

## 2021-05-29 PROCEDURE — 0225U NFCT DS DNA&RNA 21 SARSCOV2: CPT

## 2021-05-29 PROCEDURE — 96360 HYDRATION IV INFUSION INIT: CPT

## 2021-05-29 PROCEDURE — 87040 BLOOD CULTURE FOR BACTERIA: CPT

## 2021-05-29 PROCEDURE — 96361 HYDRATE IV INFUSION ADD-ON: CPT

## 2021-05-29 PROCEDURE — 82962 GLUCOSE BLOOD TEST: CPT

## 2021-05-29 PROCEDURE — 72125 CT NECK SPINE W/O DYE: CPT

## 2021-05-29 PROCEDURE — 83605 ASSAY OF LACTIC ACID: CPT

## 2021-05-29 RX ORDER — DOCUSATE SODIUM 100 MG
1 CAPSULE ORAL
Qty: 0 | Refills: 0 | DISCHARGE

## 2021-05-29 RX ORDER — METFORMIN HYDROCHLORIDE 850 MG/1
1 TABLET ORAL
Qty: 0 | Refills: 0 | DISCHARGE

## 2021-05-29 RX ORDER — VALACYCLOVIR 500 MG/1
1 TABLET, FILM COATED ORAL
Qty: 0 | Refills: 0 | DISCHARGE

## 2021-05-29 RX ORDER — PREGABALIN 225 MG/1
0 CAPSULE ORAL
Qty: 0 | Refills: 0 | DISCHARGE

## 2021-05-29 RX ORDER — AMIODARONE HYDROCHLORIDE 400 MG/1
1 TABLET ORAL
Qty: 0 | Refills: 0 | DISCHARGE

## 2021-05-29 RX ADMIN — AMIODARONE HYDROCHLORIDE 200 MILLIGRAM(S): 400 TABLET ORAL at 06:32

## 2021-05-29 NOTE — PROGRESS NOTE ADULT - PROVIDER SPECIALTY LIST ADULT
Neurology
Hospitalist

## 2021-05-29 NOTE — PROGRESS NOTE ADULT - PROBLEM SELECTOR PROBLEM 6
SVT (supraventricular tachycardia)

## 2021-05-29 NOTE — PROGRESS NOTE ADULT - ASSESSMENT
82yo M w/ PMH of prostate ca with bone mets, SVT (on amio), anemia (s/p transfusion), decreased PO intake/weight loss presenting to ED with increased weakness and inability to use walker with assistance, falls, poor appetite admitted for failure to thrive and falls with subdural hematoma, awaiting bed availability at [facility] for hospice.

## 2021-05-29 NOTE — PROGRESS NOTE ADULT - PROBLEM SELECTOR PROBLEM 5
Prostatic cancer

## 2021-05-29 NOTE — DISCHARGE NOTE NURSING/CASE MANAGEMENT/SOCIAL WORK - PATIENT PORTAL LINK FT
You can access the FollowMyHealth Patient Portal offered by [facility] by registering at the following website: [email or web address]. By joining [facility]’s FollowMyHealth portal, you will also be able to view your health information using other applications (apps) compatible with our system.

## 2021-05-29 NOTE — PROGRESS NOTE ADULT - ATTENDING COMMENTS
80yo M w/ PMH of prostate ca with bone mets, SVT (on amio), anemia (s/p transfusion), decreased PO intake/weight loss presenting to ED with increased weakness and inability to use walker with assistance, falls, poor appetite admitted for failure to thrive and falls with subdural hematoma, awaiting bed availability at [facility] for hospice    Pt has been accepted by [facility], and as per case management bed available and pt has been transferred over there today.    Adán Medel, Attending Physician
see below

## 2021-05-29 NOTE — PROGRESS NOTE ADULT - NUTRITIONAL ASSESSMENT
This patient has been assessed with a concern for Malnutrition and has been determined to have a diagnosis/diagnoses of Severe protein-calorie malnutrition and Underweight (BMI < 19).    This patient is being managed with:   Diet Dysphagia 1 Pureed-Nectar Consistency Fluid-  Entered: May 25 2021  6:56AM    

## 2021-05-29 NOTE — PROGRESS NOTE ADULT - SUBJECTIVE AND OBJECTIVE BOX
Patient is a 81y old  Male who presents with a chief complaint of FTT, Falls with subdural hematomas (28 May 2021 11:13)      INTERVAL HPI/OVERNIGHT EVENTS: Pt seen and examined at bedside. No overnight events or new complaints. Wants to eat breakfast.     MEDICATIONS  (STANDING):  aMIOdarone    Tablet 200 milliGRAM(s) Oral daily  sodium chloride 0.9%. 1000 milliLiter(s) (60 mL/Hr) IV Continuous <Continuous>    MEDICATIONS  (PRN):  polyethylene glycol 3350 17 Gram(s) Oral daily PRN Constipation      Allergies    No Known Allergies    Intolerances        REVIEW OF SYSTEMS:  CONSTITUTIONAL: No fever or chills  HEENT:  No headache, no sore throat  RESPIRATORY: No cough, wheezing, or shortness of breath  CARDIOVASCULAR: No chest pain, palpitations  GASTROINTESTINAL: No abd pain, nausea, vomiting, or diarrhea  GENITOURINARY: No dysuria, frequency, or hematuria  NEUROLOGICAL: no focal weakness or dizziness  MUSCULOSKELETAL: no myalgias     Vital Signs Last 24 Hrs  T(C): 36.4 (28 May 2021 21:00), Max: 36.4 (28 May 2021 21:00)  T(F): 97.6 (28 May 2021 21:00), Max: 97.6 (28 May 2021 21:00)  HR: 79 (28 May 2021 21:00) (79 - 79)  BP: 105/57 (28 May 2021 21:00) (105/57 - 105/57)  BP(mean): --  RR: 17 (28 May 2021 21:00) (17 - 17)  SpO2: 100% (28 May 2021 21:00) (100% - 100%)    PHYSICAL EXAM:  GENERAL: Cachectic, not in acute distress, appears weak, frail  HEENT:  anicteric, moist mucous membranes  CHEST/LUNG:  decreased breath sounds b/l, no rales, wheezes  HEART:  RRR, S1, S2   ABDOMEN:  BS+, soft, nontender, nondistended  EXTREMITIES: no edema, cyanosis, or calf tenderness  NEURO: answering simple questions and following simple commands appropriately    LABS:                CAPILLARY BLOOD GLUCOSE            Culture - Urine (collected 05-23-21 @ 17:28)  Source: .Urine Clean Catch (Midstream)  Final Report (05-24-21 @ 12:19):    <10,000 CFU/mL Normal Urogenital Estephanie    Culture - Blood (collected 05-23-21 @ 16:15)  Source: .Blood Blood-Peripheral  Final Report (05-28-21 @ 17:00):    No Growth Final    Culture - Blood (collected 05-23-21 @ 16:15)  Source: .Blood Blood-Peripheral  Final Report (05-28-21 @ 17:00):    No Growth Final        RADIOLOGY & ADDITIONAL TESTS:    Personally reviewed.     Consultant(s) Notes Reviewed:  [x] YES  [ ] NO

## 2021-05-29 NOTE — PROGRESS NOTE ADULT - PROBLEM SELECTOR PLAN 7
-severe protein-calorie malnutrition chronic, due to poor appetite from malignancy, per nutrition  - Diet: dysphagia 1 Pureed-Honey Consistency Fluid, continue ensure TID supplements  - Aspiration precautions  - SW consult. Family requesting long term care facility with hospice unit at this time, Per daughter, Dr. More Parra  - Pt now accepted to [facility] -- awaiting bed availability

## 2021-05-29 NOTE — PROGRESS NOTE ADULT - PROBLEM SELECTOR PLAN 1
- Unwitnessed fall at home, no LOC  - CT head/neck: bilateral subdural hemorrhage are present, acute on the right, and acute on chronic on the left. There is no associated midline shift. Widespread bony metastatic disease.   - No pharmacologic VTE ppx  - Fall precautions  - Neuro consulted, recs appreciated  - COMFORT CARE - no labs, no vitals, no mews, no RRTs   - Palliative collaboration-pt/family wants to transition to hospice. Pt now accepted to [facility] -- awaiting bed availability

## 2021-06-01 ENCOUNTER — NON-APPOINTMENT (OUTPATIENT)
Age: 81
End: 2021-06-01

## 2022-01-29 NOTE — DISCHARGE NOTE PROVIDER - NSDCHC_MEDRECSTATUS_GEN_ALL_CORE
Sheath #1: Sheath: removed. Admission Reconciliation is Completed  Discharge Reconciliation is Not Complete Admission Reconciliation is Completed  Discharge Reconciliation is Completed

## 2022-11-10 NOTE — H&P ADULT - NSHPREVIEWOFSYSTEMS_GEN_ALL_CORE
To get better and follow your care plan as instructed. REVIEW OF SYSTEMS  CONSTITUTIONAL: No fevers. No chills  EYES/ENT: No visual changes. No discharge from eyes. No vertigo. No throat pain. No dysphagia.  NECK: No pain or stiffness or rigidity.  RESPIRATORY: No cough. No wheezing. No hemoptysis. No shortness of breath.  CARDIOVASCULAR: No chest pain. No palpitations.   GASTROINTESTINAL: No abdominal pain. No nausea. No vomiting. No hematemesis. No diarrhea. No constipation. No melena, No hematochezia.  GENITOURINARY: No dysuria. No hesitancy. No frequency. No hematuria.  NEUROLOGICAL: No numbness. No weakness. No change in speech. No fecal or urinary incontinence.   MSK: No joint swelling or erythema. No back pain.  SKIN: No itching or rashes.   PSYCH: Normal affect. Normal mood. No si. No hi.    Review of systems negative except for items noted above.

## 2023-08-14 NOTE — PATIENT PROFILE ADULT - NSPROEXTENSIONSOFSELF_GEN_A_NUR
oriented to person, place and time , normal sensation , short and long term memory intact eyeglasses

## 2023-09-12 NOTE — PROGRESS NOTE ADULT - ASSESSMENT
80 year old male with hx of metastatic prostate Ca and T2DM who was recently admitted for SVT presents with recurrent tachycardia with dropped beats, concern for Mobitz Type II vs Mobitz Type I vs non-conducted PACs, admitted for further management. Pt is taking Tretinoin from previous derm; states she’s seen improvement and would like to continue to take it. Recc patient to wash face 2x daily with gentle cleanser; add topical clindamycin BID and Tretinoin nightly. Detail Level: Zone

## 2025-03-02 NOTE — ED ADULT NURSE NOTE - ADDITIONAL LOCATION
as: PRILOSEC  TAKE ONE CAPSULE BY MOUTH EVERY DAY     sertraline 100 MG tablet  Commonly known as: ZOLOFT               Where to Get Your Medications        These medications were sent to [facility] - [place], [place] - [address] - P [phone] - F [phone] [address] [facility], [facility] [address]      Phone: [phone]   ibuprofen 600 MG tablet  oxyCODONE-acetaminophen 5-325 MG per tablet  predniSONE 20 MG tablet          Follow up:  [facility]  [address]  [facility] [address] [phone]           Disclaimers   I was the first provider for this patient on this visit.  To the best of my ability I reviewed relevant prior medical records, electrocardiograms, laboratories, and radiologic studies.    The patient's presenting problems were discussed, and the patient was in agreement with the care plan formulated and outlined with them.     Please note that this dictation was completed with Dragon voice recognition software. Quite often unanticipated grammatical, syntax, homophones, and other interpretive errors are inadvertently transcribed by the computer software.  Please disregard these errors and excuse any errors that have escaped final proofreading.    I personally performed the services described in this documentation on this date 3/2/25  for patient Blossom Stephens.      Landry Bird MD  10:24 AM            Landry Bird MD  03/02/25 2390    
additional location...
